# Patient Record
Sex: FEMALE | Race: ASIAN | NOT HISPANIC OR LATINO | URBAN - METROPOLITAN AREA
[De-identification: names, ages, dates, MRNs, and addresses within clinical notes are randomized per-mention and may not be internally consistent; named-entity substitution may affect disease eponyms.]

---

## 2021-03-23 ENCOUNTER — INPATIENT (INPATIENT)
Facility: HOSPITAL | Age: 51
LOS: 5 days | Discharge: ANOTHER HOSPITAL | End: 2021-03-29
Attending: INTERNAL MEDICINE | Admitting: INTERNAL MEDICINE
Payer: MEDICAID

## 2021-03-23 VITALS
OXYGEN SATURATION: 92 % | RESPIRATION RATE: 30 BRPM | SYSTOLIC BLOOD PRESSURE: 173 MMHG | HEART RATE: 132 BPM | TEMPERATURE: 98 F | DIASTOLIC BLOOD PRESSURE: 126 MMHG

## 2021-03-23 DIAGNOSIS — Z98.891 HISTORY OF UTERINE SCAR FROM PREVIOUS SURGERY: Chronic | ICD-10-CM

## 2021-03-23 DIAGNOSIS — I10 ESSENTIAL (PRIMARY) HYPERTENSION: ICD-10-CM

## 2021-03-23 DIAGNOSIS — I50.9 HEART FAILURE, UNSPECIFIED: ICD-10-CM

## 2021-03-23 DIAGNOSIS — E11.10 TYPE 2 DIABETES MELLITUS WITH KETOACIDOSIS WITHOUT COMA: ICD-10-CM

## 2021-03-23 DIAGNOSIS — I50.1 LEFT VENTRICULAR FAILURE, UNSPECIFIED: ICD-10-CM

## 2021-03-23 LAB
A1C WITH ESTIMATED AVERAGE GLUCOSE RESULT: 13.9 % — HIGH (ref 4–5.6)
ALBUMIN SERPL ELPH-MCNC: 4 G/DL — SIGNIFICANT CHANGE UP (ref 3.3–5)
ALP SERPL-CCNC: 104 U/L — SIGNIFICANT CHANGE UP (ref 40–120)
ALT FLD-CCNC: 25 U/L — SIGNIFICANT CHANGE UP (ref 4–33)
ANION GAP SERPL CALC-SCNC: 14 MMOL/L — SIGNIFICANT CHANGE UP (ref 7–14)
ANION GAP SERPL CALC-SCNC: 18 MMOL/L — HIGH (ref 7–14)
APPEARANCE UR: CLEAR — SIGNIFICANT CHANGE UP
APPEARANCE UR: CLEAR — SIGNIFICANT CHANGE UP
APTT BLD: 28.5 SEC — SIGNIFICANT CHANGE UP (ref 27–36.3)
AST SERPL-CCNC: 29 U/L — SIGNIFICANT CHANGE UP (ref 4–32)
B-OH-BUTYR SERPL-SCNC: 2 MMOL/L — HIGH (ref 0–0.4)
BASOPHILS # BLD AUTO: 0.03 K/UL — SIGNIFICANT CHANGE UP (ref 0–0.2)
BASOPHILS NFR BLD AUTO: 0.2 % — SIGNIFICANT CHANGE UP (ref 0–2)
BILIRUB SERPL-MCNC: 0.6 MG/DL — SIGNIFICANT CHANGE UP (ref 0.2–1.2)
BILIRUB UR-MCNC: NEGATIVE — SIGNIFICANT CHANGE UP
BILIRUB UR-MCNC: NEGATIVE — SIGNIFICANT CHANGE UP
BLOOD GAS VENOUS COMPREHENSIVE RESULT: SIGNIFICANT CHANGE UP
BLOOD GAS VENOUS COMPREHENSIVE RESULT: SIGNIFICANT CHANGE UP
BUN SERPL-MCNC: 17 MG/DL — SIGNIFICANT CHANGE UP (ref 7–23)
BUN SERPL-MCNC: 19 MG/DL — SIGNIFICANT CHANGE UP (ref 7–23)
CALCIUM SERPL-MCNC: 9.5 MG/DL — SIGNIFICANT CHANGE UP (ref 8.4–10.5)
CALCIUM SERPL-MCNC: 9.6 MG/DL — SIGNIFICANT CHANGE UP (ref 8.4–10.5)
CHLORIDE SERPL-SCNC: 92 MMOL/L — LOW (ref 98–107)
CHLORIDE SERPL-SCNC: 98 MMOL/L — SIGNIFICANT CHANGE UP (ref 98–107)
CO2 SERPL-SCNC: 17 MMOL/L — LOW (ref 22–31)
CO2 SERPL-SCNC: 23 MMOL/L — SIGNIFICANT CHANGE UP (ref 22–31)
COLOR SPEC: COLORLESS — SIGNIFICANT CHANGE UP
COLOR SPEC: SIGNIFICANT CHANGE UP
CREAT SERPL-MCNC: 0.52 MG/DL — SIGNIFICANT CHANGE UP (ref 0.5–1.3)
CREAT SERPL-MCNC: 0.56 MG/DL — SIGNIFICANT CHANGE UP (ref 0.5–1.3)
DIFF PNL FLD: NEGATIVE — SIGNIFICANT CHANGE UP
DIFF PNL FLD: NEGATIVE — SIGNIFICANT CHANGE UP
EOSINOPHIL # BLD AUTO: 0.01 K/UL — SIGNIFICANT CHANGE UP (ref 0–0.5)
EOSINOPHIL NFR BLD AUTO: 0.1 % — SIGNIFICANT CHANGE UP (ref 0–6)
ESTIMATED AVERAGE GLUCOSE: 352 MG/DL — HIGH (ref 68–114)
GLUCOSE BLDC GLUCOMTR-MCNC: 207 MG/DL — HIGH (ref 70–99)
GLUCOSE BLDC GLUCOMTR-MCNC: 229 MG/DL — HIGH (ref 70–99)
GLUCOSE BLDC GLUCOMTR-MCNC: 241 MG/DL — HIGH (ref 70–99)
GLUCOSE BLDC GLUCOMTR-MCNC: 278 MG/DL — HIGH (ref 70–99)
GLUCOSE BLDC GLUCOMTR-MCNC: 293 MG/DL — HIGH (ref 70–99)
GLUCOSE BLDC GLUCOMTR-MCNC: 307 MG/DL — HIGH (ref 70–99)
GLUCOSE BLDC GLUCOMTR-MCNC: 316 MG/DL — HIGH (ref 70–99)
GLUCOSE BLDC GLUCOMTR-MCNC: 392 MG/DL — HIGH (ref 70–99)
GLUCOSE BLDC GLUCOMTR-MCNC: 548 MG/DL — CRITICAL HIGH (ref 70–99)
GLUCOSE SERPL-MCNC: 312 MG/DL — HIGH (ref 70–99)
GLUCOSE SERPL-MCNC: 659 MG/DL — CRITICAL HIGH (ref 70–99)
GLUCOSE UR QL: ABNORMAL
GLUCOSE UR QL: ABNORMAL
HCT VFR BLD CALC: 43.6 % — SIGNIFICANT CHANGE UP (ref 34.5–45)
HGB BLD-MCNC: 13.9 G/DL — SIGNIFICANT CHANGE UP (ref 11.5–15.5)
IANC: 15.19 K/UL — HIGH (ref 1.5–8.5)
IMM GRANULOCYTES NFR BLD AUTO: 0.8 % — SIGNIFICANT CHANGE UP (ref 0–1.5)
INR BLD: 0.97 RATIO — SIGNIFICANT CHANGE UP (ref 0.88–1.16)
KETONES UR-MCNC: ABNORMAL
KETONES UR-MCNC: ABNORMAL
LEUKOCYTE ESTERASE UR-ACNC: NEGATIVE — SIGNIFICANT CHANGE UP
LEUKOCYTE ESTERASE UR-ACNC: NEGATIVE — SIGNIFICANT CHANGE UP
LYMPHOCYTES # BLD AUTO: 0.88 K/UL — LOW (ref 1–3.3)
LYMPHOCYTES # BLD AUTO: 5.3 % — LOW (ref 13–44)
MAGNESIUM SERPL-MCNC: 2 MG/DL — SIGNIFICANT CHANGE UP (ref 1.6–2.6)
MCHC RBC-ENTMCNC: 25.3 PG — LOW (ref 27–34)
MCHC RBC-ENTMCNC: 31.9 GM/DL — LOW (ref 32–36)
MCV RBC AUTO: 79.4 FL — LOW (ref 80–100)
MONOCYTES # BLD AUTO: 0.31 K/UL — SIGNIFICANT CHANGE UP (ref 0–0.9)
MONOCYTES NFR BLD AUTO: 1.9 % — LOW (ref 2–14)
NEUTROPHILS # BLD AUTO: 15.19 K/UL — HIGH (ref 1.8–7.4)
NEUTROPHILS NFR BLD AUTO: 91.7 % — HIGH (ref 43–77)
NITRITE UR-MCNC: NEGATIVE — SIGNIFICANT CHANGE UP
NITRITE UR-MCNC: NEGATIVE — SIGNIFICANT CHANGE UP
NRBC # BLD: 0 /100 WBCS — SIGNIFICANT CHANGE UP
NRBC # FLD: 0 K/UL — SIGNIFICANT CHANGE UP
NT-PROBNP SERPL-SCNC: 2107 PG/ML — HIGH
NT-PROBNP SERPL-SCNC: 2847 PG/ML — HIGH
PH UR: 5.5 — SIGNIFICANT CHANGE UP (ref 5–8)
PH UR: 6 — SIGNIFICANT CHANGE UP (ref 5–8)
PLATELET # BLD AUTO: 456 K/UL — HIGH (ref 150–400)
POTASSIUM SERPL-MCNC: 4.2 MMOL/L — SIGNIFICANT CHANGE UP (ref 3.5–5.3)
POTASSIUM SERPL-MCNC: 4.3 MMOL/L — SIGNIFICANT CHANGE UP (ref 3.5–5.3)
POTASSIUM SERPL-SCNC: 4.2 MMOL/L — SIGNIFICANT CHANGE UP (ref 3.5–5.3)
POTASSIUM SERPL-SCNC: 4.3 MMOL/L — SIGNIFICANT CHANGE UP (ref 3.5–5.3)
PROT SERPL-MCNC: 7.5 G/DL — SIGNIFICANT CHANGE UP (ref 6–8.3)
PROT UR-MCNC: ABNORMAL
PROT UR-MCNC: NEGATIVE — SIGNIFICANT CHANGE UP
PROTHROM AB SERPL-ACNC: 11.2 SEC — SIGNIFICANT CHANGE UP (ref 10.6–13.6)
RBC # BLD: 5.49 M/UL — HIGH (ref 3.8–5.2)
RBC # FLD: 13.4 % — SIGNIFICANT CHANGE UP (ref 10.3–14.5)
SARS-COV-2 RNA SPEC QL NAA+PROBE: SIGNIFICANT CHANGE UP
SODIUM SERPL-SCNC: 127 MMOL/L — LOW (ref 135–145)
SODIUM SERPL-SCNC: 135 MMOL/L — SIGNIFICANT CHANGE UP (ref 135–145)
SP GR SPEC: 1.01 — SIGNIFICANT CHANGE UP (ref 1.01–1.02)
SP GR SPEC: 1.03 — HIGH (ref 1.01–1.02)
TROPONIN T, HIGH SENSITIVITY RESULT: 111 NG/L — CRITICAL HIGH
TSH SERPL-MCNC: 1.77 UIU/ML — SIGNIFICANT CHANGE UP (ref 0.27–4.2)
UROBILINOGEN FLD QL: SIGNIFICANT CHANGE UP
UROBILINOGEN FLD QL: SIGNIFICANT CHANGE UP
WBC # BLD: 16.55 K/UL — HIGH (ref 3.8–10.5)
WBC # FLD AUTO: 16.55 K/UL — HIGH (ref 3.8–10.5)

## 2021-03-23 PROCEDURE — 71045 X-RAY EXAM CHEST 1 VIEW: CPT | Mod: 26

## 2021-03-23 PROCEDURE — 71045 X-RAY EXAM CHEST 1 VIEW: CPT | Mod: 26,77

## 2021-03-23 PROCEDURE — 93010 ELECTROCARDIOGRAM REPORT: CPT

## 2021-03-23 PROCEDURE — 99291 CRITICAL CARE FIRST HOUR: CPT | Mod: 25

## 2021-03-23 RX ORDER — NITROGLYCERIN 6.5 MG
50 CAPSULE, EXTENDED RELEASE ORAL
Qty: 50 | Refills: 0 | Status: DISCONTINUED | OUTPATIENT
Start: 2021-03-23 | End: 2021-03-23

## 2021-03-23 RX ORDER — MIDAZOLAM HYDROCHLORIDE 1 MG/ML
0.02 INJECTION, SOLUTION INTRAMUSCULAR; INTRAVENOUS
Qty: 100 | Refills: 0 | Status: DISCONTINUED | OUTPATIENT
Start: 2021-03-23 | End: 2021-03-23

## 2021-03-23 RX ORDER — FENTANYL CITRATE 50 UG/ML
0.5 INJECTION INTRAVENOUS
Qty: 5000 | Refills: 0 | Status: DISCONTINUED | OUTPATIENT
Start: 2021-03-23 | End: 2021-03-23

## 2021-03-23 RX ORDER — FUROSEMIDE 40 MG
40 TABLET ORAL ONCE
Refills: 0 | Status: COMPLETED | OUTPATIENT
Start: 2021-03-23 | End: 2021-03-23

## 2021-03-23 RX ORDER — DEXTROSE 50 % IN WATER 50 %
25 SYRINGE (ML) INTRAVENOUS
Refills: 0 | Status: DISCONTINUED | OUTPATIENT
Start: 2021-03-23 | End: 2021-03-23

## 2021-03-23 RX ORDER — NITROGLYCERIN 6.5 MG
15 CAPSULE, EXTENDED RELEASE ORAL
Qty: 50 | Refills: 0 | Status: DISCONTINUED | OUTPATIENT
Start: 2021-03-23 | End: 2021-03-23

## 2021-03-23 RX ORDER — SODIUM CHLORIDE 9 MG/ML
250 INJECTION INTRAMUSCULAR; INTRAVENOUS; SUBCUTANEOUS
Refills: 0 | Status: DISCONTINUED | OUTPATIENT
Start: 2021-03-23 | End: 2021-03-24

## 2021-03-23 RX ORDER — POTASSIUM CHLORIDE 20 MEQ
10 PACKET (EA) ORAL
Refills: 0 | Status: COMPLETED | OUTPATIENT
Start: 2021-03-23 | End: 2021-03-23

## 2021-03-23 RX ORDER — INSULIN HUMAN 100 [IU]/ML
6 INJECTION, SOLUTION SUBCUTANEOUS
Qty: 100 | Refills: 0 | Status: DISCONTINUED | OUTPATIENT
Start: 2021-03-23 | End: 2021-03-23

## 2021-03-23 RX ORDER — HEPARIN SODIUM 5000 [USP'U]/ML
5000 INJECTION INTRAVENOUS; SUBCUTANEOUS EVERY 12 HOURS
Refills: 0 | Status: DISCONTINUED | OUTPATIENT
Start: 2021-03-23 | End: 2021-03-24

## 2021-03-23 RX ORDER — INSULIN HUMAN 100 [IU]/ML
3 INJECTION, SOLUTION SUBCUTANEOUS
Qty: 100 | Refills: 0 | Status: DISCONTINUED | OUTPATIENT
Start: 2021-03-23 | End: 2021-03-24

## 2021-03-23 RX ORDER — SODIUM CHLORIDE 9 MG/ML
1000 INJECTION, SOLUTION INTRAVENOUS
Refills: 0 | Status: DISCONTINUED | OUTPATIENT
Start: 2021-03-23 | End: 2021-03-24

## 2021-03-23 RX ORDER — DEXTROSE 50 % IN WATER 50 %
50 SYRINGE (ML) INTRAVENOUS
Refills: 0 | Status: DISCONTINUED | OUTPATIENT
Start: 2021-03-23 | End: 2021-03-23

## 2021-03-23 RX ADMIN — Medication 15 MICROGRAM(S)/MIN: at 15:06

## 2021-03-23 RX ADMIN — SODIUM CHLORIDE 50 MILLILITER(S): 9 INJECTION INTRAMUSCULAR; INTRAVENOUS; SUBCUTANEOUS at 15:51

## 2021-03-23 RX ADMIN — INSULIN HUMAN 3 UNIT(S)/HR: 100 INJECTION, SOLUTION SUBCUTANEOUS at 18:05

## 2021-03-23 RX ADMIN — Medication 100 MILLIEQUIVALENT(S): at 15:07

## 2021-03-23 RX ADMIN — SODIUM CHLORIDE 50 MILLILITER(S): 9 INJECTION INTRAMUSCULAR; INTRAVENOUS; SUBCUTANEOUS at 20:18

## 2021-03-23 RX ADMIN — Medication 40 MILLIGRAM(S): at 14:28

## 2021-03-23 RX ADMIN — INSULIN HUMAN 6 UNIT(S)/HR: 100 INJECTION, SOLUTION SUBCUTANEOUS at 15:41

## 2021-03-23 RX ADMIN — Medication 100 MILLIEQUIVALENT(S): at 17:21

## 2021-03-23 RX ADMIN — Medication 100 MILLIEQUIVALENT(S): at 16:08

## 2021-03-23 RX ADMIN — INSULIN HUMAN 3 UNIT(S)/HR: 100 INJECTION, SOLUTION SUBCUTANEOUS at 20:14

## 2021-03-23 NOTE — ED PROVIDER NOTE - ATTENDING CONTRIBUTION TO CARE
agree with resident note    "51y/o F w/ h/o T2DM (on metformin) p/w chest pain for 1 wk a/w exertional sob. Tested negative for COVID 2d ago. No fevers, chills, n/v/d, abdominal pain, urinary symptoms."   Pt states had cough for a week and then progressively worsening SOB to today.  Denies fever, abdominal pain, leg swelling    PE: severe respiratory distress; tachypneic; hypertensive; rales on exam; s1 s2 no m/r/g abd soft/NT/ND exT: no edema    bedside US: B line diffusely; large pleural effusions; EF severely diminished    Imp: heart failure; BIPAP/lasix/nitro ggt; etiology possibly viral myocarditis; EKG NSST lateral changes no STEMI; fingerstick above 500 ; DKA?  will need insulin, CCU consult, trops, TTE  admit

## 2021-03-23 NOTE — H&P ADULT - PROBLEM SELECTOR PLAN 2
-Patient in DKA, anion gap 18 on admission and blood glucose 770- started on insulin drip after the bolus insulin  -Endo consulted  - c/w insulin drip till gap close -Patient in  mild DKA, anion gap 18 on admission and blood glucose 770- started on insulin drip   FSBG q1h while on insulin drip.  -Endo consulted appreciated  - c/w insulin drip till gap close and then transition to basal insulin 2 hrs prior to discontinuing the insulin drip and once off the drip, start moderate dose Admelog correctional scale q6h if NPO or ac/hs if on diet.

## 2021-03-23 NOTE — ED PROVIDER NOTE - OBJECTIVE STATEMENT
49y/o F w/ h/o T2DM (on metformin) p/w chest pain for 1 wk a/w exertional sob. Tested negative for COVID 2d ago. No fevers, chills, n/v/d, abdominal pain, urinary symptoms.

## 2021-03-23 NOTE — H&P ADULT - NSHPREVIEWOFSYSTEMS_GEN_ALL_CORE
REVIEW OF SYSTEMS    General: no fatigue/malaise, weight loss/gain.  Skin: no rashes.  Ophthalmologic: no blurred vision, no loss of vision. 	  ENT: no sore throat, rhinorrhea, sinus congestion.  Cardiovascular:+ chest pain ,no palpitation, no dizziness ,no diaphoresis, no edema  Respiratory: + SOB, +cough, no   wheeze.  Gastrointestinal:  no N/V/D, no melena/hematemesis/hematochezia.  Genitourinary: no dysuria/hesitancy or hematuria.  Musculoskeletal: no myalgias or arthralgias.  Neurological: no changes in vision or hearing, no lightheadedness/dizziness, no syncope/near syncope	  Psychiatric: no unusual stress/anxiety

## 2021-03-23 NOTE — ED ADULT TRIAGE NOTE - CHIEF COMPLAINT QUOTE
Subjective:     Encounter Date:03/07/2018      Patient ID: Leny Moya is a 70 y.o. female.    Chief Complaint:  Atrial Fibrillation   Presents for initial visit. Symptoms include shortness of breath. Symptoms are negative for bradycardia. The symptoms have been worsening. Past medical history includes atrial fibrillation.   Hypertension   Associated symptoms include shortness of breath.   Shortness of Breath   This is a chronic problem. The current episode started more than 1 month ago. The problem occurs daily. The problem has been rapidly worsening. Associated symptoms include a rash.   Fatigue   This is a recurrent problem. The current episode started more than 1 month ago. The problem has been rapidly worsening. Associated symptoms include fatigue and a rash. Nothing aggravates the symptoms.     70 year old female who presents today for reevaluation. She feels significantly worse than she did 2 weeks ago.  She says she feels shaky inside she is more short of breath more fatigued.  Her red spots however did improve.    Review of Systems   Constitution: Positive for fatigue.   Respiratory: Positive for shortness of breath.    Skin: Positive for rash.   All other systems reviewed and are negative.      Procedures         Objective:     Physical Exam   Constitutional: She is oriented to person, place, and time. She appears well-developed.   HENT:   Head: Normocephalic.   Eyes: Conjunctivae are normal.   Neck: Normal range of motion.   Cardiovascular: Normal rate and normal heart sounds.  An irregularly irregular rhythm present.   Pulmonary/Chest: Breath sounds normal.   Abdominal: Soft. Bowel sounds are normal.   Musculoskeletal: Normal range of motion. She exhibits no edema.   Neurological: She is alert and oriented to person, place, and time.   Skin: Skin is warm and dry.   Psychiatric: She has a normal mood and affect. Her behavior is normal.   Vitals reviewed.      Lab Review:       Assessment:           Diagnosis Plan   1. Chronic atrial fibrillation     2. Benign essential hypertension            Plan:       1.  Atrial fibrillation.  Chronic.  Patient is seen for reevaluation.  She actually says she feels significantly worse.  In light of that I placed her back on her digoxin 0.0125 daily and I decreased her Toprol down to 50 mg a day.  The red spots did go away but she said she brother have the red spots and feel the way she's feeling.  We will see if they will recur with digoxin and go from there..  2.  Hypertension blood pressures a little bit high.  At this point I'll continue to follow her clinically and tolerate a slightly elevated blood pressure.  3.  Patient to be reassessed in 6 weeks    Atrial Fibrillation and Atrial Flutter  Assessment  • The patient has permanent atrial fibrillation  • This is non-valvular in etiology  • The patient's CHADS2-VASc score is 4  • A [id number] score of 2 or more is considered a high risk for a thromboembolic event  • Apixaban prescribed    Plan  • Continue in atrial fibrillation with rate control  • Continue apixaban for antithrombotic therapy, bleeding issues discussed  • Continue beta blocker for rate control  • Add digoxin for rate control    Subjective - Objective  • The patient underwent cardioversion                 Pt hypertensive, tachypneic, hypoxic and tachycardic in triage. Pt states symptoms started 2 weeks ago worsening today. Pt in tripod position in triage. Covid test Sunday was negative. PMH DM HTN. Pt placed on 2L 02 via NC sating at 97% Pt hypertensive, tachypneic, hypoxic and tachycardic in triage. Pt states symptoms started 2 weeks ago worsening today. Pt in tripod position in triage. Covid test Sunday was negative. PMH DM HTN. Pt placed on 2L 02 via NC sating at 97%

## 2021-03-23 NOTE — H&P ADULT - ASSESSMENT
49y/o F w/ h/o T2DM  and Hypertension presents with progressive  worsening shortness of breath,orthopnea and chest pain for 1 wk in the seeting of not taking medication admitted for  fluid overload 2/2 acute heart failure and DKA 49y/o F w/ h/o T2DM  and Hypertension presents with progressive  worsening shortness of breath,orthopnea and chest pain for 1 wk in the seeting of not taking medication admitted for  fluid overload 2/2 acute heart failure and? mild DKA

## 2021-03-23 NOTE — H&P ADULT - NSHPPHYSICALEXAM_GEN_ALL_CORE
Appearance: Normal	  HEENT:   Normal oral mucosa, PERRL, EOMI  Cardiovascular: Normal S1 S2, + JVD, No murmurs, No edema  Respiratory:  b/l Lungs fine basilar crackles auscultation	  Psychiatry: A & O x 3, Mood & affect appropriate  Gastrointestinal:  Soft, Non-tender, + BS	  Skin: No rashes, No ecchymoses, No cyanosis	  Neurologic: Non-focal  Extremities: Normal range of motion, No clubbing, cyanosis or edema  Vascular: Peripheral pulses palpable 2+ bilaterally

## 2021-03-23 NOTE — ED PROVIDER NOTE - PHYSICAL EXAMINATION
GENERAL: non-toxic appearing in mild respiratory stress  HEAD: normocephalic, atraumatic  HEENT: normal conjunctiva, oral mucosa moist, uvula midline, no tonsilar exudates, neck supple, no JVD  CARDIAC: tachycardic rate and regular rhythm, normal S1S2, no appreciable murmurs, no peripheral edema, 2+ pulses in UE/LE b/l  PULM: crackles at bases b/l  GI: abdomen nondistended, soft, nontender, no guarding, no rebound tenderness  : no CVA tenderness b/l, no suprapubic tenderness  NEURO: no focal motor or sensory deficits, AAOx3  MSK: no calf tenderness b/l  SKIN: well-perfused, extremities warm, no visible rashes  PSYCH: appropriate mood and affect

## 2021-03-23 NOTE — H&P ADULT - HISTORY OF PRESENT ILLNESS
51y/o F w/ h/o T2DM (on metformin) p/w chest pain for 1 wk a/w exertional sob. Tested negative for COVID 2d ago. No fevers, chills, n/v/d, abdominal pain, urinary symptoms."   Pt states had cough for a week and then progressively worsening SOB to today.  Denies fever, abdominal pain, leg swelling    PE: severe respiratory distress; tachypneic; hypertensive; rales on exam; s1 s2 no m/r/g abd soft/NT/ND exT: no edema    bedside US: B line diffusely; large pleural effusions; EF severely diminished    Imp: heart failure; BIPAP/lasix/nitro ggt; etiology possibly viral myocarditis; EKG NSST lateral changes no STEMI; fingerstick above 500 ; DKA?  will need insulin, CCU consult, trops, TTE  admit. 49y/o F w/ h/o T2DM  and Hypertension p/w  worsening shortness of breath and chest pain for 1 wk . Reports diagnosed with  Hypertension and diabetes mellitus in 2005.She stopped her diabetic and hypertension medication completely 9 months ago due to loss of medical insurance for 1 year. She  reports mild sob started one month ago which got progressive worsen. She was very sob  with minimal exertion that she cannot carry out her daily routine  associated with  cough and orthopnea requiring 2-3 pillow to sleepx1.She denies nocturnal dyspnea. Today she was very sob at rest and presented to ED. She Tested negative for COVID 2d ago. No fevers, chills, n/v/d, abdominal pain, urinary symptoms and  leg swelling.  In ED Bp 173/126, , RR 30, sat 92%on RA.   Bedside US: B line diffusely; large pleural effusions; EF severely diminished . EKG: normal sinus tachy 120 with ST depression V5, V6  She was given lasix 40mg IVP x1, started on nitro drip and placed on Bipap   Lab: WBC 16.55, Na 127, lactate 3.8, proBNP 2107, trop 111-  viral infection vs new HF   Her labs also  suggest  DKA( blood glucose 600-700, Beta hydroxybutyrate 2, Anion gap 18)  She was started on insulin drip for DKA      Imp: heart failure; BIPAP/lasix/nitro ggt; etiology possibly viral myocarditis; EKG NSST lateral changes no STEMI; fingerstick above 500 ; DKA?  will need insulin, CCU consult, trops, TTE  admit. 49y/o F w/ h/o T2DM  and Hypertension p/w  worsening shortness of breath and chest pain for 1 wk . Reports diagnosed with  Hypertension and diabetes mellitus in 2005.She stopped her diabetic sand hypertension medication completely, 9 months ago due to loss of medical insurance for 1 year. She  reports mild sob started one month ago which got progressive worse. Last 1 week she was unable to perform her  daily routines due to  sob  with minimal exertion  associated with  cough and orthopnea requiring 2-3 pillow to sleep. Today She was very sob at rest and presented to ED. She was  tested negative for COVID 2d ago. No fevers, chills, n/v/d, abdominal pain, nocturnal dtspnesurinary symptoms and  leg swelling.  In ED /126, , RR 30, sat 92%on RA.   Bedside US: B line diffusely; large pleural effusions; EF severely diminished . EKG: normal sinus tachy 120 with ST depression V5, V6  She was given lasix 40mg IVP x1, started on nitro drip and placed on Bipap   Lab: WBC 16.55, Na 127, lactate 3.8, proBNP 2107, trop 111-  viral infection vs new HF   Her labs also  suggest  ?mild DKA( blood glucose 600-700, Beta hydroxybutyrate 2, Anion gap 18, trace ketonuria)-She was started on insulin drip for DKA  Currently she is off bipap and diuresing well on lasix. Admit to CCU for further managemnt

## 2021-03-23 NOTE — ED ADULT NURSE NOTE - OBJECTIVE STATEMENT
49 y/o female arrives to E.D. rm 13 presenting with SOB and endorsing chest pain x 1 wk. Pt's breathing is labored, pt unable to complete all assessment questions at this time. A&Ox4, bipap applied by RT, pt endorses chest pain, denies n/v/d, denies fevers/cough/body aches. U/S IV placed to R arm by provider. L 20g IV placed to forearm by RN. ST on tele. Pt placed on nitro gtt, mobile icu RN at bedside. EKG completed. Will continue to monitor.

## 2021-03-23 NOTE — ED ADULT NURSE NOTE - CHIEF COMPLAINT QUOTE
Pt hypertensive, tachypneic, hypoxic and tachycardic in triage. Pt states symptoms started 2 weeks ago worsening today. Pt in tripod position in triage. Covid test Sunday was negative. PMH DM HTN. Pt placed on 2L 02 via NC sating at 97%

## 2021-03-23 NOTE — ED PROVIDER NOTE - CLINICAL SUMMARY MEDICAL DECISION MAKING FREE TEXT BOX
49y/o F w/ h/o T2DM (on metformin) p/w chest pain for 1 wk a/w exertional sob with negative COVID test, now tachycardic, tachypneic, hypoxic likely 2/2 new onset CHF. Other ddx flash pulmonary edema, diabetic emergency such as DKA. Will check labs, troponin, UA, put on BIPAP, lasix and nitro and reassess. same name as above

## 2021-03-23 NOTE — CHART NOTE - NSCHARTNOTEFT_GEN_A_CORE
Endocrine consult requested for DKA.    Chart reviewed and spoke to primary team.    51 y/o F w/ h/o T2DM (on metformin) p/w chest pain for 1 wk a/w exertional sob. Tested negative for COVID 2d ago. Being admitted for acute heart failure.   Also noted to have DKA: elevated BG to 600s with AG to 19, bicarb 17, pH 7.8 and BHB 2.  Currently on insulin drip, most recent BG down trending to 200s.     Plan for now given concern for DKA:   Continue insulin drip as per DKA protocol, add dextrose as per protocol as FSBG starts to decrease <250, replete K as needed.  FSBG q1h while on insulin drip.  Trend BMP q4hrs until DKA resolves.     Criteria for resolution of DKA/transition to subq insulin:   BG glucose <200 plus two of the following   AG <12  bicarb >18  Arterial or VBG PH >7.3    Once DKA is resolved, initiate Lantus subq (tentatively 15 units) 2 hrs prior to discontinuing the insulin drip and once off the drip, start moderate dose Admelog correctional scale q6h if NPO or ac/hs if on diet.     Formal consult to follow in AM.    Can call endocrine if any questions or concerns.     Joana Marie MD  Endocrine Fellow  Pager [phone] from 9 am to 5 pm Mon to Fri.  After hours and on weekends please call [phone]

## 2021-03-24 DIAGNOSIS — E11.65 TYPE 2 DIABETES MELLITUS WITH HYPERGLYCEMIA: ICD-10-CM

## 2021-03-24 DIAGNOSIS — E78.5 HYPERLIPIDEMIA, UNSPECIFIED: ICD-10-CM

## 2021-03-24 LAB
A1C WITH ESTIMATED AVERAGE GLUCOSE RESULT: 13.9 % — HIGH (ref 4–5.6)
ANION GAP SERPL CALC-SCNC: 10 MMOL/L — SIGNIFICANT CHANGE UP (ref 7–14)
ANION GAP SERPL CALC-SCNC: 12 MMOL/L — SIGNIFICANT CHANGE UP (ref 7–14)
ANION GAP SERPL CALC-SCNC: 13 MMOL/L — SIGNIFICANT CHANGE UP (ref 7–14)
ANION GAP SERPL CALC-SCNC: 15 MMOL/L — HIGH (ref 7–14)
APTT BLD: 53.4 SEC — HIGH (ref 27–36.3)
BASE EXCESS BLDV CALC-SCNC: -0.6 MMOL/L — SIGNIFICANT CHANGE UP (ref -3–2)
BLOOD GAS VENOUS - CREATININE: 0.5 MG/DL — SIGNIFICANT CHANGE UP (ref 0.5–1.3)
BLOOD GAS VENOUS - CREATININE: 0.6 MG/DL — SIGNIFICANT CHANGE UP (ref 0.5–1.3)
BLOOD GAS VENOUS COMPREHENSIVE RESULT: SIGNIFICANT CHANGE UP
BLOOD GAS VENOUS COMPREHENSIVE RESULT: SIGNIFICANT CHANGE UP
BUN SERPL-MCNC: 19 MG/DL — SIGNIFICANT CHANGE UP (ref 7–23)
BUN SERPL-MCNC: 19 MG/DL — SIGNIFICANT CHANGE UP (ref 7–23)
BUN SERPL-MCNC: 20 MG/DL — SIGNIFICANT CHANGE UP (ref 7–23)
BUN SERPL-MCNC: 20 MG/DL — SIGNIFICANT CHANGE UP (ref 7–23)
CALCIUM SERPL-MCNC: 8.1 MG/DL — LOW (ref 8.4–10.5)
CALCIUM SERPL-MCNC: 9.2 MG/DL — SIGNIFICANT CHANGE UP (ref 8.4–10.5)
CALCIUM SERPL-MCNC: 9.5 MG/DL — SIGNIFICANT CHANGE UP (ref 8.4–10.5)
CALCIUM SERPL-MCNC: 9.9 MG/DL — SIGNIFICANT CHANGE UP (ref 8.4–10.5)
CHLORIDE BLDV-SCNC: 105 MMOL/L — SIGNIFICANT CHANGE UP (ref 96–108)
CHLORIDE BLDV-SCNC: 109 MMOL/L — HIGH (ref 96–108)
CHLORIDE SERPL-SCNC: 100 MMOL/L — SIGNIFICANT CHANGE UP (ref 98–107)
CHLORIDE SERPL-SCNC: 98 MMOL/L — SIGNIFICANT CHANGE UP (ref 98–107)
CHLORIDE SERPL-SCNC: 99 MMOL/L — SIGNIFICANT CHANGE UP (ref 98–107)
CHLORIDE SERPL-SCNC: 99 MMOL/L — SIGNIFICANT CHANGE UP (ref 98–107)
CHOLEST SERPL-MCNC: 198 MG/DL — SIGNIFICANT CHANGE UP
CO2 SERPL-SCNC: 22 MMOL/L — SIGNIFICANT CHANGE UP (ref 22–31)
CO2 SERPL-SCNC: 22 MMOL/L — SIGNIFICANT CHANGE UP (ref 22–31)
CO2 SERPL-SCNC: 23 MMOL/L — SIGNIFICANT CHANGE UP (ref 22–31)
CO2 SERPL-SCNC: 24 MMOL/L — SIGNIFICANT CHANGE UP (ref 22–31)
CREAT SERPL-MCNC: 0.48 MG/DL — LOW (ref 0.5–1.3)
CREAT SERPL-MCNC: 0.48 MG/DL — LOW (ref 0.5–1.3)
CREAT SERPL-MCNC: 0.52 MG/DL — SIGNIFICANT CHANGE UP (ref 0.5–1.3)
CREAT SERPL-MCNC: 0.55 MG/DL — SIGNIFICANT CHANGE UP (ref 0.5–1.3)
ESTIMATED AVERAGE GLUCOSE: 352 MG/DL — HIGH (ref 68–114)
GAS PNL BLDV: 132 MMOL/L — LOW (ref 136–146)
GAS PNL BLDV: 134 MMOL/L — LOW (ref 136–146)
GLUCOSE BLDC GLUCOMTR-MCNC: 110 MG/DL — HIGH (ref 70–99)
GLUCOSE BLDC GLUCOMTR-MCNC: 149 MG/DL — HIGH (ref 70–99)
GLUCOSE BLDC GLUCOMTR-MCNC: 154 MG/DL — HIGH (ref 70–99)
GLUCOSE BLDC GLUCOMTR-MCNC: 171 MG/DL — HIGH (ref 70–99)
GLUCOSE BLDC GLUCOMTR-MCNC: 194 MG/DL — HIGH (ref 70–99)
GLUCOSE BLDC GLUCOMTR-MCNC: 198 MG/DL — HIGH (ref 70–99)
GLUCOSE BLDC GLUCOMTR-MCNC: 201 MG/DL — HIGH (ref 70–99)
GLUCOSE BLDC GLUCOMTR-MCNC: 259 MG/DL — HIGH (ref 70–99)
GLUCOSE BLDV-MCNC: 164 MG/DL — HIGH (ref 70–99)
GLUCOSE BLDV-MCNC: 265 MG/DL — HIGH (ref 70–99)
GLUCOSE SERPL-MCNC: 117 MG/DL — HIGH (ref 70–99)
GLUCOSE SERPL-MCNC: 160 MG/DL — HIGH (ref 70–99)
GLUCOSE SERPL-MCNC: 231 MG/DL — HIGH (ref 70–99)
GLUCOSE SERPL-MCNC: 270 MG/DL — HIGH (ref 70–99)
HCG UR QL: NEGATIVE — SIGNIFICANT CHANGE UP
HCO3 BLDV-SCNC: 23 MMOL/L — SIGNIFICANT CHANGE UP (ref 20–27)
HCO3 BLDV-SCNC: 24 MMOL/L — SIGNIFICANT CHANGE UP (ref 20–27)
HCT VFR BLD CALC: 42 % — SIGNIFICANT CHANGE UP (ref 34.5–45)
HCT VFR BLDA CALC: 37.9 % — SIGNIFICANT CHANGE UP (ref 34.5–46.5)
HCT VFR BLDA CALC: 41.9 % — SIGNIFICANT CHANGE UP (ref 34.5–46.5)
HDLC SERPL-MCNC: 63 MG/DL — SIGNIFICANT CHANGE UP
HGB BLD CALC-MCNC: 12.3 G/DL — SIGNIFICANT CHANGE UP (ref 11.5–15.5)
HGB BLD CALC-MCNC: 13.6 G/DL — SIGNIFICANT CHANGE UP (ref 11.5–15.5)
HGB BLD-MCNC: 13.2 G/DL — SIGNIFICANT CHANGE UP (ref 11.5–15.5)
LACTATE BLDV-MCNC: 1.4 MMOL/L — SIGNIFICANT CHANGE UP (ref 0.5–2)
LACTATE BLDV-MCNC: 1.4 MMOL/L — SIGNIFICANT CHANGE UP (ref 0.5–2)
LIPID PNL WITH DIRECT LDL SERPL: 117 MG/DL — HIGH
MAGNESIUM SERPL-MCNC: 1.6 MG/DL — SIGNIFICANT CHANGE UP (ref 1.6–2.6)
MAGNESIUM SERPL-MCNC: 1.7 MG/DL — SIGNIFICANT CHANGE UP (ref 1.6–2.6)
MAGNESIUM SERPL-MCNC: 1.9 MG/DL — SIGNIFICANT CHANGE UP (ref 1.6–2.6)
MAGNESIUM SERPL-MCNC: 1.9 MG/DL — SIGNIFICANT CHANGE UP (ref 1.6–2.6)
MCHC RBC-ENTMCNC: 25.1 PG — LOW (ref 27–34)
MCHC RBC-ENTMCNC: 31.4 GM/DL — LOW (ref 32–36)
MCV RBC AUTO: 80 FL — SIGNIFICANT CHANGE UP (ref 80–100)
NON HDL CHOLESTEROL: 135 MG/DL — HIGH
NRBC # BLD: 0 /100 WBCS — SIGNIFICANT CHANGE UP
NRBC # FLD: 0 K/UL — SIGNIFICANT CHANGE UP
PCO2 BLDV: 39 MMHG — LOW (ref 41–51)
PCO2 BLDV: 40 MMHG — LOW (ref 41–51)
PH BLDV: 7.39 — SIGNIFICANT CHANGE UP (ref 7.32–7.43)
PH BLDV: 7.41 — SIGNIFICANT CHANGE UP (ref 7.32–7.43)
PHOSPHATE SERPL-MCNC: 2.7 MG/DL — SIGNIFICANT CHANGE UP (ref 2.5–4.5)
PHOSPHATE SERPL-MCNC: 3.1 MG/DL — SIGNIFICANT CHANGE UP (ref 2.5–4.5)
PHOSPHATE SERPL-MCNC: 3.9 MG/DL — SIGNIFICANT CHANGE UP (ref 2.5–4.5)
PHOSPHATE SERPL-MCNC: 4.5 MG/DL — SIGNIFICANT CHANGE UP (ref 2.5–4.5)
PLATELET # BLD AUTO: 311 K/UL — SIGNIFICANT CHANGE UP (ref 150–400)
PO2 BLDV: 31 MMHG — LOW (ref 35–40)
PO2 BLDV: 48 MMHG — HIGH (ref 35–40)
POTASSIUM BLDV-SCNC: 3.7 MMOL/L — SIGNIFICANT CHANGE UP (ref 3.4–4.5)
POTASSIUM BLDV-SCNC: 4 MMOL/L — SIGNIFICANT CHANGE UP (ref 3.4–4.5)
POTASSIUM SERPL-MCNC: 3 MMOL/L — LOW (ref 3.5–5.3)
POTASSIUM SERPL-MCNC: 3.7 MMOL/L — SIGNIFICANT CHANGE UP (ref 3.5–5.3)
POTASSIUM SERPL-MCNC: 3.9 MMOL/L — SIGNIFICANT CHANGE UP (ref 3.5–5.3)
POTASSIUM SERPL-MCNC: 4.2 MMOL/L — SIGNIFICANT CHANGE UP (ref 3.5–5.3)
POTASSIUM SERPL-SCNC: 3 MMOL/L — LOW (ref 3.5–5.3)
POTASSIUM SERPL-SCNC: 3.7 MMOL/L — SIGNIFICANT CHANGE UP (ref 3.5–5.3)
POTASSIUM SERPL-SCNC: 3.9 MMOL/L — SIGNIFICANT CHANGE UP (ref 3.5–5.3)
POTASSIUM SERPL-SCNC: 4.2 MMOL/L — SIGNIFICANT CHANGE UP (ref 3.5–5.3)
RBC # BLD: 5.25 M/UL — HIGH (ref 3.8–5.2)
RBC # FLD: 13.5 % — SIGNIFICANT CHANGE UP (ref 10.3–14.5)
SAO2 % BLDV: 52.9 % — LOW (ref 60–85)
SAO2 % BLDV: 82.6 % — SIGNIFICANT CHANGE UP (ref 60–85)
SODIUM SERPL-SCNC: 132 MMOL/L — LOW (ref 135–145)
SODIUM SERPL-SCNC: 133 MMOL/L — LOW (ref 135–145)
SODIUM SERPL-SCNC: 136 MMOL/L — SIGNIFICANT CHANGE UP (ref 135–145)
SODIUM SERPL-SCNC: 136 MMOL/L — SIGNIFICANT CHANGE UP (ref 135–145)
TRIGL SERPL-MCNC: 89 MG/DL — SIGNIFICANT CHANGE UP
WBC # BLD: 13.76 K/UL — HIGH (ref 3.8–10.5)
WBC # FLD AUTO: 13.76 K/UL — HIGH (ref 3.8–10.5)

## 2021-03-24 PROCEDURE — 99223 1ST HOSP IP/OBS HIGH 75: CPT

## 2021-03-24 PROCEDURE — 93306 TTE W/DOPPLER COMPLETE: CPT | Mod: 26

## 2021-03-24 RX ORDER — METOPROLOL TARTRATE 50 MG
12.5 TABLET ORAL ONCE
Refills: 0 | Status: COMPLETED | OUTPATIENT
Start: 2021-03-24 | End: 2021-03-24

## 2021-03-24 RX ORDER — GLUCAGON INJECTION, SOLUTION 0.5 MG/.1ML
1 INJECTION, SOLUTION SUBCUTANEOUS ONCE
Refills: 0 | Status: DISCONTINUED | OUTPATIENT
Start: 2021-03-24 | End: 2021-03-29

## 2021-03-24 RX ORDER — POTASSIUM CHLORIDE 20 MEQ
40 PACKET (EA) ORAL EVERY 4 HOURS
Refills: 0 | Status: COMPLETED | OUTPATIENT
Start: 2021-03-24 | End: 2021-03-24

## 2021-03-24 RX ORDER — INSULIN LISPRO 100/ML
VIAL (ML) SUBCUTANEOUS AT BEDTIME
Refills: 0 | Status: DISCONTINUED | OUTPATIENT
Start: 2021-03-24 | End: 2021-03-29

## 2021-03-24 RX ORDER — INSULIN LISPRO 100/ML
VIAL (ML) SUBCUTANEOUS
Refills: 0 | Status: DISCONTINUED | OUTPATIENT
Start: 2021-03-24 | End: 2021-03-24

## 2021-03-24 RX ORDER — DEXTROSE 50 % IN WATER 50 %
25 SYRINGE (ML) INTRAVENOUS ONCE
Refills: 0 | Status: DISCONTINUED | OUTPATIENT
Start: 2021-03-24 | End: 2021-03-29

## 2021-03-24 RX ORDER — INSULIN LISPRO 100/ML
5 VIAL (ML) SUBCUTANEOUS
Refills: 0 | Status: DISCONTINUED | OUTPATIENT
Start: 2021-03-24 | End: 2021-03-25

## 2021-03-24 RX ORDER — DEXTROSE 50 % IN WATER 50 %
12.5 SYRINGE (ML) INTRAVENOUS ONCE
Refills: 0 | Status: DISCONTINUED | OUTPATIENT
Start: 2021-03-24 | End: 2021-03-29

## 2021-03-24 RX ORDER — LOSARTAN POTASSIUM 100 MG/1
25 TABLET, FILM COATED ORAL DAILY
Refills: 0 | Status: DISCONTINUED | OUTPATIENT
Start: 2021-03-24 | End: 2021-03-29

## 2021-03-24 RX ORDER — INSULIN GLARGINE 100 [IU]/ML
15 INJECTION, SOLUTION SUBCUTANEOUS AT BEDTIME
Refills: 0 | Status: DISCONTINUED | OUTPATIENT
Start: 2021-03-24 | End: 2021-03-25

## 2021-03-24 RX ORDER — ATORVASTATIN CALCIUM 80 MG/1
20 TABLET, FILM COATED ORAL AT BEDTIME
Refills: 0 | Status: DISCONTINUED | OUTPATIENT
Start: 2021-03-24 | End: 2021-03-29

## 2021-03-24 RX ORDER — POTASSIUM CHLORIDE 20 MEQ
10 PACKET (EA) ORAL
Refills: 0 | Status: COMPLETED | OUTPATIENT
Start: 2021-03-24 | End: 2021-03-24

## 2021-03-24 RX ORDER — SODIUM CHLORIDE 9 MG/ML
1000 INJECTION, SOLUTION INTRAVENOUS
Refills: 0 | Status: DISCONTINUED | OUTPATIENT
Start: 2021-03-24 | End: 2021-03-24

## 2021-03-24 RX ORDER — INSULIN LISPRO 100/ML
VIAL (ML) SUBCUTANEOUS
Refills: 0 | Status: DISCONTINUED | OUTPATIENT
Start: 2021-03-24 | End: 2021-03-29

## 2021-03-24 RX ORDER — HEPARIN SODIUM 5000 [USP'U]/ML
1100 INJECTION INTRAVENOUS; SUBCUTANEOUS
Qty: 25000 | Refills: 0 | Status: DISCONTINUED | OUTPATIENT
Start: 2021-03-24 | End: 2021-03-25

## 2021-03-24 RX ORDER — ASPIRIN/CALCIUM CARB/MAGNESIUM 324 MG
81 TABLET ORAL DAILY
Refills: 0 | Status: DISCONTINUED | OUTPATIENT
Start: 2021-03-24 | End: 2021-03-29

## 2021-03-24 RX ORDER — ASPIRIN/CALCIUM CARB/MAGNESIUM 324 MG
81 TABLET ORAL DAILY
Refills: 0 | Status: DISCONTINUED | OUTPATIENT
Start: 2021-03-24 | End: 2021-03-24

## 2021-03-24 RX ORDER — METOPROLOL TARTRATE 50 MG
12.5 TABLET ORAL
Refills: 0 | Status: DISCONTINUED | OUTPATIENT
Start: 2021-03-24 | End: 2021-03-27

## 2021-03-24 RX ORDER — SODIUM CHLORIDE 9 MG/ML
1000 INJECTION, SOLUTION INTRAVENOUS
Refills: 0 | Status: DISCONTINUED | OUTPATIENT
Start: 2021-03-24 | End: 2021-03-29

## 2021-03-24 RX ORDER — HEPARIN SODIUM 5000 [USP'U]/ML
1000 INJECTION INTRAVENOUS; SUBCUTANEOUS
Qty: 25000 | Refills: 0 | Status: DISCONTINUED | OUTPATIENT
Start: 2021-03-24 | End: 2021-03-24

## 2021-03-24 RX ORDER — DEXTROSE 50 % IN WATER 50 %
15 SYRINGE (ML) INTRAVENOUS ONCE
Refills: 0 | Status: DISCONTINUED | OUTPATIENT
Start: 2021-03-24 | End: 2021-03-29

## 2021-03-24 RX ORDER — CHLORHEXIDINE GLUCONATE 213 G/1000ML
1 SOLUTION TOPICAL DAILY
Refills: 0 | Status: DISCONTINUED | OUTPATIENT
Start: 2021-03-24 | End: 2021-03-29

## 2021-03-24 RX ORDER — INSULIN LISPRO 100/ML
VIAL (ML) SUBCUTANEOUS AT BEDTIME
Refills: 0 | Status: DISCONTINUED | OUTPATIENT
Start: 2021-03-24 | End: 2021-03-24

## 2021-03-24 RX ORDER — MAGNESIUM SULFATE 500 MG/ML
2 VIAL (ML) INJECTION ONCE
Refills: 0 | Status: COMPLETED | OUTPATIENT
Start: 2021-03-24 | End: 2021-03-24

## 2021-03-24 RX ORDER — FUROSEMIDE 40 MG
40 TABLET ORAL
Refills: 0 | Status: DISCONTINUED | OUTPATIENT
Start: 2021-03-24 | End: 2021-03-25

## 2021-03-24 RX ADMIN — Medication 5 UNIT(S): at 17:09

## 2021-03-24 RX ADMIN — Medication 100 MILLIEQUIVALENT(S): at 20:11

## 2021-03-24 RX ADMIN — Medication 100 MILLIEQUIVALENT(S): at 18:07

## 2021-03-24 RX ADMIN — INSULIN GLARGINE 15 UNIT(S): 100 INJECTION, SOLUTION SUBCUTANEOUS at 23:16

## 2021-03-24 RX ADMIN — HEPARIN SODIUM 11 UNIT(S)/HR: 5000 INJECTION INTRAVENOUS; SUBCUTANEOUS at 19:10

## 2021-03-24 RX ADMIN — Medication 12.5 MILLIGRAM(S): at 14:07

## 2021-03-24 RX ADMIN — SODIUM CHLORIDE 50 MILLILITER(S): 9 INJECTION, SOLUTION INTRAVENOUS at 03:39

## 2021-03-24 RX ADMIN — SODIUM CHLORIDE 50 MILLILITER(S): 9 INJECTION, SOLUTION INTRAVENOUS at 11:06

## 2021-03-24 RX ADMIN — SODIUM CHLORIDE 50 MILLILITER(S): 9 INJECTION, SOLUTION INTRAVENOUS at 08:37

## 2021-03-24 RX ADMIN — Medication 40 MILLIGRAM(S): at 17:10

## 2021-03-24 RX ADMIN — Medication 40 MILLIGRAM(S): at 05:07

## 2021-03-24 RX ADMIN — Medication 50 GRAM(S): at 18:06

## 2021-03-24 RX ADMIN — ATORVASTATIN CALCIUM 20 MILLIGRAM(S): 80 TABLET, FILM COATED ORAL at 23:15

## 2021-03-24 RX ADMIN — SODIUM CHLORIDE 50 MILLILITER(S): 9 INJECTION, SOLUTION INTRAVENOUS at 05:08

## 2021-03-24 RX ADMIN — HEPARIN SODIUM 10 UNIT(S)/HR: 5000 INJECTION INTRAVENOUS; SUBCUTANEOUS at 11:07

## 2021-03-24 RX ADMIN — Medication 12.5 MILLIGRAM(S): at 23:15

## 2021-03-24 RX ADMIN — HEPARIN SODIUM 5000 UNIT(S): 5000 INJECTION INTRAVENOUS; SUBCUTANEOUS at 01:22

## 2021-03-24 RX ADMIN — Medication 40 MILLIEQUIVALENT(S): at 18:07

## 2021-03-24 RX ADMIN — Medication 40 MILLIEQUIVALENT(S): at 23:16

## 2021-03-24 RX ADMIN — LOSARTAN POTASSIUM 25 MILLIGRAM(S): 100 TABLET, FILM COATED ORAL at 13:32

## 2021-03-24 RX ADMIN — Medication 0: at 23:16

## 2021-03-24 RX ADMIN — Medication 81 MILLIGRAM(S): at 13:32

## 2021-03-24 RX ADMIN — Medication 6: at 11:57

## 2021-03-24 RX ADMIN — INSULIN GLARGINE 15 UNIT(S): 100 INJECTION, SOLUTION SUBCUTANEOUS at 01:22

## 2021-03-24 RX ADMIN — CHLORHEXIDINE GLUCONATE 1 APPLICATION(S): 213 SOLUTION TOPICAL at 10:00

## 2021-03-24 RX ADMIN — Medication 100 MILLIEQUIVALENT(S): at 21:18

## 2021-03-24 NOTE — CONSULT NOTE ADULT - PROBLEM SELECTOR RECOMMENDATION 3
- c/w Lipitor 20 mg daily    Che Styles MD   Pager # [phone]  On evenings and weekends, please call the office at [phone] or page endocrine fellow on call. Please note that this patient may be followed by different provider tomorrow. If no answer, contact the office.

## 2021-03-24 NOTE — PROGRESS NOTE ADULT - ASSESSMENT
49y/o F w/ h/o T2DM  and Hypertension presents with progressive  worsening shortness of breath, orthopnea and chest pain for 1 wk in the setting of not taking medication admitted for  fluid overload 2/2 acute heart failure and? mild DKA    Neuro:  - A&O x3  - no active issues    Resp:  Acute respiratory failulre  - Presented with b/l lung crackles  - Was on BiPAP initially and improved with diuresis. No longer on biPAP    Cardio:  Acute heart failure  - Presented with SOB, MICHAELS, orthopnea  - proBNP 2170  - s/p lasix gtt 5 mg/hr  - Will continue diuresis with lasix 40 mg IVP BID  - Will continue daily weight, strict I/O's, DASH diet  - Echo ordered    HTN  - Will monitor BP    Renal:  - Will monitor Cr on BMP    GI:  - Will continue DASH diet    Endo:  T2DM  - Takes metformin at home  - Presented with glucose 659, AG 18  - Endo consulted. Recommended to initiate with insulin gtt, replete K as needed, BMP q4  - Ovn, gap closed to 10 but coming up to 15, glucose < 200, bicarb > 18, VBG pH > 7.39  - Started on lantus 15 u 2 hrs prior to dc insulin gtt and moderate admelog correctional ac/hs on diet  - Will f/u endo recs    Prophylaxis:  - DVT ppx: heparin 5000 u subq q12  - Will f/u pregnancy profile     51y/o F w/ h/o T2DM  and Hypertension presents with progressive  worsening shortness of breath, orthopnea and chest pain for 1 wk in the setting of not taking medication admitted for  fluid overload 2/2 acute heart failure and? mild DKA    Neuro:  - A&O x3  - no active issues    Resp:  Acute respiratory failulre  - Presented with b/l lung crackles  - Was on BiPAP initially and improved with diuresis. No longer on biPAP    Cardio:  Acute heart failure  - Presented with SOB, MICHAELS, orthopnea  - proBNP 2170  - s/p lasix gtt 5 mg/hr  - Will continue diuresis with lasix 40 mg IVP BID  - Will continue daily weight, strict I/O's, DASH diet  - Echo ordered    HTN  - Will monitor BP    Renal:  - Will monitor Cr on BMP    GI:  - Will continue DASH diet    Endo:  T2DM  - Takes metformin at home  - Presented with glucose 659, AG 18  - Endo consulted. Recommended to initiate with insulin gtt, replete K as needed, BMP q4  - Ovn, gap closed to 10 but coming up to 15, glucose < 200, bicarb > 18, VBG pH > 7.39  - Started on lantus 15 u 2 hrs prior to dc insulin gtt and moderate admelog correctional ac/hs on diet  - Will f/u endo recs    ID:  Leukocytosis likely reactive  - Presented with leukocytosis of 16  - Likely reactive in the setting of DKA and HF  - Will continue to monitor qd CBC  - Will monitor off abx    Prophylaxis:  - DVT ppx: heparin 5000 u subq q12  - Will f/u pregnancy profile     51y/o F w/ h/o T2DM  and Hypertension presents with progressive  worsening shortness of breath, orthopnea and chest pain for 1 wk in the setting of not taking medication admitted for  fluid overload 2/2 acute heart failure and? mild DKA    Cardiac studies:  - TTE 3/24: EF 35-40%, normal mitral valve, minimal MR, normal trileaflet aortic valve, moderate to severe LV systolic dysfunction, apical thrombus seen, normal right ventricular size and function, normal pericardium and trace pericardial effusion, b/l pleural effusions    Neuro:  - A&O x3  - no active issues    Resp:  Acute respiratory failure  - Resolved  - Presented with b/l lung crackles  - Was on BiPAP initially and improved with diuresis. No longer on biPAP  - Will wean off O2 as needed    Cardio:  Acute heart failure  - Presented with SOB, MICHAELS, orthopnea  - proBNP 2170  - s/p lasix gtt 5 mg/hr  - Will continue diuresis with lasix 40 mg IVP BID  - Will continue daily weight, strict I/O's, DASH diet  - TTE 3/24: EF 35-40%, normal mitral valve, minimal MR, normal trileaflet aortic valve, moderate to severe LV systolic dysfunction, apical thrombus seen, normal right ventricular size and function, normal pericardium and trace pericardial effusion, b/l pleural effusions  - Will start on metoprolol tartrate 12.5 mg bid, losartan 25 mg qd, aspirin and statin  - Will plan for LHC and RHC possibly on Fri    HTN  - Will start on metoprolol tartrate 12.5 mg bid, losartan 25 mg qd, aspirin and statin  - Will monitor BP  - Will check lipid panel    LV thrombus  - TTE 3/24 noted apical thrombus  - Will continue heparin gtt since possible cath    Renal:  - Will monitor Cr on BMP    GI:  - Will continue DASH, vegetarian diet    Endo:  T2DM  - Takes metformin at home  - Presented with glucose 659, AG 18  - Endo consulted. Recommended to initiate with insulin gtt, replete K as needed, BMP q4  - Ovn, gap closed to 10 but coming up to 15, glucose < 200, bicarb > 18, VBG pH > 7.39  - Started on lantus 15 u 2 hrs prior to dc insulin gtt and moderate admelog correctional ac/hs on diet  - Will f/u endo recs    ID:  Leukocytosis likely reactive  - Presented with leukocytosis of 16  - Likely reactive in the setting of DKA and HF  - Will continue to monitor qd CBC  - Will monitor off abx    Prophylaxis:  - DVT ppx: heparin 5000 u subq q12  - Pregnancy profile negative  - Will put in social work, nutrition, and diabetes education consult

## 2021-03-24 NOTE — CONSULT NOTE ADULT - SUBJECTIVE AND OBJECTIVE BOX
HPI:  Patient is a 50 year old woman with PMH HTN, HLD, uncontrolled DM2, p/w worsening shortness of breath and chest pain for 1 week, admitted for management of acute CHF as well as hyperglycemia the 600's. Consult called for management of uncontrolled DM2. A1c is 13.9%. She was found to have mildly elevated AG of 18 on admission with bicarb of 17, BG in the 600's, but had a lactate of 3.8 as well, and only trace ketones in the urine. She was admitted to the CCU on an insulin gtt and has now been transitioned to basal insulin. She reports that she was diagnosed with DM2 in about , initially had gestational DM that did not resolve after the birth of her son. She takes Metformin 1 gram BID, stopped taking it the last two weeks as she wasn't feeling well. She usually checks BG once a day, in the morning, and notes that her BG ranges from 150 to 200 or so. She does not have sx of neuropathy in the feet, no known nephropathy. Last saw optho in , she is not aware of any retinopathy and has never received laser or injections to the eyes. Diet wise, she usually eats two meals a day and typically has rice, roti, mills etc. Does not drink soda or juice.    She is agreeable to taking basal bolus insulin at home, if needed.    PAST MEDICAL & SURGICAL HISTORY:  Benign essential HTN    T2DM (type 2 diabetes mellitus)    H/O  section    FAMILY HISTORY:  DM2 in parents and her 13 year old daughter     Social History:  no cigarette use    from [facility]     Outpatient Medications:  Metformin 1 gram BID    MEDICATIONS  (STANDING):  aspirin  chewable 81 milliGRAM(s) Oral daily  atorvastatin 20 milliGRAM(s) Oral at bedtime  dextrose 40% Gel 15 Gram(s) Oral once  dextrose 5%. 1000 milliLiter(s) (50 mL/Hr) IV Continuous <Continuous>  dextrose 5%. 1000 milliLiter(s) (100 mL/Hr) IV Continuous <Continuous>  dextrose 50% Injectable 25 Gram(s) IV Push once  dextrose 50% Injectable 12.5 Gram(s) IV Push once  dextrose 50% Injectable 25 Gram(s) IV Push once  furosemide   Injectable 40 milliGRAM(s) IV Push two times a day  glucagon  Injectable 1 milliGRAM(s) IntraMuscular once  heparin  Infusion 1000 Unit(s)/Hr (10 mL/Hr) IV Continuous <Continuous>  insulin glargine Injectable (LANTUS) 15 Unit(s) SubCutaneous at bedtime  insulin lispro (ADMELOG) corrective regimen sliding scale   SubCutaneous three times a day before meals  insulin lispro (ADMELOG) corrective regimen sliding scale   SubCutaneous at bedtime  losartan 25 milliGRAM(s) Oral daily  metoprolol tartrate 12.5 milliGRAM(s) Oral two times a day  sodium chloride 0.45%. 1000 milliLiter(s) (50 mL/Hr) IV Continuous <Continuous>    MEDICATIONS  (PRN):      Allergies  No Known Allergies    Review of Systems:  Constitutional: No fever  Eyes: No blurry vision  Neuro: No tremors  HEENT: No pain  Cardiovascular: chest pain improved, no palpitations  Respiratory: SOB improved, no cough  GI: No nausea, vomiting, abdominal pain  : No dysuria  Skin: no rash  Endocrine: no polyuria, polydipsia    ALL OTHER SYSTEMS REVIEWED AND NEGATIVE    PHYSICAL EXAM:  VITALS: T(C): 36.6 (21 @ 07:00)  T(F): 97.8 (21 @ 07:00), Max: 98 (21 @ 13:38)  HR: 102 (21 @ 11:00) (84 - 133)  BP: 128/83 (21 @ 11:00) (95/71 - 173/126)  RR:  (15 - 31)  SpO2:  (92% - 100%)  Wt(kg): --  GENERAL: NAD, well-developed  EYES: No proptosis, anicteric  HEENT:  Atraumatic, Normocephalic, moist mucous membranes  THYROID: Normal size, no palpable nodules  RESPIRATORY: Clear to auscultation bilaterally; No rales, rhonchi, wheezing  CARDIOVASCULAR: Regular rate and rhythm; No murmurs; no peripheral edema  GI: Soft, nontender, non distended, normal bowel sounds  SKIN: Dry, intact, No rashes or lesions  MUSCULOSKELETAL: Full range of motion, normal strength  PSYCH: Alert and oriented x 3, reactive affect, euthymic mood     POCT Blood Glucose.: 259 mg/dL (21 @ 11:22) A 6   POCT Blood Glucose.: 149 mg/dL (21 @ 07:26)  POCT Blood Glucose.: 154 mg/dL (21 @ 05:04)  POCT Blood Glucose.: 171 mg/dL (21 @ 03:01)  POCT Blood Glucose.: 201 mg/dL (21 @ 02:09)  POCT Blood Glucose.: 194 mg/dL (21 @ 00:49) L 15   POCT Blood Glucose.: 207 mg/dL (21 @ 23:44)  POCT Blood Glucose.: 229 mg/dL (21 @ 22:43)  POCT Blood Glucose.: 241 mg/dL (21 @ 22:00)  POCT Blood Glucose.: 307 mg/dL (21 @ 20:58)  POCT Blood Glucose.: 316 mg/dL (21 @ 20:06)  POCT Blood Glucose.: 278 mg/dL (21 @ 19:07)  POCT Blood Glucose.: 293 mg/dL (21 @ 18:03)  POCT Blood Glucose.: 392 mg/dL (21 @ 17:02)  POCT Blood Glucose.: 548 mg/dL (21 @ 15:46)  POCT Blood Glucose.: 533 mg/dL (21 @ 13:43)                          13.2   13.76 )-----------( 311      ( 24 Mar 2021 06:23 )             42.0           136  |  99  |  20  ----------------------------<  270<H>  3.7   |  24  |  0.52    EGFR if : 130  EGFR if non : 112    Ca    9.2      -  Mg     1.7       Phos  3.9         TPro  7.5  /  Alb  4.0  /  TBili  0.6  /  DBili  x   /  AST  29  /  ALT  25  /  AlkPhos  104        Thyroid Function Tests:   @ 14:38 TSH 1.77 FreeT4 -- T3 -- Anti TPO -- Anti Thyroglobulin Ab -- TSI --    A1c 13.9%

## 2021-03-24 NOTE — PHYSICAL THERAPY INITIAL EVALUATION ADULT - PERTINENT HX OF CURRENT PROBLEM, REHAB EVAL
patient is a 50 year old female admitted for fluid overload, acute CHF, and mild DKA. PMH listed below

## 2021-03-24 NOTE — CONSULT NOTE ADULT - ASSESSMENT
50 year old woman with PMH HTN, HLD, uncontrolled DM2, p/w worsening shortness of breath and chest pain for 1 week, admitted for management of acute CHF as well as hyperglycemia the 600's. Consult called for management of uncontrolled DM2. A1c is 13.9%. High risk patient with high level decision making, at high risk of worsening microvascular and macrovascular complications. BG goal in the ICU setting is 140-180 mg/dL.

## 2021-03-24 NOTE — PROGRESS NOTE ADULT - SUBJECTIVE AND OBJECTIVE BOX
Patient is a 50y old  Female who presents with a chief complaint of     INTERVAL HPI/OVERNIGHT EVENTS:   No overnight events   Afebrile, hemodynamically stable     ICU Vital Signs Last 24 Hrs  T(C): 36.6 (24 Mar 2021 04:00), Max: 36.7 (23 Mar 2021 13:38)  T(F): 97.8 (24 Mar 2021 04:00), Max: 98 (23 Mar 2021 13:38)  HR: 90 (24 Mar 2021 07:00) (84 - 133)  BP: 134/86 (24 Mar 2021 06:00) (95/71 - 173/126)  BP(mean): 100 (24 Mar 2021 06:00) (76 - 121)  ABP: --  ABP(mean): --  RR: 17 (24 Mar 2021 07:00) (15 - 31)  SpO2: 100% (24 Mar 2021 07:00) (92% - 100%)    I&O's Summary    23 Mar 2021 07:01  -  24 Mar 2021 07:00  --------------------------------------------------------  IN: 822.5 mL / OUT: 2180 mL / NET: -1357.5 mL          Daily Height in cm: 160.02 (23 Mar 2021 15:16)    Daily Weight in k.2 (24 Mar 2021 05:00)    LABS:                        13.2   13.76 )-----------( 311      ( 24 Mar 2021 06:23 )             42.0     03-24    136  |  99  |  19  ----------------------------<  160<H>  3.9   |  22  |  0.55    Ca    9.9      24 Mar 2021 06:23  Phos  4.5     03-24  Mg     1.9     -24    TPro  7.5  /  Alb  4.0  /  TBili  0.6  /  DBili  x   /  AST  29  /  ALT  25  /  AlkPhos  104  03-23    PT/INR - ( 23 Mar 2021 14:37 )   PT: 11.2 sec;   INR: 0.97 ratio         PTT - ( 23 Mar 2021 14:37 )  PTT:28.5 sec  Urinalysis Basic - ( 23 Mar 2021 22:57 )    Color: Light Yellow / Appearance: Clear / S.027 / pH: x  Gluc: x / Ketone: Moderate  / Bili: Negative / Urobili: <2 mg/dL   Blood: x / Protein: Trace / Nitrite: Negative   Leuk Esterase: Negative / RBC: 2 /HPF / WBC 2 /HPF   Sq Epi: x / Non Sq Epi: 4 /HPF / Bacteria: Few      CAPILLARY BLOOD GLUCOSE      POCT Blood Glucose.: 154 mg/dL (24 Mar 2021 05:04)  POCT Blood Glucose.: 171 mg/dL (24 Mar 2021 03:01)  POCT Blood Glucose.: 201 mg/dL (24 Mar 2021 02:09)  POCT Blood Glucose.: 194 mg/dL (24 Mar 2021 00:49)  POCT Blood Glucose.: 207 mg/dL (23 Mar 2021 23:44)  POCT Blood Glucose.: 229 mg/dL (23 Mar 2021 22:43)  POCT Blood Glucose.: 241 mg/dL (23 Mar 2021 22:00)  POCT Blood Glucose.: 307 mg/dL (23 Mar 2021 20:58)  POCT Blood Glucose.: 316 mg/dL (23 Mar 2021 20:06)  POCT Blood Glucose.: 278 mg/dL (23 Mar 2021 19:07)  POCT Blood Glucose.: 293 mg/dL (23 Mar 2021 18:03)  POCT Blood Glucose.: 392 mg/dL (23 Mar 2021 17:02)  POCT Blood Glucose.: 548 mg/dL (23 Mar 2021 15:46)  POCT Blood Glucose.: 533 mg/dL (23 Mar 2021 13:43)              RADIOLOGY & ADDITIONAL TESTS:    Consultant(s) Notes Reviewed:  [x ] YES  [ ] NO    MEDICATIONS  (STANDING):  dextrose 40% Gel 15 Gram(s) Oral once  dextrose 5%. 1000 milliLiter(s) (50 mL/Hr) IV Continuous <Continuous>  dextrose 5%. 1000 milliLiter(s) (100 mL/Hr) IV Continuous <Continuous>  dextrose 50% Injectable 25 Gram(s) IV Push once  dextrose 50% Injectable 12.5 Gram(s) IV Push once  dextrose 50% Injectable 25 Gram(s) IV Push once  furosemide   Injectable 40 milliGRAM(s) IV Push two times a day  glucagon  Injectable 1 milliGRAM(s) IntraMuscular once  heparin   Injectable 5000 Unit(s) SubCutaneous every 12 hours  insulin glargine Injectable (LANTUS) 15 Unit(s) SubCutaneous at bedtime  insulin lispro (ADMELOG) corrective regimen sliding scale   SubCutaneous three times a day before meals  insulin lispro (ADMELOG) corrective regimen sliding scale   SubCutaneous at bedtime  sodium chloride 0.45%. 1000 milliLiter(s) (50 mL/Hr) IV Continuous <Continuous>    MEDICATIONS  (PRN):      PHYSICAL EXAM:  GENERAL:   HEAD:  Atraumatic, Normocephalic  EYES: EOMI, PERRLA, conjunctiva and sclera clear  NECK: Supple, No JVD, Normal thyroid, no enlarged nodes  NERVOUS SYSTEM:  Alert & Awake.   CHEST/LUNG: B/L good air entry; No rales, rhonchi, or wheezing  HEART: S1S2 normal, no S3, Regular rate and rhythm; No murmurs  ABDOMEN: Soft, Nontender, Nondistended; Bowel sounds present  EXTREMITIES:  2+ Peripheral Pulses, No clubbing, cyanosis, or edema  LYMPH: No lymphadenopathy noted  SKIN: No rashes or lesions    Care Discussed with Consultants/Other Providers [ x] YES  [ ] NO   Patient is a 50y old  Female who presents with a chief complaint of     INTERVAL HPI/OVERNIGHT EVENTS:   No overnight events. Denies chest pain, SOB, abdominal pain, pain in extremities, or swelling in the extremities.     ICU Vital Signs Last 24 Hrs  T(C): 36.6 (24 Mar 2021 04:00), Max: 36.7 (23 Mar 2021 13:38)  T(F): 97.8 (24 Mar 2021 04:00), Max: 98 (23 Mar 2021 13:38)  HR: 90 (24 Mar 2021 07:00) (84 - 133)  BP: 134/86 (24 Mar 2021 06:00) (95/71 - 173/126)  BP(mean): 100 (24 Mar 2021 06:00) (76 - 121)  RR: 17 (24 Mar 2021 07:00) (15 - 31)  SpO2: 100% (24 Mar 2021 07:00) (92% - 100%)    I&O's Summary    23 Mar 2021 07:01  -  24 Mar 2021 07:00  --------------------------------------------------------  IN: 822.5 mL / OUT: 2180 mL / NET: -1357.5 mL          Daily Height in cm: 160.02 (23 Mar 2021 15:16)    Daily Weight in k.2 (24 Mar 2021 05:00)    LABS:                        13.2   13.76 )-----------( 311      ( 24 Mar 2021 06:23 )             42.0     03-24    136  |  99  |  19  ----------------------------<  160<H>  3.9   |  22  |  0.55    Ca    9.9      24 Mar 2021 06:23  Phos  4.5     03-24  Mg     1.9     -24    TPro  7.5  /  Alb  4.0  /  TBili  0.6  /  DBili  x   /  AST  29  /  ALT  25  /  AlkPhos  104  03-23    PT/INR - ( 23 Mar 2021 14:37 )   PT: 11.2 sec;   INR: 0.97 ratio         PTT - ( 23 Mar 2021 14:37 )  PTT:28.5 sec  Urinalysis Basic - ( 23 Mar 2021 22:57 )    Color: Light Yellow / Appearance: Clear / S.027 / pH: x  Gluc: x / Ketone: Moderate  / Bili: Negative / Urobili: <2 mg/dL   Blood: x / Protein: Trace / Nitrite: Negative   Leuk Esterase: Negative / RBC: 2 /HPF / WBC 2 /HPF   Sq Epi: x / Non Sq Epi: 4 /HPF / Bacteria: Few      CAPILLARY BLOOD GLUCOSE      POCT Blood Glucose.: 154 mg/dL (24 Mar 2021 05:04)  POCT Blood Glucose.: 171 mg/dL (24 Mar 2021 03:01)  POCT Blood Glucose.: 201 mg/dL (24 Mar 2021 02:09)  POCT Blood Glucose.: 194 mg/dL (24 Mar 2021 00:49)  POCT Blood Glucose.: 207 mg/dL (23 Mar 2021 23:44)  POCT Blood Glucose.: 229 mg/dL (23 Mar 2021 22:43)  POCT Blood Glucose.: 241 mg/dL (23 Mar 2021 22:00)  POCT Blood Glucose.: 307 mg/dL (23 Mar 2021 20:58)  POCT Blood Glucose.: 316 mg/dL (23 Mar 2021 20:06)  POCT Blood Glucose.: 278 mg/dL (23 Mar 2021 19:07)  POCT Blood Glucose.: 293 mg/dL (23 Mar 2021 18:03)  POCT Blood Glucose.: 392 mg/dL (23 Mar 2021 17:02)  POCT Blood Glucose.: 548 mg/dL (23 Mar 2021 15:46)  POCT Blood Glucose.: 533 mg/dL (23 Mar 2021 13:43)              RADIOLOGY & ADDITIONAL TESTS:    Consultant(s) Notes Reviewed:  [x ] YES  [ ] NO    MEDICATIONS  (STANDING):  dextrose 40% Gel 15 Gram(s) Oral once  dextrose 5%. 1000 milliLiter(s) (50 mL/Hr) IV Continuous <Continuous>  dextrose 5%. 1000 milliLiter(s) (100 mL/Hr) IV Continuous <Continuous>  dextrose 50% Injectable 25 Gram(s) IV Push once  dextrose 50% Injectable 12.5 Gram(s) IV Push once  dextrose 50% Injectable 25 Gram(s) IV Push once  furosemide   Injectable 40 milliGRAM(s) IV Push two times a day  glucagon  Injectable 1 milliGRAM(s) IntraMuscular once  heparin   Injectable 5000 Unit(s) SubCutaneous every 12 hours  insulin glargine Injectable (LANTUS) 15 Unit(s) SubCutaneous at bedtime  insulin lispro (ADMELOG) corrective regimen sliding scale   SubCutaneous three times a day before meals  insulin lispro (ADMELOG) corrective regimen sliding scale   SubCutaneous at bedtime  sodium chloride 0.45%. 1000 milliLiter(s) (50 mL/Hr) IV Continuous <Continuous>    MEDICATIONS  (PRN):      PHYSICAL EXAM:  GENERAL: not in acute distress, breathing comfortably on 3L O2  HEAD:  Atraumatic, Normocephalic  EYES: EOMI, PERRLA, conjunctiva and sclera clear  NECK: Supple, No JVD, Normal thyroid, no enlarged nodes  NERVOUS SYSTEM:  A&O x3  CHEST/LUNG: B/L good air entry; No rales, rhonchi, or wheezing  HEART: S1S2 normal, no S3, Regular rate and rhythm; No murmurs  ABDOMEN: Soft, Nontender, Nondistended; Bowel sounds present  EXTREMITIES:  2+ Peripheral Pulses, No clubbing, or cyanosis. minimal pitting edema on ankle b/l  LYMPH: No lymphadenopathy noted  SKIN: No rashes or lesions; b/l soles of foot has black dirt? on them but no active signs of drainage, pain, or swelling upon examination    Care Discussed with Consultants/Other Providers [ x] YES  [ ] NO

## 2021-03-25 LAB
ANION GAP SERPL CALC-SCNC: 13 MMOL/L — SIGNIFICANT CHANGE UP (ref 7–14)
APTT BLD: 111.2 SEC — HIGH (ref 27–36.3)
APTT BLD: 75 SEC — HIGH (ref 27–36.3)
BUN SERPL-MCNC: 21 MG/DL — SIGNIFICANT CHANGE UP (ref 7–23)
CALCIUM SERPL-MCNC: 9.1 MG/DL — SIGNIFICANT CHANGE UP (ref 8.4–10.5)
CHLORIDE SERPL-SCNC: 99 MMOL/L — SIGNIFICANT CHANGE UP (ref 98–107)
CO2 SERPL-SCNC: 20 MMOL/L — LOW (ref 22–31)
CREAT SERPL-MCNC: 0.52 MG/DL — SIGNIFICANT CHANGE UP (ref 0.5–1.3)
GLUCOSE BLDC GLUCOMTR-MCNC: 177 MG/DL — HIGH (ref 70–99)
GLUCOSE BLDC GLUCOMTR-MCNC: 182 MG/DL — HIGH (ref 70–99)
GLUCOSE BLDC GLUCOMTR-MCNC: 190 MG/DL — HIGH (ref 70–99)
GLUCOSE BLDC GLUCOMTR-MCNC: 192 MG/DL — HIGH (ref 70–99)
GLUCOSE BLDC GLUCOMTR-MCNC: 88 MG/DL — SIGNIFICANT CHANGE UP (ref 70–99)
GLUCOSE SERPL-MCNC: 220 MG/DL — HIGH (ref 70–99)
HCT VFR BLD CALC: 40.1 % — SIGNIFICANT CHANGE UP (ref 34.5–45)
HGB BLD-MCNC: 12.9 G/DL — SIGNIFICANT CHANGE UP (ref 11.5–15.5)
MAGNESIUM SERPL-MCNC: 2.3 MG/DL — SIGNIFICANT CHANGE UP (ref 1.6–2.6)
MCHC RBC-ENTMCNC: 25.5 PG — LOW (ref 27–34)
MCHC RBC-ENTMCNC: 32.2 GM/DL — SIGNIFICANT CHANGE UP (ref 32–36)
MCV RBC AUTO: 79.2 FL — LOW (ref 80–100)
NRBC # BLD: 0 /100 WBCS — SIGNIFICANT CHANGE UP
NRBC # FLD: 0 K/UL — SIGNIFICANT CHANGE UP
PHOSPHATE SERPL-MCNC: 3 MG/DL — SIGNIFICANT CHANGE UP (ref 2.5–4.5)
PLATELET # BLD AUTO: 326 K/UL — SIGNIFICANT CHANGE UP (ref 150–400)
POTASSIUM SERPL-MCNC: 5.2 MMOL/L — SIGNIFICANT CHANGE UP (ref 3.5–5.3)
POTASSIUM SERPL-SCNC: 5.2 MMOL/L — SIGNIFICANT CHANGE UP (ref 3.5–5.3)
RBC # BLD: 5.06 M/UL — SIGNIFICANT CHANGE UP (ref 3.8–5.2)
RBC # FLD: 13.9 % — SIGNIFICANT CHANGE UP (ref 10.3–14.5)
SODIUM SERPL-SCNC: 132 MMOL/L — LOW (ref 135–145)
WBC # BLD: 15.17 K/UL — HIGH (ref 3.8–10.5)
WBC # FLD AUTO: 15.17 K/UL — HIGH (ref 3.8–10.5)

## 2021-03-25 PROCEDURE — 99233 SBSQ HOSP IP/OBS HIGH 50: CPT

## 2021-03-25 PROCEDURE — 99232 SBSQ HOSP IP/OBS MODERATE 35: CPT

## 2021-03-25 PROCEDURE — 93456 R HRT CORONARY ARTERY ANGIO: CPT | Mod: 26

## 2021-03-25 RX ORDER — HEPARIN SODIUM 5000 [USP'U]/ML
800 INJECTION INTRAVENOUS; SUBCUTANEOUS
Qty: 25000 | Refills: 0 | Status: DISCONTINUED | OUTPATIENT
Start: 2021-03-26 | End: 2021-03-27

## 2021-03-25 RX ORDER — INSULIN LISPRO 100/ML
6 VIAL (ML) SUBCUTANEOUS
Refills: 0 | Status: DISCONTINUED | OUTPATIENT
Start: 2021-03-25 | End: 2021-03-29

## 2021-03-25 RX ORDER — INSULIN GLARGINE 100 [IU]/ML
17 INJECTION, SOLUTION SUBCUTANEOUS AT BEDTIME
Refills: 0 | Status: DISCONTINUED | OUTPATIENT
Start: 2021-03-25 | End: 2021-03-29

## 2021-03-25 RX ADMIN — HEPARIN SODIUM 11 UNIT(S)/HR: 5000 INJECTION INTRAVENOUS; SUBCUTANEOUS at 06:34

## 2021-03-25 RX ADMIN — Medication 40 MILLIGRAM(S): at 05:57

## 2021-03-25 RX ADMIN — Medication 12.5 MILLIGRAM(S): at 18:53

## 2021-03-25 RX ADMIN — CHLORHEXIDINE GLUCONATE 1 APPLICATION(S): 213 SOLUTION TOPICAL at 12:01

## 2021-03-25 RX ADMIN — INSULIN GLARGINE 17 UNIT(S): 100 INJECTION, SOLUTION SUBCUTANEOUS at 22:14

## 2021-03-25 RX ADMIN — Medication 81 MILLIGRAM(S): at 12:00

## 2021-03-25 RX ADMIN — Medication 5 UNIT(S): at 12:01

## 2021-03-25 RX ADMIN — Medication 1: at 18:51

## 2021-03-25 RX ADMIN — Medication 1: at 12:00

## 2021-03-25 RX ADMIN — Medication 6 UNIT(S): at 18:51

## 2021-03-25 RX ADMIN — LOSARTAN POTASSIUM 25 MILLIGRAM(S): 100 TABLET, FILM COATED ORAL at 05:57

## 2021-03-25 RX ADMIN — Medication 5 UNIT(S): at 07:49

## 2021-03-25 RX ADMIN — ATORVASTATIN CALCIUM 20 MILLIGRAM(S): 80 TABLET, FILM COATED ORAL at 22:14

## 2021-03-25 RX ADMIN — Medication 1: at 07:49

## 2021-03-25 RX ADMIN — Medication 12.5 MILLIGRAM(S): at 05:57

## 2021-03-25 NOTE — CHART NOTE - NSCHARTNOTEFT_GEN_A_CORE
4Fr RFA and 7Fr RFV sheath removed, good hemostasis achieved with 15 minute manual hold. No evidence of hematoma, bruit, or pseudoaneurysm noted pre or post removal. B/L pedal pulses minimally palpable but R PT audible with doppler, L DP and PT audible with doppler. DSD in place. Plan to restart Heparin gtt 6 hour post sheath pull.

## 2021-03-25 NOTE — DIETITIAN INITIAL EVALUATION ADULT. - OTHER INFO
Per chart review patient with medical history of T2DM  and Hypertension presents with progressive  worsening shortness of breath, orthopnea and chest pain for 1 wk in the setting of not taking medication admitted for  fluid overload 2/2 acute heart failure and ?mild DKA. Patient tolerating diet in-house. No swallowing/chewing difficulties. NKFA. Informed patient of HbA1c. Elevated HbA1c likely in setting of not taking medications for glycemic control. Extensive diet education and written instructions provided on Consistent Carbohydrate diet, Carb Counting, Label Reading and Portion Control. Patient verbalized understanding of diet modifications.     Patient endorses weight loss over the past year which she attributes eating smaller portions of meal than usual. Patient notes that she used to eat "too much" and now eats until she is satisfied. UBW ~150 pounds  -> current weight 131.9 pounds. No GI distress (nausea/vomiting/diarrhea/constipation) noted at this time.

## 2021-03-25 NOTE — DIETITIAN INITIAL EVALUATION ADULT. - ENTER TO (G/KG)
What Type Of Note Output Would You Prefer (Optional)?: Standard Output How Severe Is Your Rash?: moderate Is This A New Presentation, Or A Follow-Up?: Rash 1.4

## 2021-03-25 NOTE — PROGRESS NOTE ADULT - SUBJECTIVE AND OBJECTIVE BOX
Chief Complaint: f/u DM2    History:  Patient seen at bedside this morning, tolerating po, does not have nausea, vomiting, abdominal pain. SOB improved. BG now < 200 mg/dL.     MEDICATIONS  (STANDING):  aspirin enteric coated 81 milliGRAM(s) Oral daily  atorvastatin 20 milliGRAM(s) Oral at bedtime  chlorhexidine 4% Liquid 1 Application(s) Topical daily  dextrose 40% Gel 15 Gram(s) Oral once  dextrose 5%. 1000 milliLiter(s) (50 mL/Hr) IV Continuous <Continuous>  dextrose 5%. 1000 milliLiter(s) (100 mL/Hr) IV Continuous <Continuous>  dextrose 50% Injectable 25 Gram(s) IV Push once  dextrose 50% Injectable 12.5 Gram(s) IV Push once  dextrose 50% Injectable 25 Gram(s) IV Push once  glucagon  Injectable 1 milliGRAM(s) IntraMuscular once  heparin  Infusion 1100 Unit(s)/Hr (10 mL/Hr) IV Continuous <Continuous>  insulin glargine Injectable (LANTUS) 15 Unit(s) SubCutaneous at bedtime  insulin lispro (ADMELOG) corrective regimen sliding scale   SubCutaneous three times a day before meals  insulin lispro (ADMELOG) corrective regimen sliding scale   SubCutaneous at bedtime  insulin lispro Injectable (ADMELOG) 5 Unit(s) SubCutaneous three times a day before meals  losartan 25 milliGRAM(s) Oral daily  metoprolol tartrate 12.5 milliGRAM(s) Oral two times a day    MEDICATIONS  (PRN):    Allergies  No Known Allergies    Review of Systems:  ALL OTHER SYSTEMS REVIEWED AND NEGATIVE    PHYSICAL EXAM:  VITALS: T(C): 36.8 (03-25-21 @ 08:00)  T(F): 98.2 (03-25-21 @ 08:00), Max: 98.2 (03-25-21 @ 08:00)  HR: 92 (03-25-21 @ 12:00) (78 - 111)  BP: 102/72 (03-25-21 @ 12:00) (79/55 - 119/75)  RR:  (10 - 31)  SpO2:  (93% - 100%)  Wt(kg): --  GENERAL: NAD, well-developed  EYES: No proptosis, anicteric  HEENT:  Atraumatic, Normocephalic  RESPIRATORY: + air movement bilaterally, no respiratory distress   PSYCH: Alert and oriented x 3, reactive affect, euthymic mood     POCT Blood Glucose.: 192 mg/dL (03-25-21 @ 11:29)  POCT Blood Glucose.: 182 mg/dL (03-25-21 @ 07:44)  POCT Blood Glucose.: 198 mg/dL (03-24-21 @ 23:12)  POCT Blood Glucose.: 110 mg/dL (03-24-21 @ 16:49)  POCT Blood Glucose.: 259 mg/dL (03-24-21 @ 11:22)  POCT Blood Glucose.: 149 mg/dL (03-24-21 @ 07:26)  POCT Blood Glucose.: 154 mg/dL (03-24-21 @ 05:04)  POCT Blood Glucose.: 171 mg/dL (03-24-21 @ 03:01)  POCT Blood Glucose.: 201 mg/dL (03-24-21 @ 02:09)  POCT Blood Glucose.: 194 mg/dL (03-24-21 @ 00:49)  POCT Blood Glucose.: 207 mg/dL (03-23-21 @ 23:44)  POCT Blood Glucose.: 229 mg/dL (03-23-21 @ 22:43)  POCT Blood Glucose.: 241 mg/dL (03-23-21 @ 22:00)  POCT Blood Glucose.: 307 mg/dL (03-23-21 @ 20:58)  POCT Blood Glucose.: 316 mg/dL (03-23-21 @ 20:06)  POCT Blood Glucose.: 278 mg/dL (03-23-21 @ 19:07)  POCT Blood Glucose.: 293 mg/dL (03-23-21 @ 18:03)  POCT Blood Glucose.: 392 mg/dL (03-23-21 @ 17:02)  POCT Blood Glucose.: 548 mg/dL (03-23-21 @ 15:46)  POCT Blood Glucose.: 533 mg/dL (03-23-21 @ 13:43)      03-25    132<L>  |  99  |  21  ----------------------------<  220<H>  5.2   |  20<L>  |  0.52    EGFR if : 130  EGFR if non : 112    Ca    9.1      03-25  Mg     2.3     03-25  Phos  3.0     03-25    TPro  7.5  /  Alb  4.0  /  TBili  0.6  /  DBili  x   /  AST  29  /  ALT  25  /  AlkPhos  104  03-23          Thyroid Function Tests:  03-23 @ 14:38 TSH 1.77 FreeT4 -- T3 -- Anti TPO -- Anti Thyroglobulin Ab -- TSI --

## 2021-03-25 NOTE — PROGRESS NOTE ADULT - SUBJECTIVE AND OBJECTIVE BOX
Patient is a 50y old  Female who presents with a chief complaint of hyperglycemia/CHF (24 Mar 2021 12:06)      INTERVAL HPI/OVERNIGHT EVENTS:   No overnight events   Afebrile, hemodynamically stable     ICU Vital Signs Last 24 Hrs  T(C): 36.8 (25 Mar 2021 08:00), Max: 36.8 (25 Mar 2021 08:00)  T(F): 98.2 (25 Mar 2021 08:00), Max: 98.2 (25 Mar 2021 08:00)  HR: 80 (25 Mar 2021 08:00) (78 - 111)  BP: 97/70 (25 Mar 2021 08:00) (79/62 - 134/84)  BP(mean): 80 (25 Mar 2021 08:00) (69 - 99)  ABP: --  ABP(mean): --  RR: 23 (25 Mar 2021 08:00) (10 - 31)  SpO2: 98% (25 Mar 2021 08:00) (93% - 99%)    I&O's Summary    24 Mar 2021 07:01  -  25 Mar 2021 07:00  --------------------------------------------------------  IN: 1512 mL / OUT: 1728 mL / NET: -216 mL    25 Mar 2021 07:01  -  25 Mar 2021 08:20  --------------------------------------------------------  IN: 262 mL / OUT: 120 mL / NET: 142 mL          Daily     Daily Weight in k.6 (25 Mar 2021 06:00)    LABS:                        12.9   15.17 )-----------( 326      ( 25 Mar 2021 02:49 )             40.1     03-25    132<L>  |  99  |  21  ----------------------------<  220<H>  5.2   |  20<L>  |  0.52    Ca    9.1      25 Mar 2021 02:49  Phos  3.0     03-25  Mg     2.3     03-25    TPro  7.5  /  Alb  4.0  /  TBili  0.6  /  DBili  x   /  AST  29  /  ALT  25  /  AlkPhos  104  03-23    PT/INR - ( 23 Mar 2021 14:37 )   PT: 11.2 sec;   INR: 0.97 ratio         PTT - ( 25 Mar 2021 02:49 )  PTT:75.0 sec  Urinalysis Basic - ( 23 Mar 2021 22:57 )    Color: Light Yellow / Appearance: Clear / S.027 / pH: x  Gluc: x / Ketone: Moderate  / Bili: Negative / Urobili: <2 mg/dL   Blood: x / Protein: Trace / Nitrite: Negative   Leuk Esterase: Negative / RBC: 2 /HPF / WBC 2 /HPF   Sq Epi: x / Non Sq Epi: 4 /HPF / Bacteria: Few      CAPILLARY BLOOD GLUCOSE      POCT Blood Glucose.: 182 mg/dL (25 Mar 2021 07:44)  POCT Blood Glucose.: 198 mg/dL (24 Mar 2021 23:12)  POCT Blood Glucose.: 110 mg/dL (24 Mar 2021 16:49)  POCT Blood Glucose.: 259 mg/dL (24 Mar 2021 11:22)              RADIOLOGY & ADDITIONAL TESTS:    Consultant(s) Notes Reviewed:  [x ] YES  [ ] NO    MEDICATIONS  (STANDING):  aspirin enteric coated 81 milliGRAM(s) Oral daily  atorvastatin 20 milliGRAM(s) Oral at bedtime  chlorhexidine 4% Liquid 1 Application(s) Topical daily  dextrose 40% Gel 15 Gram(s) Oral once  dextrose 5%. 1000 milliLiter(s) (50 mL/Hr) IV Continuous <Continuous>  dextrose 5%. 1000 milliLiter(s) (100 mL/Hr) IV Continuous <Continuous>  dextrose 50% Injectable 25 Gram(s) IV Push once  dextrose 50% Injectable 12.5 Gram(s) IV Push once  dextrose 50% Injectable 25 Gram(s) IV Push once  furosemide   Injectable 40 milliGRAM(s) IV Push two times a day  glucagon  Injectable 1 milliGRAM(s) IntraMuscular once  heparin  Infusion 1100 Unit(s)/Hr (11 mL/Hr) IV Continuous <Continuous>  insulin glargine Injectable (LANTUS) 15 Unit(s) SubCutaneous at bedtime  insulin lispro (ADMELOG) corrective regimen sliding scale   SubCutaneous three times a day before meals  insulin lispro (ADMELOG) corrective regimen sliding scale   SubCutaneous at bedtime  insulin lispro Injectable (ADMELOG) 5 Unit(s) SubCutaneous three times a day before meals  losartan 25 milliGRAM(s) Oral daily  metoprolol tartrate 12.5 milliGRAM(s) Oral two times a day    MEDICATIONS  (PRN):      PHYSICAL EXAM:  GENERAL:   HEAD:  Atraumatic, Normocephalic  EYES: EOMI, PERRLA, conjunctiva and sclera clear  NECK: Supple, No JVD, Normal thyroid, no enlarged nodes  NERVOUS SYSTEM:  Alert & Awake.   CHEST/LUNG: B/L good air entry; No rales, rhonchi, or wheezing  HEART: S1S2 normal, no S3, Regular rate and rhythm; No murmurs  ABDOMEN: Soft, Nontender, Nondistended; Bowel sounds present  EXTREMITIES:  2+ Peripheral Pulses, No clubbing, cyanosis, or edema  LYMPH: No lymphadenopathy noted  SKIN: No rashes or lesions    Care Discussed with Consultants/Other Providers [ x] YES  [ ] NO   Patient is a 50y old  Female who presents with a chief complaint of hyperglycemia/CHF (24 Mar 2021 12:06)      INTERVAL HPI/OVERNIGHT EVENTS:   No overnight events. Denies chest pain, SOB, abdominal pain, pain or swelling in the extremities.    ICU Vital Signs Last 24 Hrs  T(C): 36.8 (25 Mar 2021 08:00), Max: 36.8 (25 Mar 2021 08:00)  T(F): 98.2 (25 Mar 2021 08:00), Max: 98.2 (25 Mar 2021 08:00)  HR: 80 (25 Mar 2021 08:00) (78 - 111)  BP: 97/70 (25 Mar 2021 08:00) (79/62 - 134/84)  BP(mean): 80 (25 Mar 2021 08:00) (69 - 99)  RR: 23 (25 Mar 2021 08:00) (10 - 31)  SpO2: 98% (25 Mar 2021 08:00) (93% - 99%)    I&O's Summary    24 Mar 2021 07:01  -  25 Mar 2021 07:00  --------------------------------------------------------  IN: 1512 mL / OUT: 1728 mL / NET: -216 mL    25 Mar 2021 07:01  -  25 Mar 2021 08:20  --------------------------------------------------------  IN: 262 mL / OUT: 120 mL / NET: 142 mL          Daily     Daily Weight in k.6 (25 Mar 2021 06:00)    LABS:                        12.9   15.17 )-----------( 326      ( 25 Mar 2021 02:49 )             40.1     03-25    132<L>  |  99  |  21  ----------------------------<  220<H>  5.2   |  20<L>  |  0.52    Ca    9.1      25 Mar 2021 02:49  Phos  3.0     03-25  Mg     2.3     03-25    TPro  7.5  /  Alb  4.0  /  TBili  0.6  /  DBili  x   /  AST  29  /  ALT  25  /  AlkPhos  104  03-23    PT/INR - ( 23 Mar 2021 14:37 )   PT: 11.2 sec;   INR: 0.97 ratio         PTT - ( 25 Mar 2021 02:49 )  PTT:75.0 sec  Urinalysis Basic - ( 23 Mar 2021 22:57 )    Color: Light Yellow / Appearance: Clear / S.027 / pH: x  Gluc: x / Ketone: Moderate  / Bili: Negative / Urobili: <2 mg/dL   Blood: x / Protein: Trace / Nitrite: Negative   Leuk Esterase: Negative / RBC: 2 /HPF / WBC 2 /HPF   Sq Epi: x / Non Sq Epi: 4 /HPF / Bacteria: Few      CAPILLARY BLOOD GLUCOSE      POCT Blood Glucose.: 182 mg/dL (25 Mar 2021 07:44)  POCT Blood Glucose.: 198 mg/dL (24 Mar 2021 23:12)  POCT Blood Glucose.: 110 mg/dL (24 Mar 2021 16:49)  POCT Blood Glucose.: 259 mg/dL (24 Mar 2021 11:22)              RADIOLOGY & ADDITIONAL TESTS:    Consultant(s) Notes Reviewed:  [x ] YES  [ ] NO    MEDICATIONS  (STANDING):  aspirin enteric coated 81 milliGRAM(s) Oral daily  atorvastatin 20 milliGRAM(s) Oral at bedtime  chlorhexidine 4% Liquid 1 Application(s) Topical daily  dextrose 40% Gel 15 Gram(s) Oral once  dextrose 5%. 1000 milliLiter(s) (50 mL/Hr) IV Continuous <Continuous>  dextrose 5%. 1000 milliLiter(s) (100 mL/Hr) IV Continuous <Continuous>  dextrose 50% Injectable 25 Gram(s) IV Push once  dextrose 50% Injectable 12.5 Gram(s) IV Push once  dextrose 50% Injectable 25 Gram(s) IV Push once  furosemide   Injectable 40 milliGRAM(s) IV Push two times a day  glucagon  Injectable 1 milliGRAM(s) IntraMuscular once  heparin  Infusion 1100 Unit(s)/Hr (11 mL/Hr) IV Continuous <Continuous>  insulin glargine Injectable (LANTUS) 15 Unit(s) SubCutaneous at bedtime  insulin lispro (ADMELOG) corrective regimen sliding scale   SubCutaneous three times a day before meals  insulin lispro (ADMELOG) corrective regimen sliding scale   SubCutaneous at bedtime  insulin lispro Injectable (ADMELOG) 5 Unit(s) SubCutaneous three times a day before meals  losartan 25 milliGRAM(s) Oral daily  metoprolol tartrate 12.5 milliGRAM(s) Oral two times a day    MEDICATIONS  (PRN):      PHYSICAL EXAM:  GENERAL: not in acute distress, breathing comfortably on RA  HEAD:  Atraumatic, Normocephalic  EYES: EOMI, PERRLA, conjunctiva and sclera clear  NECK: Supple, No JVD, Normal thyroid, no enlarged nodes  NERVOUS SYSTEM:  A&O x3  CHEST/LUNG: B/L good air entry; No rales, rhonchi, or wheezing  HEART: S1S2 normal, no S3, Regular rate and rhythm; No murmurs  ABDOMEN: Soft, Nontender, Nondistended; Bowel sounds present  EXTREMITIES:  2+ Peripheral Pulses, No clubbing, or cyanosis. minimal pitting edema on ankle b/l  LYMPH: No lymphadenopathy noted  SKIN: No rashes or lesions    Care Discussed with Consultants/Other Providers [ x] YES  [ ] NO

## 2021-03-25 NOTE — DIETITIAN INITIAL EVALUATION ADULT. - PERTINENT LABORATORY DATA
03-25 Na 132 mmol/L<L> Glu 220 mg/dL<H> K+ 5.2 mmol/L Cr 0.52 mg/dL BUN 21 mg/dL Phos 3.0 mg/dL  Hgb 12.9 g/dL Hct 40.1 % Glucose, Serum: 220 mg/dL <H>  Glucose, Serum: 117 mg/dL <H>  HbA1c 13.9&  24Hr FS:192 mg/dL  182 mg/dL  198 mg/dL  110 mg/dL

## 2021-03-25 NOTE — DIETITIAN INITIAL EVALUATION ADULT. - DIET TYPE
1. Recommend DASH/TLC (cholesterol and sodium restricted), Consistent Carbohydrate, Lacto-Ovo Vegetarian diet.

## 2021-03-25 NOTE — DIETITIAN INITIAL EVALUATION ADULT. - RD TO REMAIN AVAILABLE
3. Diet education provided, reinforce as needed. 4. Suggest outpatient follow up with an endocrinologist to ensure long-term DM diet comprehension and compliance.

## 2021-03-25 NOTE — PROGRESS NOTE ADULT - ASSESSMENT
51y/o F w/ h/o T2DM  and Hypertension presents with progressive  worsening shortness of breath, orthopnea and chest pain for 1 wk in the setting of not taking medication admitted for  fluid overload 2/2 acute heart failure and? mild DKA    Cardiac studies:  - TTE 3/24: EF 35-40%, normal mitral valve, minimal MR, normal trileaflet aortic valve, moderate to severe LV systolic dysfunction, apical thrombus seen, normal right ventricular size and function, normal pericardium and trace pericardial effusion, b/l pleural effusions    Neuro:  - A&O x3  - no active issues    Resp:  Acute respiratory failure  - Resolved  - Presented with b/l lung crackles  - Was on BiPAP initially and improved with diuresis. No longer on biPAP  - Will wean off O2 as needed    Cardio:  Acute heart failure  - Presented with SOB, MICHAELS, orthopnea  - proBNP 2170  - s/p lasix gtt 5 mg/hr  - Will continue diuresis with lasix 40 mg IVP BID  - Will continue daily weight, strict I/O's, DASH diet  - TTE 3/24: EF 35-40%, normal mitral valve, minimal MR, normal trileaflet aortic valve, moderate to severe LV systolic dysfunction, apical thrombus seen, normal right ventricular size and function, normal pericardium and trace pericardial effusion, b/l pleural effusions  - Will start on metoprolol tartrate 12.5 mg bid, losartan 25 mg qd, aspirin and statin  - Will plan for [facility] and [facility] possibly on Fri    HTN  - Will start on metoprolol tartrate 12.5 mg bid, losartan 25 mg qd, aspirin and statin  - Will monitor BP  - Will check lipid panel    LV thrombus  - TTE 3/24 noted apical thrombus  - Will continue heparin gtt since possible cath    Renal:  - Will monitor Cr on BMP    GI:  - Will continue DASH, vegetarian diet    Endo:  T2DM  - Takes metformin at home  - Presented with glucose 659, AG 18  - Endo consulted. Recommended to initiate with insulin gtt, replete K as needed, BMP q4  - Ovn, gap closed to 10 but coming up to 15, glucose < 200, bicarb > 18, VBG pH > 7.39  - Currently on lantus 15 u and admelog 5 u premeals and low dose ISS  - Will f/u endo recs    ID:  Leukocytosis likely reactive in the setting of DKA and HF  - Will continue to monitor qd CBC  - Will monitor off abx    Prophylaxis:  - DVT ppx: heparin gtt  - Pregnancy profile negative  - Will put in social work, nutrition, and diabetes education consult   49y/o F w/ h/o T2DM  and Hypertension presents with progressive  worsening shortness of breath, orthopnea and chest pain for 1 wk in the setting of not taking medication admitted for  fluid overload 2/2 acute heart failure and? mild DKA    Cardiac studies:  - TTE 3/24: EF 35-40%, normal mitral valve, minimal MR, normal trileaflet aortic valve, moderate to severe LV systolic dysfunction, apical thrombus seen, normal right ventricular size and function, normal pericardium and trace pericardial effusion, b/l pleural effusions    Neuro:  - A&O x3  - no active issues    Resp:  Acute respiratory failure  - Resolved  - Presented with b/l lung crackles  - Was on BiPAP initially and improved with diuresis. No longer on biPAP  - Will wean off O2 as needed    Cardio:  Acute heart failure  - Presented with SOB, MICHAELS, orthopnea  - proBNP 2170  - s/p lasix gtt 5 mg/hr    - TTE 3/24: EF 35-40%, normal mitral valve, minimal MR, normal trileaflet aortic valve, moderate to severe LV systolic dysfunction, apical thrombus seen, normal right ventricular size and function, normal pericardium and trace pericardial effusion, b/l pleural effusions  - Will continue metoprolol tartrate 12.5 mg bid, losartan 25 mg qd, aspirin and statin  - Will continue daily weight, strict I/O's, DASH diet  - Will hold lasix  - Plan for [facility] on 3/25    HTN  - Will start on metoprolol tartrate 12.5 mg bid, losartan 25 mg qd, aspirin and statin  - Will monitor BP    LV thrombus  - TTE 3/24 noted apical thrombus  - Will continue heparin gtt    Renal:  - Will monitor Cr on BMP    GI:  - Will continue DASH, vegetarian diet    Endo:  T2DM  - Takes metformin at home  - Presented with glucose 659, AG 18  - Endo consulted. Recommended to initiate with insulin gtt, replete K as needed, BMP q4  - Ovn, gap closed to 10 but coming up to 15, glucose < 200, bicarb > 18, VBG pH > 7.39  - Currently on lantus 17 u and admelog 6 u premeals and low dose ISS  - Will f/u endo recs    ID:  Leukocytosis likely reactive in the setting of DKA and HF  - Will continue to monitor qd CBC  - Will monitor off abx    Prophylaxis:  - DVT ppx: heparin gtt  - Pregnancy profile negative  - Will put in social work, nutrition, and diabetes education consult

## 2021-03-25 NOTE — DIETITIAN INITIAL EVALUATION ADULT. - PERTINENT MEDS FT
aspirin enteric coated  atorvastatin  heparin  Infusion  insulin glargine Injectable (LANTUS)  insulin lispro (ADMELOG) corrective regimen sliding scale  insulin lispro Injectable (ADMELOG)  losartan  metoprolol tartrate

## 2021-03-25 NOTE — PROGRESS NOTE ADULT - ASSESSMENT
50 year old woman with PMH HTN, HLD, uncontrolled DM2, p/w worsening shortness of breath and chest pain for 1 week, admitted for management of acute CHF as well as hyperglycemia the 600's. Consult called for management of uncontrolled DM2. A1c is 13.9%. BG goal in the ICU setting is 140-180 mg/dL.

## 2021-03-26 ENCOUNTER — TRANSCRIPTION ENCOUNTER (OUTPATIENT)
Age: 51
End: 2021-03-26

## 2021-03-26 LAB
ALBUMIN SERPL ELPH-MCNC: 3.2 G/DL — LOW (ref 3.3–5)
ALP SERPL-CCNC: 83 U/L — SIGNIFICANT CHANGE UP (ref 40–120)
ALT FLD-CCNC: 21 U/L — SIGNIFICANT CHANGE UP (ref 4–33)
ANION GAP SERPL CALC-SCNC: 12 MMOL/L — SIGNIFICANT CHANGE UP (ref 7–14)
APTT BLD: 48.9 SEC — HIGH (ref 27–36.3)
APTT BLD: 78.5 SEC — HIGH (ref 27–36.3)
AST SERPL-CCNC: 16 U/L — SIGNIFICANT CHANGE UP (ref 4–32)
BASOPHILS # BLD AUTO: 0.05 K/UL — SIGNIFICANT CHANGE UP (ref 0–0.2)
BASOPHILS NFR BLD AUTO: 0.6 % — SIGNIFICANT CHANGE UP (ref 0–2)
BILIRUB SERPL-MCNC: 0.3 MG/DL — SIGNIFICANT CHANGE UP (ref 0.2–1.2)
BUN SERPL-MCNC: 17 MG/DL — SIGNIFICANT CHANGE UP (ref 7–23)
CALCIUM SERPL-MCNC: 9.4 MG/DL — SIGNIFICANT CHANGE UP (ref 8.4–10.5)
CHLORIDE SERPL-SCNC: 102 MMOL/L — SIGNIFICANT CHANGE UP (ref 98–107)
CO2 SERPL-SCNC: 23 MMOL/L — SIGNIFICANT CHANGE UP (ref 22–31)
CREAT SERPL-MCNC: 0.54 MG/DL — SIGNIFICANT CHANGE UP (ref 0.5–1.3)
EOSINOPHIL # BLD AUTO: 0.13 K/UL — SIGNIFICANT CHANGE UP (ref 0–0.5)
EOSINOPHIL NFR BLD AUTO: 1.6 % — SIGNIFICANT CHANGE UP (ref 0–6)
GLUCOSE BLDC GLUCOMTR-MCNC: 128 MG/DL — HIGH (ref 70–99)
GLUCOSE BLDC GLUCOMTR-MCNC: 156 MG/DL — HIGH (ref 70–99)
GLUCOSE BLDC GLUCOMTR-MCNC: 217 MG/DL — HIGH (ref 70–99)
GLUCOSE BLDC GLUCOMTR-MCNC: 248 MG/DL — HIGH (ref 70–99)
GLUCOSE SERPL-MCNC: 148 MG/DL — HIGH (ref 70–99)
HCT VFR BLD CALC: 41.1 % — SIGNIFICANT CHANGE UP (ref 34.5–45)
HGB BLD-MCNC: 13.1 G/DL — SIGNIFICANT CHANGE UP (ref 11.5–15.5)
IANC: 4.61 K/UL — SIGNIFICANT CHANGE UP (ref 1.5–8.5)
IMM GRANULOCYTES NFR BLD AUTO: 0.4 % — SIGNIFICANT CHANGE UP (ref 0–1.5)
LYMPHOCYTES # BLD AUTO: 2.63 K/UL — SIGNIFICANT CHANGE UP (ref 1–3.3)
LYMPHOCYTES # BLD AUTO: 31.9 % — SIGNIFICANT CHANGE UP (ref 13–44)
MAGNESIUM SERPL-MCNC: 1.9 MG/DL — SIGNIFICANT CHANGE UP (ref 1.6–2.6)
MCHC RBC-ENTMCNC: 25.2 PG — LOW (ref 27–34)
MCHC RBC-ENTMCNC: 31.9 GM/DL — LOW (ref 32–36)
MCV RBC AUTO: 79.2 FL — LOW (ref 80–100)
MONOCYTES # BLD AUTO: 0.8 K/UL — SIGNIFICANT CHANGE UP (ref 0–0.9)
MONOCYTES NFR BLD AUTO: 9.7 % — SIGNIFICANT CHANGE UP (ref 2–14)
NEUTROPHILS # BLD AUTO: 4.61 K/UL — SIGNIFICANT CHANGE UP (ref 1.8–7.4)
NEUTROPHILS NFR BLD AUTO: 55.8 % — SIGNIFICANT CHANGE UP (ref 43–77)
NRBC # BLD: 0 /100 WBCS — SIGNIFICANT CHANGE UP
NRBC # FLD: 0 K/UL — SIGNIFICANT CHANGE UP
PHOSPHATE SERPL-MCNC: 4.7 MG/DL — HIGH (ref 2.5–4.5)
PLATELET # BLD AUTO: 336 K/UL — SIGNIFICANT CHANGE UP (ref 150–400)
POTASSIUM SERPL-MCNC: 3.9 MMOL/L — SIGNIFICANT CHANGE UP (ref 3.5–5.3)
POTASSIUM SERPL-SCNC: 3.9 MMOL/L — SIGNIFICANT CHANGE UP (ref 3.5–5.3)
PROT SERPL-MCNC: 6.3 G/DL — SIGNIFICANT CHANGE UP (ref 6–8.3)
RBC # BLD: 5.19 M/UL — SIGNIFICANT CHANGE UP (ref 3.8–5.2)
RBC # FLD: 13.8 % — SIGNIFICANT CHANGE UP (ref 10.3–14.5)
SODIUM SERPL-SCNC: 137 MMOL/L — SIGNIFICANT CHANGE UP (ref 135–145)
WBC # BLD: 8.25 K/UL — SIGNIFICANT CHANGE UP (ref 3.8–10.5)
WBC # FLD AUTO: 8.25 K/UL — SIGNIFICANT CHANGE UP (ref 3.8–10.5)

## 2021-03-26 PROCEDURE — 99232 SBSQ HOSP IP/OBS MODERATE 35: CPT

## 2021-03-26 RX ORDER — MAGNESIUM SULFATE 500 MG/ML
1 VIAL (ML) INJECTION ONCE
Refills: 0 | Status: COMPLETED | OUTPATIENT
Start: 2021-03-26 | End: 2021-03-26

## 2021-03-26 RX ORDER — INSULIN LISPRO 100/ML
7 VIAL (ML) SUBCUTANEOUS
Qty: 10 | Refills: 0
Start: 2021-03-26 | End: 2021-04-24

## 2021-03-26 RX ORDER — POTASSIUM CHLORIDE 20 MEQ
20 PACKET (EA) ORAL ONCE
Refills: 0 | Status: COMPLETED | OUTPATIENT
Start: 2021-03-26 | End: 2021-03-26

## 2021-03-26 RX ORDER — EMPAGLIFLOZIN 10 MG/1
1 TABLET, FILM COATED ORAL
Qty: 30 | Refills: 0
Start: 2021-03-26 | End: 2021-04-24

## 2021-03-26 RX ORDER — ENOXAPARIN SODIUM 100 MG/ML
17 INJECTION SUBCUTANEOUS
Qty: 3 | Refills: 0
Start: 2021-03-26 | End: 2021-04-24

## 2021-03-26 RX ADMIN — CHLORHEXIDINE GLUCONATE 1 APPLICATION(S): 213 SOLUTION TOPICAL at 11:53

## 2021-03-26 RX ADMIN — Medication 81 MILLIGRAM(S): at 11:51

## 2021-03-26 RX ADMIN — Medication 12.5 MILLIGRAM(S): at 16:56

## 2021-03-26 RX ADMIN — INSULIN GLARGINE 17 UNIT(S): 100 INJECTION, SOLUTION SUBCUTANEOUS at 23:29

## 2021-03-26 RX ADMIN — Medication 2: at 16:58

## 2021-03-26 RX ADMIN — LOSARTAN POTASSIUM 25 MILLIGRAM(S): 100 TABLET, FILM COATED ORAL at 05:20

## 2021-03-26 RX ADMIN — HEPARIN SODIUM 10 UNIT(S)/HR: 5000 INJECTION INTRAVENOUS; SUBCUTANEOUS at 08:11

## 2021-03-26 RX ADMIN — Medication 100 GRAM(S): at 06:30

## 2021-03-26 RX ADMIN — Medication 1: at 11:52

## 2021-03-26 RX ADMIN — Medication 6 UNIT(S): at 08:10

## 2021-03-26 RX ADMIN — Medication 12.5 MILLIGRAM(S): at 05:21

## 2021-03-26 RX ADMIN — HEPARIN SODIUM 8 UNIT(S)/HR: 5000 INJECTION INTRAVENOUS; SUBCUTANEOUS at 00:14

## 2021-03-26 RX ADMIN — Medication 20 MILLIEQUIVALENT(S): at 06:30

## 2021-03-26 RX ADMIN — Medication 6 UNIT(S): at 16:56

## 2021-03-26 RX ADMIN — Medication 40 MILLIGRAM(S): at 11:51

## 2021-03-26 RX ADMIN — ATORVASTATIN CALCIUM 20 MILLIGRAM(S): 80 TABLET, FILM COATED ORAL at 21:43

## 2021-03-26 RX ADMIN — HEPARIN SODIUM 9 UNIT(S)/HR: 5000 INJECTION INTRAVENOUS; SUBCUTANEOUS at 05:54

## 2021-03-26 RX ADMIN — Medication 6 UNIT(S): at 11:51

## 2021-03-26 NOTE — PROGRESS NOTE ADULT - SUBJECTIVE AND OBJECTIVE BOX
Chief Complaint: follow up DM2    History:  Patient seen at bedside this morning, tolerating po, had breakfast. SOB improved, no nausea, vomiting, abdominal pain. She states that she only had a little of her dinner last night, thus BG dropped to the 80's at bedtime.     MEDICATIONS  (STANDING):  aspirin enteric coated 81 milliGRAM(s) Oral daily  atorvastatin 20 milliGRAM(s) Oral at bedtime  chlorhexidine 4% Liquid 1 Application(s) Topical daily  dextrose 40% Gel 15 Gram(s) Oral once  dextrose 5%. 1000 milliLiter(s) (50 mL/Hr) IV Continuous <Continuous>  dextrose 5%. 1000 milliLiter(s) (100 mL/Hr) IV Continuous <Continuous>  dextrose 50% Injectable 25 Gram(s) IV Push once  dextrose 50% Injectable 12.5 Gram(s) IV Push once  dextrose 50% Injectable 25 Gram(s) IV Push once  glucagon  Injectable 1 milliGRAM(s) IntraMuscular once  heparin  Infusion 800 Unit(s)/Hr (10 mL/Hr) IV Continuous <Continuous>  insulin glargine Injectable (LANTUS) 17 Unit(s) SubCutaneous at bedtime  insulin lispro (ADMELOG) corrective regimen sliding scale   SubCutaneous three times a day before meals  insulin lispro (ADMELOG) corrective regimen sliding scale   SubCutaneous at bedtime  insulin lispro Injectable (ADMELOG) 6 Unit(s) SubCutaneous three times a day before meals  losartan 25 milliGRAM(s) Oral daily  metoprolol tartrate 12.5 milliGRAM(s) Oral two times a day  torsemide 40 milliGRAM(s) Oral daily    MEDICATIONS  (PRN):    Allergies  No Known Allergies    Review of Systems:  ALL OTHER SYSTEMS REVIEWED AND NEGATIVE    PHYSICAL EXAM:  VITALS: T(C): 36.9 (03-26-21 @ 12:00)  T(F): 98.5 (03-26-21 @ 12:00), Max: 98.7 (03-26-21 @ 00:00)  HR: 105 (03-26-21 @ 12:00) (75 - 111)  BP: 109/68 (03-26-21 @ 11:00) (77/57 - 141/107)  RR:  (12 - 31)  SpO2:  (94% - 100%)  Wt(kg): --  GENERAL: NAD, well-developed  EYES: No proptosis, anicteric  HEENT:  Atraumatic, Normocephalic  RESPIRATORY: + air movement bilaterally, no respiratory distress   PSYCH: Alert and oriented x 3, reactive affect, euthymic mood     POCT Blood Glucose.: 156 mg/dL (03-26-21 @ 11:49)  POCT Blood Glucose.: 128 mg/dL (03-26-21 @ 08:04)  POCT Blood Glucose.: 88 mg/dL (03-25-21 @ 22:08)  POCT Blood Glucose.: 177 mg/dL (03-25-21 @ 18:10)  POCT Blood Glucose.: 190 mg/dL (03-25-21 @ 16:08)  POCT Blood Glucose.: 192 mg/dL (03-25-21 @ 11:29)  POCT Blood Glucose.: 182 mg/dL (03-25-21 @ 07:44)  POCT Blood Glucose.: 198 mg/dL (03-24-21 @ 23:12)  POCT Blood Glucose.: 110 mg/dL (03-24-21 @ 16:49)  POCT Blood Glucose.: 259 mg/dL (03-24-21 @ 11:22)  POCT Blood Glucose.: 149 mg/dL (03-24-21 @ 07:26)  POCT Blood Glucose.: 154 mg/dL (03-24-21 @ 05:04)  POCT Blood Glucose.: 171 mg/dL (03-24-21 @ 03:01)  POCT Blood Glucose.: 201 mg/dL (03-24-21 @ 02:09)  POCT Blood Glucose.: 194 mg/dL (03-24-21 @ 00:49)  POCT Blood Glucose.: 207 mg/dL (03-23-21 @ 23:44)  POCT Blood Glucose.: 229 mg/dL (03-23-21 @ 22:43)  POCT Blood Glucose.: 241 mg/dL (03-23-21 @ 22:00)  POCT Blood Glucose.: 307 mg/dL (03-23-21 @ 20:58)  POCT Blood Glucose.: 316 mg/dL (03-23-21 @ 20:06)  POCT Blood Glucose.: 278 mg/dL (03-23-21 @ 19:07)  POCT Blood Glucose.: 293 mg/dL (03-23-21 @ 18:03)  POCT Blood Glucose.: 392 mg/dL (03-23-21 @ 17:02)  POCT Blood Glucose.: 548 mg/dL (03-23-21 @ 15:46)  POCT Blood Glucose.: 533 mg/dL (03-23-21 @ 13:43)      03-26    137  |  102  |  17  ----------------------------<  148<H>  3.9   |  23  |  0.54    EGFR if : 128  EGFR if non : 111    Ca    9.4      03-26  Mg     1.9     03-26  Phos  4.7     03-26    TPro  6.3  /  Alb  3.2<L>  /  TBili  0.3  /  DBili  x   /  AST  16  /  ALT  21  /  AlkPhos  83  03-26          Thyroid Function Tests:  03-23 @ 14:38 TSH 1.77 FreeT4 -- T3 -- Anti TPO -- Anti Thyroglobulin Ab -- TSI --

## 2021-03-26 NOTE — DISCHARGE NOTE PROVIDER - NSDCCPCAREPLAN_GEN_ALL_CORE_FT
PRINCIPAL DISCHARGE DIAGNOSIS  Diagnosis: Pulmonary edema cardiac cause  Assessment and Plan of Treatment:        PRINCIPAL DISCHARGE DIAGNOSIS  Diagnosis: Triple vessel coronary artery disease  Assessment and Plan of Treatment: Patient getting tr[facility]red to [facility]

## 2021-03-26 NOTE — CHART NOTE - NSCHARTNOTEFT_GEN_A_CORE
Sent test prescriptions for pt's recommended insulin regimen. Her current insurance does NOT cover lantus and jardiance. Instead, covers semglee (long acting insulin) and steglatro (but not available in [facility]).

## 2021-03-26 NOTE — PROGRESS NOTE ADULT - SUBJECTIVE AND OBJECTIVE BOX
Patient is a 50y old  Female who presents with a chief complaint of SOB (25 Mar 2021 13:21)      INTERVAL HPI/OVERNIGHT EVENTS:   No overnight events   Afebrile, hemodynamically stable     ICU Vital Signs Last 24 Hrs  T(C): 36.7 (26 Mar 2021 04:00), Max: 37.1 (26 Mar 2021 00:00)  T(F): 98 (26 Mar 2021 04:00), Max: 98.7 (26 Mar 2021 00:00)  HR: 89 (26 Mar 2021 07:52) (75 - 108)  BP: 83/59 (26 Mar 2021 07:01) (77/57 - 141/107)  BP(mean): 68 (26 Mar 2021 07:01) (63 - 117)  ABP: --  ABP(mean): --  RR: 20 (26 Mar 2021 07:52) (12 - 29)  SpO2: 100% (26 Mar 2021 07:52) (94% - 100%)    I&O's Summary    25 Mar 2021 07:01  -  26 Mar 2021 07:00  --------------------------------------------------------  IN: 550 mL / OUT: 1920 mL / NET: -1370 mL          Daily     Daily Weight in k.8 (26 Mar 2021 00:00)    LABS:                        13.1   8.25  )-----------( 336      ( 26 Mar 2021 05:29 )             41.1         137  |  102  |  17  ----------------------------<  148<H>  3.9   |  23  |  0.54    Ca    9.4      26 Mar 2021 05:29  Phos  4.7       Mg     1.9         TPro  6.3  /  Alb  3.2<L>  /  TBili  0.3  /  DBili  x   /  AST  16  /  ALT  21  /  AlkPhos  83  26    PTT - ( 26 Mar 2021 05:29 )  PTT:48.9 sec    CAPILLARY BLOOD GLUCOSE      POCT Blood Glucose.: 128 mg/dL (26 Mar 2021 08:04)  POCT Blood Glucose.: 88 mg/dL (25 Mar 2021 22:08)  POCT Blood Glucose.: 177 mg/dL (25 Mar 2021 18:10)  POCT Blood Glucose.: 190 mg/dL (25 Mar 2021 16:08)  POCT Blood Glucose.: 192 mg/dL (25 Mar 2021 11:29)              RADIOLOGY & ADDITIONAL TESTS:    Consultant(s) Notes Reviewed:  [x ] YES  [ ] NO    MEDICATIONS  (STANDING):  aspirin enteric coated 81 milliGRAM(s) Oral daily  atorvastatin 20 milliGRAM(s) Oral at bedtime  chlorhexidine 4% Liquid 1 Application(s) Topical daily  dextrose 40% Gel 15 Gram(s) Oral once  dextrose 5%. 1000 milliLiter(s) (50 mL/Hr) IV Continuous <Continuous>  dextrose 5%. 1000 milliLiter(s) (100 mL/Hr) IV Continuous <Continuous>  dextrose 50% Injectable 25 Gram(s) IV Push once  dextrose 50% Injectable 12.5 Gram(s) IV Push once  dextrose 50% Injectable 25 Gram(s) IV Push once  glucagon  Injectable 1 milliGRAM(s) IntraMuscular once  heparin  Infusion 800 Unit(s)/Hr (10 mL/Hr) IV Continuous <Continuous>  insulin glargine Injectable (LANTUS) 17 Unit(s) SubCutaneous at bedtime  insulin lispro (ADMELOG) corrective regimen sliding scale   SubCutaneous three times a day before meals  insulin lispro (ADMELOG) corrective regimen sliding scale   SubCutaneous at bedtime  insulin lispro Injectable (ADMELOG) 6 Unit(s) SubCutaneous three times a day before meals  losartan 25 milliGRAM(s) Oral daily  metoprolol tartrate 12.5 milliGRAM(s) Oral two times a day    MEDICATIONS  (PRN):      PHYSICAL EXAM:  GENERAL:   HEAD:  Atraumatic, Normocephalic  EYES: EOMI, PERRLA, conjunctiva and sclera clear  NECK: Supple, No JVD, Normal thyroid, no enlarged nodes  NERVOUS SYSTEM:  Alert & Awake.   CHEST/LUNG: B/L good air entry; No rales, rhonchi, or wheezing  HEART: S1S2 normal, no S3, Regular rate and rhythm; No murmurs  ABDOMEN: Soft, Nontender, Nondistended; Bowel sounds present  EXTREMITIES:  2+ Peripheral Pulses, No clubbing, cyanosis, or edema  LYMPH: No lymphadenopathy noted  SKIN: No rashes or lesions    Care Discussed with Consultants/Other Providers [ x] YES  [ ] NO   Patient is a 50y old  Female who presents with a chief complaint of SOB (25 Mar 2021 13:21)      INTERVAL HPI/OVERNIGHT EVENTS:   No overnight events   Afebrile, hemodynamically stable     ICU Vital Signs Last 24 Hrs  T(C): 36.7 (26 Mar 2021 04:00), Max: 37.1 (26 Mar 2021 00:00)  T(F): 98 (26 Mar 2021 04:00), Max: 98.7 (26 Mar 2021 00:00)  HR: 89 (26 Mar 2021 07:52) (75 - 108)  BP: 83/59 (26 Mar 2021 07:01) (77/57 - 141/107)  BP(mean): 68 (26 Mar 2021 07:01) (63 - 117)  ABP: --  ABP(mean): --  RR: 20 (26 Mar 2021 07:52) (12 - 29)  SpO2: 100% (26 Mar 2021 07:52) (94% - 100%)    I&O's Summary    25 Mar 2021 07:01  -  26 Mar 2021 07:00  --------------------------------------------------------  IN: 550 mL / OUT: 1920 mL / NET: -1370 mL          Daily     Daily Weight in k.8 (26 Mar 2021 00:00)    LABS:                        13.1   8.25  )-----------( 336      ( 26 Mar 2021 05:29 )             41.1         137  |  102  |  17  ----------------------------<  148<H>  3.9   |  23  |  0.54    Ca    9.4      26 Mar 2021 05:29  Phos  4.7       Mg     1.9         TPro  6.3  /  Alb  3.2<L>  /  TBili  0.3  /  DBili  x   /  AST  16  /  ALT  21  /  AlkPhos  83  26    PTT - ( 26 Mar 2021 05:29 )  PTT:48.9 sec    CAPILLARY BLOOD GLUCOSE      POCT Blood Glucose.: 128 mg/dL (26 Mar 2021 08:04)  POCT Blood Glucose.: 88 mg/dL (25 Mar 2021 22:08)  POCT Blood Glucose.: 177 mg/dL (25 Mar 2021 18:10)  POCT Blood Glucose.: 190 mg/dL (25 Mar 2021 16:08)  POCT Blood Glucose.: 192 mg/dL (25 Mar 2021 11:29)              RADIOLOGY & ADDITIONAL TESTS:    Consultant(s) Notes Reviewed:  [x ] YES  [ ] NO    MEDICATIONS  (STANDING):  aspirin enteric coated 81 milliGRAM(s) Oral daily  atorvastatin 20 milliGRAM(s) Oral at bedtime  chlorhexidine 4% Liquid 1 Application(s) Topical daily  dextrose 40% Gel 15 Gram(s) Oral once  dextrose 5%. 1000 milliLiter(s) (50 mL/Hr) IV Continuous <Continuous>  dextrose 5%. 1000 milliLiter(s) (100 mL/Hr) IV Continuous <Continuous>  dextrose 50% Injectable 25 Gram(s) IV Push once  dextrose 50% Injectable 12.5 Gram(s) IV Push once  dextrose 50% Injectable 25 Gram(s) IV Push once  glucagon  Injectable 1 milliGRAM(s) IntraMuscular once  heparin  Infusion 800 Unit(s)/Hr (10 mL/Hr) IV Continuous <Continuous>  insulin glargine Injectable (LANTUS) 17 Unit(s) SubCutaneous at bedtime  insulin lispro (ADMELOG) corrective regimen sliding scale   SubCutaneous three times a day before meals  insulin lispro (ADMELOG) corrective regimen sliding scale   SubCutaneous at bedtime  insulin lispro Injectable (ADMELOG) 6 Unit(s) SubCutaneous three times a day before meals  losartan 25 milliGRAM(s) Oral daily  metoprolol tartrate 12.5 milliGRAM(s) Oral two times a day    MEDICATIONS  (PRN):      PHYSICAL EXAM:  GENERAL:   HEAD:  Atraumatic, Normocephalic  EYES: EOMI, PERRLA, conjunctiva and sclera clear  NECK: Supple, No JVD, Normal thyroid, no enlarged nodes  NERVOUS SYSTEM:  Alert & Awake.   CHEST/LUNG: B/L good air entry; No rales, rhonchi, or wheezing  HEART: S1S2 normal, no S3, Regular rate and rhythm; No murmurs  ABDOMEN: Soft, Nontender, Nondistended; Bowel sounds present  EXTREMITIES:  2+ Peripheral Pulses, No clubbing, cyanosis, or edema  LYMPH: No lymphadenopathy noted  SKIN: No rashes or lesions    Care Discussed with Consultants/Other Providers [ x] YES  [ ] NO    < from: Cardiac Cath Lab - Adult (21 @ 16:17) >  CORONARY VESSELS: The coronary circulation is right dominant.  LM:   --  Mid left main: There was a discrete 20 % stenosis.  LAD:--  Proximal LAD: There was a tubular 80 % stenosis. There was BASIL  grade 3 flow through the vessel (brisk flow).  --  Mid LAD: There was a diffuse 60 % stenosis. There was BASIL grade 3 flow  through the vessel (brisk flow).  CX:   --  Circumflex: The vessel was small sized.  --  Proximal circumflex: There was a tubular 30 % stenosis.  --  Mid circumflex: There was a diffuse 70 % stenosis.  RCA:   --  Proximal RCA: There was a tubular 75 % stenosis in the distal  third of the vessel segment. There was BASIL grade 3 flow through the  vessel (brisk flow).  --  RPDA: The vessel was small sized. There was a diffuse 70 % stenosis in  the proximal third of the vessel segment. There was BASIL grade 3 flow  through the vessel (brisk flow).  --  RPLS: There was a discrete 60 % stenosis at the ostium of the vessel  segment. There was BASIL grade 3 flow through the vessel (brisk flow). In a  second lesion, there was a discrete 80 % stenosis in the distal third of  the vessel segment, just before RPL1.There was BAISL grade 3 flow through  the vessel (brisk flow).  --  RPL1: The vessel was small sized. Angiography showed severe  atherosclerosis.  COMPLICATIONS: There were no complications.  DIAGNOSTIC RECOMMENDATIONS: Medical management is recommended. Recommend  viability testing to guide CAD management. Recommend diuresis for elevated  LV filling pressure.  Prepared and signed by  Shady Marroquin M.D.    < end of copied text >     Patient is a 50y old  Female who presents with a chief complaint of SOB (25 Mar 2021 13:21)      INTERVAL HPI/OVERNIGHT EVENTS:   No overnight events. SBP staying low in 70-80's this AM.      ICU Vital Signs Last 24 Hrs  T(C): 36.7 (26 Mar 2021 04:00), Max: 37.1 (26 Mar 2021 00:00)  T(F): 98 (26 Mar 2021 04:00), Max: 98.7 (26 Mar 2021 00:00)  HR: 89 (26 Mar 2021 07:52) (75 - 108)  BP: 83/59 (26 Mar 2021 07:01) (77/57 - 141/107)  BP(mean): 68 (26 Mar 2021 07:01) (63 - 117)  RR: 20 (26 Mar 2021 07:52) (12 - 29)  SpO2: 100% (26 Mar 2021 07:52) (94% - 100%)    I&O's Summary    25 Mar 2021 07:01  -  26 Mar 2021 07:00  --------------------------------------------------------  IN: 550 mL / OUT: 1920 mL / NET: -1370 mL          Daily     Daily Weight in k.8 (26 Mar 2021 00:00)    LABS:                        13.1   8.25  )-----------( 336      ( 26 Mar 2021 05:29 )             41.1         137  |  102  |  17  ----------------------------<  148<H>  3.9   |  23  |  0.54    Ca    9.4      26 Mar 2021 05:29  Phos  4.7       Mg     1.9         TPro  6.3  /  Alb  3.2<L>  /  TBili  0.3  /  DBili  x   /  AST  16  /  ALT  21  /  AlkPhos  83  0326    PTT - ( 26 Mar 2021 05:29 )  PTT:48.9 sec    CAPILLARY BLOOD GLUCOSE      POCT Blood Glucose.: 128 mg/dL (26 Mar 2021 08:04)  POCT Blood Glucose.: 88 mg/dL (25 Mar 2021 22:08)  POCT Blood Glucose.: 177 mg/dL (25 Mar 2021 18:10)  POCT Blood Glucose.: 190 mg/dL (25 Mar 2021 16:08)  POCT Blood Glucose.: 192 mg/dL (25 Mar 2021 11:29)              RADIOLOGY & ADDITIONAL TESTS:    Consultant(s) Notes Reviewed:  [x ] YES  [ ] NO    MEDICATIONS  (STANDING):  aspirin enteric coated 81 milliGRAM(s) Oral daily  atorvastatin 20 milliGRAM(s) Oral at bedtime  chlorhexidine 4% Liquid 1 Application(s) Topical daily  dextrose 40% Gel 15 Gram(s) Oral once  dextrose 5%. 1000 milliLiter(s) (50 mL/Hr) IV Continuous <Continuous>  dextrose 5%. 1000 milliLiter(s) (100 mL/Hr) IV Continuous <Continuous>  dextrose 50% Injectable 25 Gram(s) IV Push once  dextrose 50% Injectable 12.5 Gram(s) IV Push once  dextrose 50% Injectable 25 Gram(s) IV Push once  glucagon  Injectable 1 milliGRAM(s) IntraMuscular once  heparin  Infusion 800 Unit(s)/Hr (10 mL/Hr) IV Continuous <Continuous>  insulin glargine Injectable (LANTUS) 17 Unit(s) SubCutaneous at bedtime  insulin lispro (ADMELOG) corrective regimen sliding scale   SubCutaneous three times a day before meals  insulin lispro (ADMELOG) corrective regimen sliding scale   SubCutaneous at bedtime  insulin lispro Injectable (ADMELOG) 6 Unit(s) SubCutaneous three times a day before meals  losartan 25 milliGRAM(s) Oral daily  metoprolol tartrate 12.5 milliGRAM(s) Oral two times a day    MEDICATIONS  (PRN):      PHYSICAL EXAM:  GENERAL:   HEAD:  Atraumatic, Normocephalic  EYES: EOMI, PERRLA, conjunctiva and sclera clear  NECK: Supple, No JVD, Normal thyroid, no enlarged nodes  NERVOUS SYSTEM:  Alert & Awake.   CHEST/LUNG: B/L good air entry; No rales, rhonchi, or wheezing  HEART: S1S2 normal, no S3, Regular rate and rhythm; No murmurs  ABDOMEN: Soft, Nontender, Nondistended; Bowel sounds present  EXTREMITIES:  2+ Peripheral Pulses, No clubbing, cyanosis, or edema  LYMPH: No lymphadenopathy noted  SKIN: No rashes or lesions    Care Discussed with Consultants/Other Providers [ x] YES  [ ] NO    < from: Cardiac Cath Lab - Adult (21 @ 16:17) >  CORONARY VESSELS: The coronary circulation is right dominant.  LM:   --  Mid left main: There was a discrete 20 % stenosis.  LAD:--  Proximal LAD: There was a tubular 80 % stenosis. There was BASIL  grade 3 flow through the vessel (brisk flow).  --  Mid LAD: There was a diffuse 60 % stenosis. There was BASIL grade 3 flow  through the vessel (brisk flow).  CX:   --  Circumflex: The vessel was small sized.  --  Proximal circumflex: There was a tubular 30 % stenosis.  --  Mid circumflex: There was a diffuse 70 % stenosis.  RCA:   --  Proximal RCA: There was a tubular 75 % stenosis in the distal  third of the vessel segment. There was BASIL grade 3 flow through the  vessel (brisk flow).  --  RPDA: The vessel was small sized. There was a diffuse 70 % stenosis in  the proximal third of the vessel segment. There was BASIL grade 3 flow  through the vessel (brisk flow).  --  RPLS: There was a discrete 60 % stenosis at the ostium of the vessel  segment. There was BASIL grade 3 flow through the vessel (brisk flow). In a  second lesion, there was a discrete 80 % stenosis in the distal third of  the vessel segment, just before RPL1.There was BASIL grade 3 flow through  the vessel (brisk flow).  --  RPL1: The vessel was small sized. Angiography showed severe  atherosclerosis.  COMPLICATIONS: There were no complications.  DIAGNOSTIC RECOMMENDATIONS: Medical management is recommended. Recommend  viability testing to guide CAD management. Recommend diuresis for elevated  LV filling pressure.  Prepared and signed by  Shady Marroquin M.D.    < end of copied text >     Patient is a 50y old  Female who presents with a chief complaint of SOB (25 Mar 2021 13:21)      INTERVAL HPI/OVERNIGHT EVENTS:   No overnight events. SBP staying low in 70-80's this AM.  Urinary output -1.4 without diuretic. s/p cath showed triple vessel disease. Denies headache, vision changes, SOB, n/v, abdominal pain, pain or swelling in extremities.    ICU Vital Signs Last 24 Hrs  T(C): 36.7 (26 Mar 2021 04:00), Max: 37.1 (26 Mar 2021 00:00)  T(F): 98 (26 Mar 2021 04:00), Max: 98.7 (26 Mar 2021 00:00)  HR: 89 (26 Mar 2021 07:52) (75 - 108)  BP: 83/59 (26 Mar 2021 07:01) (77/57 - 141/107)  BP(mean): 68 (26 Mar 2021 07:01) (63 - 117)  RR: 20 (26 Mar 2021 07:52) (12 - 29)  SpO2: 100% (26 Mar 2021 07:52) (94% - 100%)    I&O's Summary    25 Mar 2021 07:01  -  26 Mar 2021 07:00  --------------------------------------------------------  IN: 550 mL / OUT: 1920 mL / NET: -1370 mL          Daily     Daily Weight in k.8 (26 Mar 2021 00:00)    LABS:                        13.1   8.25  )-----------( 336      ( 26 Mar 2021 05:29 )             41.1         137  |  102  |  17  ----------------------------<  148<H>  3.9   |  23  |  0.54    Ca    9.4      26 Mar 2021 05:29  Phos  4.7       Mg     1.9         TPro  6.3  /  Alb  3.2<L>  /  TBili  0.3  /  DBili  x   /  AST  16  /  ALT  21  /  AlkPhos  83      PTT - ( 26 Mar 2021 05:29 )  PTT:48.9 sec    CAPILLARY BLOOD GLUCOSE      POCT Blood Glucose.: 128 mg/dL (26 Mar 2021 08:04)  POCT Blood Glucose.: 88 mg/dL (25 Mar 2021 22:08)  POCT Blood Glucose.: 177 mg/dL (25 Mar 2021 18:10)  POCT Blood Glucose.: 190 mg/dL (25 Mar 2021 16:08)  POCT Blood Glucose.: 192 mg/dL (25 Mar 2021 11:29)              RADIOLOGY & ADDITIONAL TESTS:    Consultant(s) Notes Reviewed:  [x ] YES  [ ] NO    MEDICATIONS  (STANDING):  aspirin enteric coated 81 milliGRAM(s) Oral daily  atorvastatin 20 milliGRAM(s) Oral at bedtime  chlorhexidine 4% Liquid 1 Application(s) Topical daily  dextrose 40% Gel 15 Gram(s) Oral once  dextrose 5%. 1000 milliLiter(s) (50 mL/Hr) IV Continuous <Continuous>  dextrose 5%. 1000 milliLiter(s) (100 mL/Hr) IV Continuous <Continuous>  dextrose 50% Injectable 25 Gram(s) IV Push once  dextrose 50% Injectable 12.5 Gram(s) IV Push once  dextrose 50% Injectable 25 Gram(s) IV Push once  glucagon  Injectable 1 milliGRAM(s) IntraMuscular once  heparin  Infusion 800 Unit(s)/Hr (10 mL/Hr) IV Continuous <Continuous>  insulin glargine Injectable (LANTUS) 17 Unit(s) SubCutaneous at bedtime  insulin lispro (ADMELOG) corrective regimen sliding scale   SubCutaneous three times a day before meals  insulin lispro (ADMELOG) corrective regimen sliding scale   SubCutaneous at bedtime  insulin lispro Injectable (ADMELOG) 6 Unit(s) SubCutaneous three times a day before meals  losartan 25 milliGRAM(s) Oral daily  metoprolol tartrate 12.5 milliGRAM(s) Oral two times a day    MEDICATIONS  (PRN):      PHYSICAL EXAM:  GENERAL: not in acute distress, breathing comfortably on RA  HEAD:  Atraumatic, Normocephalic  EYES: EOMI, PERRLA, conjunctiva and sclera clear  NECK: Supple, No JVD, Normal thyroid, no enlarged nodes  NERVOUS SYSTEM:  A&O x3  CHEST/LUNG: B/L good air entry; No rales, rhonchi, or wheezing  HEART: S1S2 normal, no S3, Regular rate and rhythm; No murmurs  ABDOMEN: Soft, Nontender, Nondistended; Bowel sounds present  EXTREMITIES:  2+ Peripheral Pulses, No clubbing, or cyanosis. minimal pitting edema on ankle b/l  LYMPH: No lymphadenopathy noted  SKIN: No rashes or lesions    Care Discussed with Consultants/Other Providers [ x] YES  [ ] NO    < from: Cardiac Cath Lab - Adult (21 @ 16:17) >  CORONARY VESSELS: The coronary circulation is right dominant.  LM:   --  Mid left main: There was a discrete 20 % stenosis.  LAD:--  Proximal LAD: There was a tubular 80 % stenosis. There was BASIL  grade 3 flow through the vessel (brisk flow).  --  Mid LAD: There was a diffuse 60 % stenosis. There was BASIL grade 3 flow  through the vessel (brisk flow).  CX:   --  Circumflex: The vessel was small sized.  --  Proximal circumflex: There was a tubular 30 % stenosis.  --  Mid circumflex: There was a diffuse 70 % stenosis.  RCA:   --  Proximal RCA: There was a tubular 75 % stenosis in the distal  third of the vessel segment. There was BASIL grade 3 flow through the  vessel (brisk flow).  --  RPDA: The vessel was small sized. There was a diffuse 70 % stenosis in  the proximal third of the vessel segment. There was BASIL grade 3 flow  through the vessel (brisk flow).  --  RPLS: There was a discrete 60 % stenosis at the ostium of the vessel  segment. There was BASIL grade 3 flow through the vessel (brisk flow). In a  second lesion, there was a discrete 80 % stenosis in the distal third of  the vessel segment, just before RPL1.There was BASIL grade 3 flow through  the vessel (brisk flow).  --  RPL1: The vessel was small sized. Angiography showed severe  atherosclerosis.  COMPLICATIONS: There were no complications.  DIAGNOSTIC RECOMMENDATIONS: Medical management is recommended. Recommend  viability testing to guide CAD management. Recommend diuresis for elevated  LV filling pressure.  Prepared and signed by  Shady Marroquin M.D.    < end of copied text >

## 2021-03-26 NOTE — DISCHARGE NOTE PROVIDER - HOSPITAL COURSE
49y/o F w/ h/o T2DM (diagnosed 2005) and Hypertension (diagnosed 2005) p/w worsening shortness of breath and chest pain for 1 wk on 3/23. Due to insurance issues, was not able to take home metformin. COVID 19 was tested 2 days before admission. At the ED, pt was found to be in DKA with glucose level in 600s. Endo was consulted and was placed on insulin gtt.     During CCU stay, pt underwent TTE. TTE 3/24 showed EF 35-40%, normal mitral valve, minimal MR, normal trileaflet aortic valve, moderate to severe LV systolic dysfunction, apical thrombus seen, normal right ventricular size and function, normal pericardium and trace pericardial effusion, b/l pleural effusions. Pt was warranted for ischemic evaluation and underwent cath on 3/25. LHC/RHC 3/25 showed PCW 30, CO 3.94, CI 2.42, PLAD 80% stenosis, Mid LAD 60% stenosis, Mid circumflex 70%, RCA 75% stenosis, RPDA 70% stenosis, RPLS 60% stenosis at ostium. 49y/o F w/ h/o T2DM (diagnosed 2005) and Hypertension (diagnosed 2005) p/w worsening shortness of breath and chest pain for 1 wk on 3/23. Due to insurance issues, was not able to take home metformin. COVID 19 was tested 2 days before admission. At the ED, pt was found to be in DKA with glucose level in 600s. Endo was consulted and was placed on insulin gtt.     During CCU stay, pt underwent TTE. TTE 3/24 showed EF 35-40%, normal mitral valve, minimal MR, normal trileaflet aortic valve, moderate to severe LV systolic dysfunction, apical thrombus seen, normal right ventricular size and function, normal pericardium and trace pericardial effusion, b/l pleural effusions. Pt was warranted for ischemic evaluation and underwent cath on 3/25. LHC/RHC 3/25 showed PCW 30, CO 3.94, CI 2.42, PLAD 80% stenosis, Mid LAD 60% stenosis, Mid circumflex 70%, RCA 75% stenosis, RPDA 70% stenosis, RPLS 60% stenosis at ostium. On heparin dripo for LV thrombus    50F with DM2, diagnosed 2005 and HTN presents with worsening SOB and chest pain X one week. On admission found to be in DKA which responded to treatment. ECHO done which revealed new LV dysfunction with + LV thrombus. Subsequent ischemia evaluation revealed severe triple vessel disease and patient is now awaiting completion of viability study to determine CABG vs. high risk PCI vs. medical management.       CAD (coronary artery disease).  Plan: Continue ASA, lipitor, losartan, and metoprolol. Awaiting completion of viability study. Plan.      LV (left ventricular) mural thrombus.  Plan: Continues on Heparin drip at present. Will determine transition to po A/C once decision is made concerning intervention vs. medical management for CAD. Plan.     Acute heart failure, unspecified heart failure type.  Plan: Euvolemic at present, continue torsemide. HF followup as outpatient. Plan.      T2DM (type 2 diabetes mellitus).  Plan: Better controlled at present. Continue diet, FS, Lantus, pre meal Humalog, and IHSS. Endocrine covering. Plan.     The patient is a 50-year-old woman with diabetes and hypertension who presented with worsening dyspnea and chest pain, found to be in DKA on admission. Echocardiography demonstrated new LV dysfunction with LV thrombus. Coronary angiography demonstrates severe 3-vessel coronary disease. Given her age, the extent of her coronary disease, and her low LVEF, revascularization by CABG may be considered. Nuclear study demonstrates myocardial viability in all territories. It is unclear on my review of the films, however, whether there are appropriate targets for grafting.     CV surgery will review the films. If the patient is felt to be a surgical candidate, she will require transfer to [facility]. If she is turned down for CABG, PCI will be planned on the proximal LAD and the proximal RCA. The distal vessels and the large OM do not appear appropriate for PCI at this time.     Getting transferred to [facility], 3/29/21 @2045. Explained to the patient and Transfer consent is in the chart

## 2021-03-26 NOTE — DISCHARGE NOTE PROVIDER - CARE PROVIDER_API CALL
Jeremi Baig)  Surgery; Thoracic and Cardiac Surgery  [address] [place] [address]  Phone: [phone]  Fax: [phone]  Follow Up Time:

## 2021-03-26 NOTE — CHART NOTE - [id number]_GEN_A_CORE
CCU Transfer Note  ---------------------------    Transfer from: MICU  Transfer to:  (  ) Medicine    (x) Telemetry    (  ) RCU    (  ) Palliative    (  ) Stroke Unit    (  ) _______________  Accepting Physician:      CCU COURSE    51y/o F w/ h/o T2DM (diagnosed 2005) and Hypertension (diagnosed 2005) p/w worsening shortness of breath and chest pain for 1 wk on 3/23. Due to insurance issues, was not able to take home metformin. COVID 19 was tested 2 days before admission. At the ED, pt was found to be in DKA with glucose level in 600s. Endo was consulted and was placed on insulin gtt.     During CCU stay, pt underwent TTE. TTE 3/24 showed EF 35-40%, normal mitral valve, minimal MR, normal trileaflet aortic valve, moderate to severe LV systolic dysfunction, apical thrombus seen, normal right ventricular size and function, normal pericardium and trace pericardial effusion, b/l pleural effusions. Pt was warranted for ischemic evaluation and underwent cath on 3/25. LHC/RHC 3/25 showed PCW 30, CO 3.94, CI 2.42, PLAD 80% stenosis, Mid LAD 60% stenosis, Mid circumflex 70%, RCA 75% stenosis, RPDA 70% stenosis, RPLS 60% stenosis at ostium.     OBJECTIVE --  Vital Signs Last 24 Hrs  T(C): 36.9 (26 Mar 2021 12:00), Max: 37.1 (26 Mar 2021 00:00)  T(F): 98.5 (26 Mar 2021 12:00), Max: 98.7 (26 Mar 2021 00:00)  HR: 105 (26 Mar 2021 12:00) (75 - 111)  BP: 109/68 (26 Mar 2021 11:00) (77/57 - 141/107)  BP(mean): 81 (26 Mar 2021 11:00) (65 - 117)  RR: 21 (26 Mar 2021 12:00) (12 - 31)  SpO2: 96% (26 Mar 2021 12:00) (94% - 100%)    I&O's Summary    25 Mar 2021 07:01  -  26 Mar 2021 07:00  --------------------------------------------------------  IN: 600 mL / OUT: 1920 mL / NET: -1320 mL    26 Mar 2021 07:01  -  26 Mar 2021 12:16  --------------------------------------------------------  IN: 400 mL / OUT: 300 mL / NET: 100 mL        MEDICATIONS  (STANDING):  aspirin enteric coated 81 milliGRAM(s) Oral daily  atorvastatin 20 milliGRAM(s) Oral at bedtime  chlorhexidine 4% Liquid 1 Application(s) Topical daily  dextrose 40% Gel 15 Gram(s) Oral once  dextrose 5%. 1000 milliLiter(s) (50 mL/Hr) IV Continuous <Continuous>  dextrose 5%. 1000 milliLiter(s) (100 mL/Hr) IV Continuous <Continuous>  dextrose 50% Injectable 25 Gram(s) IV Push once  dextrose 50% Injectable 12.5 Gram(s) IV Push once  dextrose 50% Injectable 25 Gram(s) IV Push once  glucagon  Injectable 1 milliGRAM(s) IntraMuscular once  heparin  Infusion 800 Unit(s)/Hr (10 mL/Hr) IV Continuous <Continuous>  insulin glargine Injectable (LANTUS) 17 Unit(s) SubCutaneous at bedtime  insulin lispro (ADMELOG) corrective regimen sliding scale   SubCutaneous three times a day before meals  insulin lispro (ADMELOG) corrective regimen sliding scale   SubCutaneous at bedtime  insulin lispro Injectable (ADMELOG) 6 Unit(s) SubCutaneous three times a day before meals  losartan 25 milliGRAM(s) Oral daily  metoprolol tartrate 12.5 milliGRAM(s) Oral two times a day  torsemide 40 milliGRAM(s) Oral daily    MEDICATIONS  (PRN):        LABS                                            13.1                  Neurophils% (auto):   55.8   (03-26 @ 05:29):    8.25 )-----------(336          Lymphocytes% (auto):  31.9                                          41.1                   Eosinphils% (auto):   1.6      Manual%: Neutrophils x    ; Lymphocytes x    ; Eosinophils x    ; Bands%: x    ; Blasts x                                    137    |  102    |  17                  Calcium: 9.4   / iCa: x      (03-26 @ 05:29)    ----------------------------<  148       Magnesium: 1.9                              3.9     |  23     |  0.54             Phosphorous: 4.7      TPro  6.3    /  Alb  3.2    /  TBili  0.3    /  DBili  x      /  AST  16     /  ALT  21     /  AlkPhos  83     26 Mar 2021 05:29    ( 03-26 @ 05:29 )   PT: x    ;   INR: x      aPTT: 48.9 sec          ASSESSMENT & PLAN:   51y/o F w/ h/o T2DM  and Hypertension presents with progressive  worsening shortness of breath, orthopnea and chest pain for 1 wk in the setting of not taking medication admitted for fluid overload 2/2 new onset acute heart failure and mild DKA.      For Follow-Up:  [ ] f/u endo recs. per case management, insurance is MEDICARE  [ ] f/u for home regimen insulin   [ ] plan for viability test on 3/28 - 3/29  [ ] when time for discharge comes, dc meds should be sent to [facility] and sent home with pts CCU Transfer Note  ---------------------------    Transfer from: MICU  Transfer to:  (  ) Medicine    (x) Telemetry    (  ) RCU    (  ) Palliative    (  ) Stroke Unit    (  ) _______________  Accepting Physician:      CCU COURSE    51y/o F w/ h/o T2DM (diagnosed 2005) and Hypertension (diagnosed 2005) p/w worsening shortness of breath and chest pain for 1 wk on 3/23. Due to insurance issues, was not able to take home metformin. COVID 19 was tested 2 days before admission. At the ED, pt was found to be in DKA with glucose level in 600s. Endo was consulted and was placed on insulin gtt.     During CCU stay, pt underwent TTE. TTE 3/24 showed EF 35-40%, normal mitral valve, minimal MR, normal trileaflet aortic valve, moderate to severe LV systolic dysfunction, apical thrombus seen, normal right ventricular size and function, normal pericardium and trace pericardial effusion, b/l pleural effusions. Pt was warranted for ischemic evaluation and underwent cath on 3/25. LHC/RHC 3/25 showed PCW 30, CO 3.94, CI 2.42, PLAD 80% stenosis, Mid LAD 60% stenosis, Mid circumflex 70%, RCA 75% stenosis, RPDA 70% stenosis, RPLS 60% stenosis at ostium.     OBJECTIVE --  Vital Signs Last 24 Hrs  T(C): 36.9 (26 Mar 2021 12:00), Max: 37.1 (26 Mar 2021 00:00)  T(F): 98.5 (26 Mar 2021 12:00), Max: 98.7 (26 Mar 2021 00:00)  HR: 105 (26 Mar 2021 12:00) (75 - 111)  BP: 109/68 (26 Mar 2021 11:00) (77/57 - 141/107)  BP(mean): 81 (26 Mar 2021 11:00) (65 - 117)  RR: 21 (26 Mar 2021 12:00) (12 - 31)  SpO2: 96% (26 Mar 2021 12:00) (94% - 100%)    I&O's Summary    25 Mar 2021 07:01  -  26 Mar 2021 07:00  --------------------------------------------------------  IN: 600 mL / OUT: 1920 mL / NET: -1320 mL    26 Mar 2021 07:01  -  26 Mar 2021 12:16  --------------------------------------------------------  IN: 400 mL / OUT: 300 mL / NET: 100 mL        MEDICATIONS  (STANDING):  aspirin enteric coated 81 milliGRAM(s) Oral daily  atorvastatin 20 milliGRAM(s) Oral at bedtime  chlorhexidine 4% Liquid 1 Application(s) Topical daily  dextrose 40% Gel 15 Gram(s) Oral once  dextrose 5%. 1000 milliLiter(s) (50 mL/Hr) IV Continuous <Continuous>  dextrose 5%. 1000 milliLiter(s) (100 mL/Hr) IV Continuous <Continuous>  dextrose 50% Injectable 25 Gram(s) IV Push once  dextrose 50% Injectable 12.5 Gram(s) IV Push once  dextrose 50% Injectable 25 Gram(s) IV Push once  glucagon  Injectable 1 milliGRAM(s) IntraMuscular once  heparin  Infusion 800 Unit(s)/Hr (10 mL/Hr) IV Continuous <Continuous>  insulin glargine Injectable (LANTUS) 17 Unit(s) SubCutaneous at bedtime  insulin lispro (ADMELOG) corrective regimen sliding scale   SubCutaneous three times a day before meals  insulin lispro (ADMELOG) corrective regimen sliding scale   SubCutaneous at bedtime  insulin lispro Injectable (ADMELOG) 6 Unit(s) SubCutaneous three times a day before meals  losartan 25 milliGRAM(s) Oral daily  metoprolol tartrate 12.5 milliGRAM(s) Oral two times a day  torsemide 40 milliGRAM(s) Oral daily    MEDICATIONS  (PRN):        LABS                                            13.1                  Neurophils% (auto):   55.8   (03-26 @ 05:29):    8.25 )-----------(336          Lymphocytes% (auto):  31.9                                          41.1                   Eosinphils% (auto):   1.6      Manual%: Neutrophils x    ; Lymphocytes x    ; Eosinophils x    ; Bands%: x    ; Blasts x                                    137    |  102    |  17                  Calcium: 9.4   / iCa: x      (03-26 @ 05:29)    ----------------------------<  148       Magnesium: 1.9                              3.9     |  23     |  0.54             Phosphorous: 4.7      TPro  6.3    /  Alb  3.2    /  TBili  0.3    /  DBili  x      /  AST  16     /  ALT  21     /  AlkPhos  83     26 Mar 2021 05:29    ( 03-26 @ 05:29 )   PT: x    ;   INR: x      aPTT: 48.9 sec          ASSESSMENT & PLAN:   51y/o F w/ h/o T2DM  and Hypertension presents with progressive  worsening shortness of breath, orthopnea and chest pain for 1 wk in the setting of not taking medication admitted for fluid overload 2/2 new onset acute heart failure and mild DKA.      For Follow-Up:  [ ] f/u endo recs. per case management, insurance is MEDICARE  [ ] f/u for home regimen insulin   [ ] plan for viability test on 3/28 - 3/29  [ ] possible cath early next week. f/u ccu/cardiology recs  [ ] when time for discharge comes, dc meds should be sent to [facility] and sent home with pts CCU Transfer Note  ---------------------------    Transfer from: MICU  Transfer to:  (  ) Medicine    (x) Telemetry  7N 721B  (  ) RCU    (  ) Palliative    (  ) Stroke Unit    (  ) _______________  Accepting Physician:      CCU COURSE    51y/o F w/ h/o T2DM (diagnosed 2005) and Hypertension (diagnosed 2005) p/w worsening shortness of breath and chest pain for 1 wk on 3/23. Due to insurance issues, was not able to take home metformin. COVID 19 was tested 2 days before admission. At the ED, pt was found to be in DKA with glucose level in 600s. Endo was consulted and was placed on insulin gtt.     During CCU stay, pt underwent TTE. TTE 3/24 showed EF 35-40%, normal mitral valve, minimal MR, normal trileaflet aortic valve, moderate to severe LV systolic dysfunction, apical thrombus seen, normal right ventricular size and function, normal pericardium and trace pericardial effusion, b/l pleural effusions. Pt was warranted for ischemic evaluation and underwent cath on 3/25. LHC/RHC 3/25 showed PCW 30, CO 3.94, CI 2.42, PLAD 80% stenosis, Mid LAD 60% stenosis, Mid circumflex 70%, RCA 75% stenosis, RPDA 70% stenosis, RPLS 60% stenosis at ostium.     OBJECTIVE --  Vital Signs Last 24 Hrs  T(C): 36.9 (26 Mar 2021 12:00), Max: 37.1 (26 Mar 2021 00:00)  T(F): 98.5 (26 Mar 2021 12:00), Max: 98.7 (26 Mar 2021 00:00)  HR: 105 (26 Mar 2021 12:00) (75 - 111)  BP: 109/68 (26 Mar 2021 11:00) (77/57 - 141/107)  BP(mean): 81 (26 Mar 2021 11:00) (65 - 117)  RR: 21 (26 Mar 2021 12:00) (12 - 31)  SpO2: 96% (26 Mar 2021 12:00) (94% - 100%)    I&O's Summary    25 Mar 2021 07:01  -  26 Mar 2021 07:00  --------------------------------------------------------  IN: 600 mL / OUT: 1920 mL / NET: -1320 mL    26 Mar 2021 07:01  -  26 Mar 2021 12:16  --------------------------------------------------------  IN: 400 mL / OUT: 300 mL / NET: 100 mL        MEDICATIONS  (STANDING):  aspirin enteric coated 81 milliGRAM(s) Oral daily  atorvastatin 20 milliGRAM(s) Oral at bedtime  chlorhexidine 4% Liquid 1 Application(s) Topical daily  dextrose 40% Gel 15 Gram(s) Oral once  dextrose 5%. 1000 milliLiter(s) (50 mL/Hr) IV Continuous <Continuous>  dextrose 5%. 1000 milliLiter(s) (100 mL/Hr) IV Continuous <Continuous>  dextrose 50% Injectable 25 Gram(s) IV Push once  dextrose 50% Injectable 12.5 Gram(s) IV Push once  dextrose 50% Injectable 25 Gram(s) IV Push once  glucagon  Injectable 1 milliGRAM(s) IntraMuscular once  heparin  Infusion 800 Unit(s)/Hr (10 mL/Hr) IV Continuous <Continuous>  insulin glargine Injectable (LANTUS) 17 Unit(s) SubCutaneous at bedtime  insulin lispro (ADMELOG) corrective regimen sliding scale   SubCutaneous three times a day before meals  insulin lispro (ADMELOG) corrective regimen sliding scale   SubCutaneous at bedtime  insulin lispro Injectable (ADMELOG) 6 Unit(s) SubCutaneous three times a day before meals  losartan 25 milliGRAM(s) Oral daily  metoprolol tartrate 12.5 milliGRAM(s) Oral two times a day  torsemide 40 milliGRAM(s) Oral daily    MEDICATIONS  (PRN):        LABS                                            13.1                  Neurophils% (auto):   55.8   (03-26 @ 05:29):    8.25 )-----------(336          Lymphocytes% (auto):  31.9                                          41.1                   Eosinphils% (auto):   1.6      Manual%: Neutrophils x    ; Lymphocytes x    ; Eosinophils x    ; Bands%: x    ; Blasts x                                    137    |  102    |  17                  Calcium: 9.4   / iCa: x      (03-26 @ 05:29)    ----------------------------<  148       Magnesium: 1.9                              3.9     |  23     |  0.54             Phosphorous: 4.7      TPro  6.3    /  Alb  3.2    /  TBili  0.3    /  DBili  x      /  AST  16     /  ALT  21     /  AlkPhos  83     26 Mar 2021 05:29    ( 03-26 @ 05:29 )   PT: x    ;   INR: x      aPTT: 48.9 sec          ASSESSMENT & PLAN:   51y/o F w/ h/o T2DM  and Hypertension presents with progressive  worsening shortness of breath, orthopnea and chest pain for 1 wk in the setting of not taking medication admitted for fluid overload 2/2 new onset acute heart failure and mild DKA.      For Follow-Up:  [ ] f/u endo recs. per case management, insurance is MEDICARE  [ ] f/u for home regimen insulin   [ ] plan for viability test on 3/28 - 3/29  [ ] possible cath early next week. f/u ccu/cardiology recs  [ ] when time for discharge comes, dc meds should be sent to [facility] and sent home with pts CCU Transfer Note  ---------------------------    Transfer from: MICU  Transfer to:  (  ) Medicine    (x) Telemetry  7N 721B  (  ) RCU    (  ) Palliative    (  ) Stroke Unit     Accepting Physician:      CCU COURSE    49y/o F w/ h/o T2DM (diagnosed 2005) and Hypertension (diagnosed 2005) p/w worsening shortness of breath and chest pain for 1 wk on 3/23. Due to insurance issues, was not able to take home metformin. COVID 19 was tested 2 days before admission. At the ED, pt was found to be in DKA with glucose level in 600s. Endo was consulted and was placed on insulin gtt.     During CCU stay, pt underwent TTE. TTE 3/24 showed EF 35-40%, normal mitral valve, minimal MR, normal trileaflet aortic valve, moderate to severe LV systolic dysfunction, apical thrombus seen, normal right ventricular size and function, normal pericardium and trace pericardial effusion, b/l pleural effusions. Pt was warranted for ischemic evaluation and underwent cath on 3/25. LHC/RHC 3/25 showed PCW 30, CO 3.94, CI 2.42, PLAD 80% stenosis, Mid LAD 60% stenosis, Mid circumflex 70%, RCA 75% stenosis, RPDA 70% stenosis, RPLS 60% stenosis at ostium.     OBJECTIVE --  Vital Signs Last 24 Hrs  T(C): 36.9 (26 Mar 2021 12:00), Max: 37.1 (26 Mar 2021 00:00)  T(F): 98.5 (26 Mar 2021 12:00), Max: 98.7 (26 Mar 2021 00:00)  HR: 105 (26 Mar 2021 12:00) (75 - 111)  BP: 109/68 (26 Mar 2021 11:00) (77/57 - 141/107)  BP(mean): 81 (26 Mar 2021 11:00) (65 - 117)  RR: 21 (26 Mar 2021 12:00) (12 - 31)  SpO2: 96% (26 Mar 2021 12:00) (94% - 100%)    I&O's Summary    25 Mar 2021 07:01  -  26 Mar 2021 07:00  --------------------------------------------------------  IN: 600 mL / OUT: 1920 mL / NET: -1320 mL    26 Mar 2021 07:01  -  26 Mar 2021 12:16  --------------------------------------------------------  IN: 400 mL / OUT: 300 mL / NET: 100 mL        MEDICATIONS  (STANDING):  aspirin enteric coated 81 milliGRAM(s) Oral daily  atorvastatin 20 milliGRAM(s) Oral at bedtime  chlorhexidine 4% Liquid 1 Application(s) Topical daily  dextrose 40% Gel 15 Gram(s) Oral once  dextrose 5%. 1000 milliLiter(s) (50 mL/Hr) IV Continuous <Continuous>  dextrose 5%. 1000 milliLiter(s) (100 mL/Hr) IV Continuous <Continuous>  dextrose 50% Injectable 25 Gram(s) IV Push once  dextrose 50% Injectable 12.5 Gram(s) IV Push once  dextrose 50% Injectable 25 Gram(s) IV Push once  glucagon  Injectable 1 milliGRAM(s) IntraMuscular once  heparin  Infusion 800 Unit(s)/Hr (10 mL/Hr) IV Continuous <Continuous>  insulin glargine Injectable (LANTUS) 17 Unit(s) SubCutaneous at bedtime  insulin lispro (ADMELOG) corrective regimen sliding scale   SubCutaneous three times a day before meals  insulin lispro (ADMELOG) corrective regimen sliding scale   SubCutaneous at bedtime  insulin lispro Injectable (ADMELOG) 6 Unit(s) SubCutaneous three times a day before meals  losartan 25 milliGRAM(s) Oral daily  metoprolol tartrate 12.5 milliGRAM(s) Oral two times a day  torsemide 40 milliGRAM(s) Oral daily    MEDICATIONS  (PRN):        LABS                                            13.1                  Neurophils% (auto):   55.8   (03-26 @ 05:29):    8.25 )-----------(336          Lymphocytes% (auto):  31.9                                          41.1                   Eosinphils% (auto):   1.6      Manual%: Neutrophils x    ; Lymphocytes x    ; Eosinophils x    ; Bands%: x    ; Blasts x                                    137    |  102    |  17                  Calcium: 9.4   / iCa: x      (03-26 @ 05:29)    ----------------------------<  148       Magnesium: 1.9                              3.9     |  23     |  0.54             Phosphorous: 4.7      TPro  6.3    /  Alb  3.2    /  TBili  0.3    /  DBili  x      /  AST  16     /  ALT  21     /  AlkPhos  83     26 Mar 2021 05:29    ( 03-26 @ 05:29 )   PT: x    ;   INR: x      aPTT: 48.9 sec          ASSESSMENT & PLAN:   49y/o F w/ h/o T2DM  and Hypertension presents with progressive  worsening shortness of breath, orthopnea and chest pain for 1 wk in the setting of not taking medication admitted for fluid overload 2/2 new onset acute heart failure and mild DKA.      For Follow-Up:  [ ] f/u endo recs. per case management, insurance is MEDICARE  [ ] f/u for home regimen insulin   [ ] plan for viability test on 3/28 - 3/29  [ ] possible cath early next week. f/u ccu/cardiology recs  [ ] when time for discharge comes, dc meds should be sent to [facility] and sent home with pts CCU Transfer Note  ---------------------------    Transfer from: MICU  Transfer to:  (  ) Medicine    (x) Telemetry  7N 721B  (  ) RCU    (  ) Palliative    (  ) Stroke Unit     Accepting Physician: Dr. Jones      CCU COURSE    51y/o F w/ h/o T2DM (diagnosed 2005) and Hypertension (diagnosed 2005) p/w worsening shortness of breath and chest pain for 1 wk on 3/23. Due to insurance issues, was not able to take home metformin. COVID 19 was tested 2 days before admission. At the ED, pt was found to be in DKA with glucose level in 600s. Endo was consulted and was placed on insulin gtt.     During CCU stay, pt underwent TTE. TTE 3/24 showed EF 35-40%, normal mitral valve, minimal MR, normal trileaflet aortic valve, moderate to severe LV systolic dysfunction, apical thrombus seen, normal right ventricular size and function, normal pericardium and trace pericardial effusion, b/l pleural effusions. Pt was warranted for ischemic evaluation and underwent cath on 3/25. LHC/RHC 3/25 showed PCW 30, CO 3.94, CI 2.42, PLAD 80% stenosis, Mid LAD 60% stenosis, Mid circumflex 70%, RCA 75% stenosis, RPDA 70% stenosis, RPLS 60% stenosis at ostium.     OBJECTIVE --  Vital Signs Last 24 Hrs  T(C): 36.9 (26 Mar 2021 12:00), Max: 37.1 (26 Mar 2021 00:00)  T(F): 98.5 (26 Mar 2021 12:00), Max: 98.7 (26 Mar 2021 00:00)  HR: 105 (26 Mar 2021 12:00) (75 - 111)  BP: 109/68 (26 Mar 2021 11:00) (77/57 - 141/107)  BP(mean): 81 (26 Mar 2021 11:00) (65 - 117)  RR: 21 (26 Mar 2021 12:00) (12 - 31)  SpO2: 96% (26 Mar 2021 12:00) (94% - 100%)    I&O's Summary    25 Mar 2021 07:01  -  26 Mar 2021 07:00  --------------------------------------------------------  IN: 600 mL / OUT: 1920 mL / NET: -1320 mL    26 Mar 2021 07:01  -  26 Mar 2021 12:16  --------------------------------------------------------  IN: 400 mL / OUT: 300 mL / NET: 100 mL        MEDICATIONS  (STANDING):  aspirin enteric coated 81 milliGRAM(s) Oral daily  atorvastatin 20 milliGRAM(s) Oral at bedtime  chlorhexidine 4% Liquid 1 Application(s) Topical daily  dextrose 40% Gel 15 Gram(s) Oral once  dextrose 5%. 1000 milliLiter(s) (50 mL/Hr) IV Continuous <Continuous>  dextrose 5%. 1000 milliLiter(s) (100 mL/Hr) IV Continuous <Continuous>  dextrose 50% Injectable 25 Gram(s) IV Push once  dextrose 50% Injectable 12.5 Gram(s) IV Push once  dextrose 50% Injectable 25 Gram(s) IV Push once  glucagon  Injectable 1 milliGRAM(s) IntraMuscular once  heparin  Infusion 800 Unit(s)/Hr (10 mL/Hr) IV Continuous <Continuous>  insulin glargine Injectable (LANTUS) 17 Unit(s) SubCutaneous at bedtime  insulin lispro (ADMELOG) corrective regimen sliding scale   SubCutaneous three times a day before meals  insulin lispro (ADMELOG) corrective regimen sliding scale   SubCutaneous at bedtime  insulin lispro Injectable (ADMELOG) 6 Unit(s) SubCutaneous three times a day before meals  losartan 25 milliGRAM(s) Oral daily  metoprolol tartrate 12.5 milliGRAM(s) Oral two times a day  torsemide 40 milliGRAM(s) Oral daily    MEDICATIONS  (PRN):        LABS                                            13.1                  Neurophils% (auto):   55.8   (03-26 @ 05:29):    8.25 )-----------(336          Lymphocytes% (auto):  31.9                                          41.1                   Eosinphils% (auto):   1.6      Manual%: Neutrophils x    ; Lymphocytes x    ; Eosinophils x    ; Bands%: x    ; Blasts x                                    137    |  102    |  17                  Calcium: 9.4   / iCa: x      (03-26 @ 05:29)    ----------------------------<  148       Magnesium: 1.9                              3.9     |  23     |  0.54             Phosphorous: 4.7      TPro  6.3    /  Alb  3.2    /  TBili  0.3    /  DBili  x      /  AST  16     /  ALT  21     /  AlkPhos  83     26 Mar 2021 05:29    ( 03-26 @ 05:29 )   PT: x    ;   INR: x      aPTT: 48.9 sec          ASSESSMENT & PLAN:   51y/o F w/ h/o T2DM  and Hypertension presents with progressive  worsening shortness of breath, orthopnea and chest pain for 1 wk in the setting of not taking medication admitted for fluid overload 2/2 new onset acute heart failure and mild DKA.      For Follow-Up:  [ ] f/u endo recs. per case management, insurance is MEDICARE  [ ] f/u for home regimen insulin   [ ] plan for viability test on 3/28 - 3/29  [ ] possible cath early next week. f/u ccu/cardiology recs  [ ] when time for discharge comes, dc meds should be sent to [facility] and sent home with pts CCU Transfer Note  ---------------------------    Transfer from: MICU  Transfer to:  (  ) Medicine    (x) Telemetry  7N 721B  (  ) RCU    (  ) Palliative    (  ) Stroke Unit     Accepting Physician: Dr. Jones      CCU COURSE    51y/o F w/ h/o T2DM (diagnosed 2005) and Hypertension (diagnosed 2005) p/w worsening shortness of breath and chest pain for 1 wk on 3/23. Due to insurance issues, was not able to take home metformin. COVID 19 was tested 2 days before admission. At the ED, pt was found to be in DKA with glucose level in 600s. Endo was consulted and was placed on insulin gtt.     During CCU stay, pt underwent TTE. TTE 3/24 showed EF 35-40%, normal mitral valve, minimal MR, normal trileaflet aortic valve, moderate to severe LV systolic dysfunction, apical thrombus seen, normal right ventricular size and function, normal pericardium and trace pericardial effusion, b/l pleural effusions. Pt was warranted for ischemic evaluation and underwent cath on 3/25. LHC/RHC 3/25 showed PCW 30, CO 3.94, CI 2.42, PLAD 80% stenosis, Mid LAD 60% stenosis, Mid circumflex 70%, RCA 75% stenosis, RPDA 70% stenosis, RPLS 60% stenosis at ostium.     OBJECTIVE --  Vital Signs Last 24 Hrs  T(C): 36.9 (26 Mar 2021 12:00), Max: 37.1 (26 Mar 2021 00:00)  T(F): 98.5 (26 Mar 2021 12:00), Max: 98.7 (26 Mar 2021 00:00)  HR: 105 (26 Mar 2021 12:00) (75 - 111)  BP: 109/68 (26 Mar 2021 11:00) (77/57 - 141/107)  BP(mean): 81 (26 Mar 2021 11:00) (65 - 117)  RR: 21 (26 Mar 2021 12:00) (12 - 31)  SpO2: 96% (26 Mar 2021 12:00) (94% - 100%)    I&O's Summary    25 Mar 2021 07:01  -  26 Mar 2021 07:00  --------------------------------------------------------  IN: 600 mL / OUT: 1920 mL / NET: -1320 mL    26 Mar 2021 07:01  -  26 Mar 2021 12:16  --------------------------------------------------------  IN: 400 mL / OUT: 300 mL / NET: 100 mL        MEDICATIONS  (STANDING):  aspirin enteric coated 81 milliGRAM(s) Oral daily  atorvastatin 20 milliGRAM(s) Oral at bedtime  chlorhexidine 4% Liquid 1 Application(s) Topical daily  dextrose 40% Gel 15 Gram(s) Oral once  dextrose 5%. 1000 milliLiter(s) (50 mL/Hr) IV Continuous <Continuous>  dextrose 5%. 1000 milliLiter(s) (100 mL/Hr) IV Continuous <Continuous>  dextrose 50% Injectable 25 Gram(s) IV Push once  dextrose 50% Injectable 12.5 Gram(s) IV Push once  dextrose 50% Injectable 25 Gram(s) IV Push once  glucagon  Injectable 1 milliGRAM(s) IntraMuscular once  heparin  Infusion 800 Unit(s)/Hr (10 mL/Hr) IV Continuous <Continuous>  insulin glargine Injectable (LANTUS) 17 Unit(s) SubCutaneous at bedtime  insulin lispro (ADMELOG) corrective regimen sliding scale   SubCutaneous three times a day before meals  insulin lispro (ADMELOG) corrective regimen sliding scale   SubCutaneous at bedtime  insulin lispro Injectable (ADMELOG) 6 Unit(s) SubCutaneous three times a day before meals  losartan 25 milliGRAM(s) Oral daily  metoprolol tartrate 12.5 milliGRAM(s) Oral two times a day  torsemide 40 milliGRAM(s) Oral daily    MEDICATIONS  (PRN):        LABS                                            13.1                  Neurophils% (auto):   55.8   (03-26 @ 05:29):    8.25 )-----------(336          Lymphocytes% (auto):  31.9                                          41.1                   Eosinphils% (auto):   1.6      Manual%: Neutrophils x    ; Lymphocytes x    ; Eosinophils x    ; Bands%: x    ; Blasts x                                    137    |  102    |  17                  Calcium: 9.4   / iCa: x      (03-26 @ 05:29)    ----------------------------<  148       Magnesium: 1.9                              3.9     |  23     |  0.54             Phosphorous: 4.7      TPro  6.3    /  Alb  3.2    /  TBili  0.3    /  DBili  x      /  AST  16     /  ALT  21     /  AlkPhos  83     26 Mar 2021 05:29    ( 03-26 @ 05:29 )   PT: x    ;   INR: x      aPTT: 48.9 sec          ASSESSMENT & PLAN:   51y/o F w/ h/o T2DM  and Hypertension presents with progressive  worsening shortness of breath, orthopnea and chest pain for 1 wk in the setting of not taking medication admitted for fluid overload 2/2 new onset acute heart failure and mild DKA.      For Follow-Up:  [ ] f/u endo recs. per case management, insurance is MEDICARE  [ ] f/u for home regimen insulin   [ ] plan for viability test on 3/28 - 3/29  [ ] possible cath early next week. f/u ccu/cardiology recs  [ ] on heparin gtt. plan to transition to coumadin once cath done  [ ] when time for discharge comes, dc meds should be sent to [facility] and sent home with pts

## 2021-03-26 NOTE — ED PROVIDER NOTE - PRINCIPAL DIAGNOSIS
Received e advise from patients mother today requesting rx's be sent to [facility]. Patient was receiving supplies through [employer]. Perhaps she needs to have the rx sent to [facility] which is a DME supplier. Call placed to Elma Parra, technician with [facility], JAMES.    rx's and office notes were sent to [facility] 3/18/21.   Pulmonary edema cardiac cause

## 2021-03-26 NOTE — DISCHARGE NOTE PROVIDER - NSDCMRMEDTOKEN_GEN_ALL_CORE_FT
no home medication:    aspirin 81 mg oral delayed release tablet: 1 tab(s) orally once a day  atorvastatin 20 mg oral tablet: 1 tab(s) orally once a day (at bedtime)  glucometer (per patient&#x27;s insurance): Test blood sugars four times a day. Dispense #1 glucometer.  glucose tablets: Follow instructions on bottle when sugar is low.  heparin 100 units/mL-D5% intravenous solution: 900 international unit(s) intravenous drip  insulin glargine: 17 unit(s) subcutaneous once a day (at bedtime)  insulin lispro 100 units/mL injectable solution: 8 unit(s) injectable 3 times a day (before meals)  Insulin Pen Needles, 4mm: 1 application subcutaneously 4 times a day. ** Use with insulin pen **   Jardiance 10 mg oral tablet: 1 tab(s) orally once a day   lancets: 1 application subcutaneously 4 times a day   Lantus Solostar Pen 100 units/mL subcutaneous solution: 17 unit(s) subcutaneous once a day   losartan 25 mg oral tablet: 1 tab(s) orally once a day  metoprolol tartrate 25 mg oral tablet: 1 tab(s) orally 2 times a day  torsemide 20 mg oral tablet: 2 tab(s) orally once a day   aspirin 81 mg oral delayed release tablet: 1 tab(s) orally once a day  atorvastatin 20 mg oral tablet: 1 tab(s) orally once a day (at bedtime)  glucometer (per patient&#x27;s insurance): Test blood sugars four times a day. Dispense #1 glucometer.  glucose tablets: Follow instructions on bottle when sugar is low.  heparin 100 units/mL-D5% intravenous solution: 900 international unit(s) intravenous drip  insulin glargine: 17 unit(s) subcutaneous once a day (at bedtime)  insulin lispro 100 units/mL injectable solution: 8 unit(s) injectable 3 times a day (before meals)  Insulin Pen Needles, 4mm: 1 application subcutaneously 4 times a day. ** Use with insulin pen **   lancets: 1 application subcutaneously 4 times a day   Lantus Solostar Pen 100 units/mL subcutaneous solution: 17 unit(s) subcutaneous once a day   losartan 25 mg oral tablet: 1 tab(s) orally once a day  metoprolol tartrate 25 mg oral tablet: 1 tab(s) orally 2 times a day  torsemide 20 mg oral tablet: 2 tab(s) orally once a day

## 2021-03-26 NOTE — PROGRESS NOTE ADULT - ASSESSMENT
49y/o F w/ h/o T2DM  and Hypertension presents with progressive  worsening shortness of breath, orthopnea and chest pain for 1 wk in the setting of not taking medication admitted for  fluid overload 2/2 acute heart failure and? mild DKA    Cardiac studies:  - TTE 3/24: EF 35-40%, normal mitral valve, minimal MR, normal trileaflet aortic valve, moderate to severe LV systolic dysfunction, apical thrombus seen, normal right ventricular size and function, normal pericardium and trace pericardial effusion, b/l pleural effusions    Neuro:  - A&O x3  - no active issues    Resp:  Acute respiratory failure  - Resolved  - Presented with b/l lung crackles  - Was on BiPAP initially and improved with diuresis. No longer on biPAP  - Will wean off O2 as needed    Cardio:  Acute heart failure  - Presented with SOB, MICHAELS, orthopnea  - proBNP 2170  - s/p lasix gtt 5 mg/hr    - TTE 3/24: EF 35-40%, normal mitral valve, minimal MR, normal trileaflet aortic valve, moderate to severe LV systolic dysfunction, apical thrombus seen, normal right ventricular size and function, normal pericardium and trace pericardial effusion, b/l pleural effusions  - Will continue metoprolol tartrate 12.5 mg bid, losartan 25 mg qd, aspirin and statin  - Will continue daily weight, strict I/O's, DASH diet  - Will hold lasix  - Plan for [facility] on 3/25    HTN  - Will start on metoprolol tartrate 12.5 mg bid, losartan 25 mg qd, aspirin and statin  - Will monitor BP    LV thrombus  - TTE 3/24 noted apical thrombus  - Will continue heparin gtt    Renal:  - Will monitor Cr on BMP    GI:  - Will continue DASH, vegetarian diet    Endo:  T2DM  - Takes metformin at home  - Presented with glucose 659, AG 18  - Endo consulted. Recommended to initiate with insulin gtt, replete K as needed, BMP q4  - Ovn, gap closed to 10 but coming up to 15, glucose < 200, bicarb > 18, VBG pH > 7.39  - Currently on lantus 17 u and admelog 6 u premeals and low dose ISS  - Will f/u endo recs    ID:  Leukocytosis likely reactive in the setting of DKA and HF  - Will continue to monitor qd CBC  - Will monitor off abx    Prophylaxis:  - DVT ppx: heparin gtt  - Pregnancy profile negative  - Will put in social work, nutrition, and diabetes education consult   49y/o F w/ h/o T2DM  and Hypertension presents with progressive  worsening shortness of breath, orthopnea and chest pain for 1 wk in the setting of not taking medication admitted for  fluid overload 2/2 acute heart failure and? mild DKA    Cardiac studies:  - TTE 3/24: EF 35-40%, normal mitral valve, minimal MR, normal trileaflet aortic valve, moderate to severe LV systolic dysfunction, apical thrombus seen, normal right ventricular size and function, normal pericardium and trace pericardial effusion, b/l pleural effusions    Neuro:  - A&O x3  - no active issues    Resp:  Acute respiratory failure  - Resolved  - Presented with b/l lung crackles  - Was on BiPAP initially and improved with diuresis. No longer on biPAP  - Will wean off O2 as needed    Cardio:  Acute heart systolic failure  - Presented with SOB, MICHAELS, orthopnea  - proBNP 2170  - now off diuretic  - TTE 3/24: EF 35-40%, normal mitral valve, minimal MR, normal trileaflet aortic valve, moderate to severe LV systolic dysfunction, apical thrombus seen, normal right ventricular size and function, normal pericardium and trace pericardial effusion, b/l pleural effusions  - Cath done 3/25  - Will continue metoprolol tartrate 12.5 mg bid, losartan 25 mg qd, aspirin and statin  - Will continue daily weight, strict I/O's, DASH diet  - Will hold lasix  - Plan for [facility] on 3/25    HTN  - Will start on metoprolol tartrate 12.5 mg bid, losartan 25 mg qd, aspirin and statin  - Will monitor BP    LV thrombus  - TTE 3/24 noted apical thrombus  - Will continue heparin gtt    Renal:  - Will monitor Cr on BMP    GI:  - Will continue DASH, vegetarian diet    Endo:  T2DM  - Takes metformin at home  - Presented with glucose 659, AG 18  - Endo consulted. Recommended to initiate with insulin gtt, replete K as needed, BMP q4  - Ovn, gap closed to 10 but coming up to 15, glucose < 200, bicarb > 18, VBG pH > 7.39  - Currently on lantus 17 u and admelog 6 u premeals and low dose ISS  - Will f/u endo recs    ID:  Leukocytosis likely reactive in the setting of DKA and HF  - Will continue to monitor qd CBC  - Will monitor off abx    Prophylaxis:  - DVT ppx: heparin gtt  - Pregnancy profile negative  - Will put in social work, nutrition, and diabetes education consult   49y/o F w/ h/o T2DM  and Hypertension presents with progressive  worsening shortness of breath, orthopnea and chest pain for 1 wk in the setting of not taking medication admitted for  fluid overload 2/2 acute heart failure and? mild DKA    Cardiac studies:  - TTE 3/24: EF 35-40%, normal mitral valve, minimal MR, normal trileaflet aortic valve, moderate to severe LV systolic dysfunction, apical thrombus seen, normal right ventricular size and function, normal pericardium and trace pericardial effusion, b/l pleural effusions  - LHC/RHC: PCW 30, CO 3.94, CI 2.42, PLAD 80% stenosis, Mid LAD 60% stenosis, Mid circumflex 70%, RCA 75% stenosis, RPDA 70% stenosis, RPLS 60% stenosis at ostium    Neuro:  - A&O x3  - no active issues    Resp:  - No active issues  - Satting well on RA    Cardio:  New onset acute heart systolic failure  - Presented with SOB, MICHAELS, orthopnea  - proBNP 2170  - now off diuretic  - TTE 3/24: EF 35-40%, normal mitral valve, minimal MR, normal trileaflet aortic valve, moderate to severe LV systolic dysfunction, apical thrombus seen, normal right ventricular size and function, normal pericardium and trace pericardial effusion, b/l pleural effusions  - Cath done 3/25  - Will continue metoprolol tartrate 12.5 mg bid, losartan 25 mg qd, aspirin and statin 20 mg qhs  - Will continue daily weight, strict I/O's, DASH diet  - Plan for viability on 3/28-3/29    HTN  - Will start on metoprolol tartrate 12.5 mg bid, losartan 25 mg qd, aspirin and statin  - Will monitor BP    LV thrombus  - TTE 3/24 noted apical thrombus  - Will continue heparin gtt since possible cath next week    Renal:  - Will monitor Cr on BMP    GI:  - Will continue DASH, vegetarian diet    Endo:  T2DM  - Takes metformin at home  - Presented with glucose 659, AG 18  - Endo consulted. Recommended to initiate with insulin gtt, replete K as needed, BMP q4  - Ovn, gap closed to 10 but coming up to 15, glucose < 200, bicarb > 18, VBG pH > 7.39  - Currently on lantus 17 u and admelog 6 u premeals and low dose ISS  - Will f/u endo recs    ID:  Leukocytosis likely reactive in the setting of DKA and HF  - Will continue to monitor qd CBC  - Will monitor off abx    Prophylaxis:  - DVT ppx: heparin gtt  - Pregnancy profile negative  - Will put in social work, nutrition, and diabetes education consult

## 2021-03-27 LAB
ALBUMIN SERPL ELPH-MCNC: 2.9 G/DL — LOW (ref 3.3–5)
ALP SERPL-CCNC: 77 U/L — SIGNIFICANT CHANGE UP (ref 40–120)
ALT FLD-CCNC: 17 U/L — SIGNIFICANT CHANGE UP (ref 4–33)
ANION GAP SERPL CALC-SCNC: 13 MMOL/L — SIGNIFICANT CHANGE UP (ref 7–14)
APTT BLD: 66.1 SEC — HIGH (ref 27–36.3)
APTT BLD: 80.8 SEC — HIGH (ref 27–36.3)
APTT BLD: 81.6 SEC — HIGH (ref 27–36.3)
AST SERPL-CCNC: 13 U/L — SIGNIFICANT CHANGE UP (ref 4–32)
BASOPHILS # BLD AUTO: 0.05 K/UL — SIGNIFICANT CHANGE UP (ref 0–0.2)
BASOPHILS NFR BLD AUTO: 0.6 % — SIGNIFICANT CHANGE UP (ref 0–2)
BILIRUB SERPL-MCNC: 0.3 MG/DL — SIGNIFICANT CHANGE UP (ref 0.2–1.2)
BUN SERPL-MCNC: 14 MG/DL — SIGNIFICANT CHANGE UP (ref 7–23)
CALCIUM SERPL-MCNC: 9.4 MG/DL — SIGNIFICANT CHANGE UP (ref 8.4–10.5)
CHLORIDE SERPL-SCNC: 100 MMOL/L — SIGNIFICANT CHANGE UP (ref 98–107)
CO2 SERPL-SCNC: 24 MMOL/L — SIGNIFICANT CHANGE UP (ref 22–31)
CREAT SERPL-MCNC: 0.58 MG/DL — SIGNIFICANT CHANGE UP (ref 0.5–1.3)
EOSINOPHIL # BLD AUTO: 0.17 K/UL — SIGNIFICANT CHANGE UP (ref 0–0.5)
EOSINOPHIL NFR BLD AUTO: 2 % — SIGNIFICANT CHANGE UP (ref 0–6)
GLUCOSE BLDC GLUCOMTR-MCNC: 116 MG/DL — HIGH (ref 70–99)
GLUCOSE BLDC GLUCOMTR-MCNC: 116 MG/DL — HIGH (ref 70–99)
GLUCOSE BLDC GLUCOMTR-MCNC: 139 MG/DL — HIGH (ref 70–99)
GLUCOSE BLDC GLUCOMTR-MCNC: 177 MG/DL — HIGH (ref 70–99)
GLUCOSE SERPL-MCNC: 160 MG/DL — HIGH (ref 70–99)
HCT VFR BLD CALC: 40.6 % — SIGNIFICANT CHANGE UP (ref 34.5–45)
HCT VFR BLD CALC: 43.8 % — SIGNIFICANT CHANGE UP (ref 34.5–45)
HGB BLD-MCNC: 13 G/DL — SIGNIFICANT CHANGE UP (ref 11.5–15.5)
HGB BLD-MCNC: 14 G/DL — SIGNIFICANT CHANGE UP (ref 11.5–15.5)
IANC: 4.39 K/UL — SIGNIFICANT CHANGE UP (ref 1.5–8.5)
IMM GRANULOCYTES NFR BLD AUTO: 0.1 % — SIGNIFICANT CHANGE UP (ref 0–1.5)
LYMPHOCYTES # BLD AUTO: 2.95 K/UL — SIGNIFICANT CHANGE UP (ref 1–3.3)
LYMPHOCYTES # BLD AUTO: 35.5 % — SIGNIFICANT CHANGE UP (ref 13–44)
MAGNESIUM SERPL-MCNC: 1.8 MG/DL — SIGNIFICANT CHANGE UP (ref 1.6–2.6)
MCHC RBC-ENTMCNC: 25.1 PG — LOW (ref 27–34)
MCHC RBC-ENTMCNC: 25.4 PG — LOW (ref 27–34)
MCHC RBC-ENTMCNC: 32 GM/DL — SIGNIFICANT CHANGE UP (ref 32–36)
MCHC RBC-ENTMCNC: 32 GM/DL — SIGNIFICANT CHANGE UP (ref 32–36)
MCV RBC AUTO: 78.4 FL — LOW (ref 80–100)
MCV RBC AUTO: 79.3 FL — LOW (ref 80–100)
MONOCYTES # BLD AUTO: 0.73 K/UL — SIGNIFICANT CHANGE UP (ref 0–0.9)
MONOCYTES NFR BLD AUTO: 8.8 % — SIGNIFICANT CHANGE UP (ref 2–14)
NEUTROPHILS # BLD AUTO: 4.39 K/UL — SIGNIFICANT CHANGE UP (ref 1.8–7.4)
NEUTROPHILS NFR BLD AUTO: 53 % — SIGNIFICANT CHANGE UP (ref 43–77)
NRBC # BLD: 0 /100 WBCS — SIGNIFICANT CHANGE UP
NRBC # BLD: 0 /100 WBCS — SIGNIFICANT CHANGE UP
NRBC # FLD: 0 K/UL — SIGNIFICANT CHANGE UP
NRBC # FLD: 0 K/UL — SIGNIFICANT CHANGE UP
PHOSPHATE SERPL-MCNC: 4.3 MG/DL — SIGNIFICANT CHANGE UP (ref 2.5–4.5)
PLATELET # BLD AUTO: 318 K/UL — SIGNIFICANT CHANGE UP (ref 150–400)
PLATELET # BLD AUTO: 348 K/UL — SIGNIFICANT CHANGE UP (ref 150–400)
POTASSIUM SERPL-MCNC: 3.6 MMOL/L — SIGNIFICANT CHANGE UP (ref 3.5–5.3)
POTASSIUM SERPL-SCNC: 3.6 MMOL/L — SIGNIFICANT CHANGE UP (ref 3.5–5.3)
PROT SERPL-MCNC: 6.3 G/DL — SIGNIFICANT CHANGE UP (ref 6–8.3)
RBC # BLD: 5.18 M/UL — SIGNIFICANT CHANGE UP (ref 3.8–5.2)
RBC # BLD: 5.52 M/UL — HIGH (ref 3.8–5.2)
RBC # FLD: 13.4 % — SIGNIFICANT CHANGE UP (ref 10.3–14.5)
RBC # FLD: 13.8 % — SIGNIFICANT CHANGE UP (ref 10.3–14.5)
SODIUM SERPL-SCNC: 137 MMOL/L — SIGNIFICANT CHANGE UP (ref 135–145)
WBC # BLD: 8.3 K/UL — SIGNIFICANT CHANGE UP (ref 3.8–10.5)
WBC # BLD: 9.53 K/UL — SIGNIFICANT CHANGE UP (ref 3.8–10.5)
WBC # FLD AUTO: 8.3 K/UL — SIGNIFICANT CHANGE UP (ref 3.8–10.5)
WBC # FLD AUTO: 9.53 K/UL — SIGNIFICANT CHANGE UP (ref 3.8–10.5)

## 2021-03-27 RX ORDER — HEPARIN SODIUM 5000 [USP'U]/ML
INJECTION INTRAVENOUS; SUBCUTANEOUS
Qty: 25000 | Refills: 0 | Status: DISCONTINUED | OUTPATIENT
Start: 2021-03-27 | End: 2021-03-29

## 2021-03-27 RX ORDER — HEPARIN SODIUM 5000 [USP'U]/ML
2000 INJECTION INTRAVENOUS; SUBCUTANEOUS EVERY 6 HOURS
Refills: 0 | Status: DISCONTINUED | OUTPATIENT
Start: 2021-03-27 | End: 2021-03-29

## 2021-03-27 RX ORDER — HEPARIN SODIUM 5000 [USP'U]/ML
4500 INJECTION INTRAVENOUS; SUBCUTANEOUS EVERY 6 HOURS
Refills: 0 | Status: DISCONTINUED | OUTPATIENT
Start: 2021-03-27 | End: 2021-03-29

## 2021-03-27 RX ORDER — METOPROLOL TARTRATE 50 MG
25 TABLET ORAL
Refills: 0 | Status: DISCONTINUED | OUTPATIENT
Start: 2021-03-27 | End: 2021-03-29

## 2021-03-27 RX ORDER — HEPARIN SODIUM 5000 [USP'U]/ML
900 INJECTION INTRAVENOUS; SUBCUTANEOUS
Qty: 25000 | Refills: 0 | Status: DISCONTINUED | OUTPATIENT
Start: 2021-03-27 | End: 2021-03-27

## 2021-03-27 RX ORDER — MAGNESIUM SULFATE 500 MG/ML
2 VIAL (ML) INJECTION ONCE
Refills: 0 | Status: COMPLETED | OUTPATIENT
Start: 2021-03-27 | End: 2021-03-27

## 2021-03-27 RX ORDER — POTASSIUM CHLORIDE 20 MEQ
40 PACKET (EA) ORAL ONCE
Refills: 0 | Status: COMPLETED | OUTPATIENT
Start: 2021-03-27 | End: 2021-03-27

## 2021-03-27 RX ADMIN — HEPARIN SODIUM 1100 UNIT(S)/HR: 5000 INJECTION INTRAVENOUS; SUBCUTANEOUS at 18:42

## 2021-03-27 RX ADMIN — Medication 81 MILLIGRAM(S): at 12:13

## 2021-03-27 RX ADMIN — Medication 40 MILLIGRAM(S): at 05:40

## 2021-03-27 RX ADMIN — Medication 40 MILLIEQUIVALENT(S): at 12:27

## 2021-03-27 RX ADMIN — Medication 12.5 MILLIGRAM(S): at 05:40

## 2021-03-27 RX ADMIN — Medication 50 GRAM(S): at 12:27

## 2021-03-27 RX ADMIN — Medication 6 UNIT(S): at 17:42

## 2021-03-27 RX ADMIN — Medication 1: at 08:26

## 2021-03-27 RX ADMIN — INSULIN GLARGINE 17 UNIT(S): 100 INJECTION, SOLUTION SUBCUTANEOUS at 21:33

## 2021-03-27 RX ADMIN — Medication 25 MILLIGRAM(S): at 17:42

## 2021-03-27 RX ADMIN — ATORVASTATIN CALCIUM 20 MILLIGRAM(S): 80 TABLET, FILM COATED ORAL at 21:33

## 2021-03-27 RX ADMIN — LOSARTAN POTASSIUM 25 MILLIGRAM(S): 100 TABLET, FILM COATED ORAL at 05:40

## 2021-03-27 RX ADMIN — HEPARIN SODIUM 9 UNIT(S)/HR: 5000 INJECTION INTRAVENOUS; SUBCUTANEOUS at 10:34

## 2021-03-27 RX ADMIN — CHLORHEXIDINE GLUCONATE 1 APPLICATION(S): 213 SOLUTION TOPICAL at 12:12

## 2021-03-27 RX ADMIN — Medication 6 UNIT(S): at 12:13

## 2021-03-27 RX ADMIN — Medication 6 UNIT(S): at 08:26

## 2021-03-27 NOTE — PROGRESS NOTE ADULT - SUBJECTIVE AND OBJECTIVE BOX
Subjective/Objective: feeling much better. No sob or chest pain    Overnight event: no issue over night  Tele event:NSR    MEDICATIONS    aspirin enteric coated 81 milliGRAM(s) Oral daily  heparin  Infusion 900 Unit(s)/Hr IV Continuous <Continuous>  losartan 25 milliGRAM(s) Oral daily  metoprolol tartrate 25 milliGRAM(s) Oral two times a day  torsemide 40 milliGRAM(s) Oral daily  atorvastatin 20 milliGRAM(s) Oral at bedtime  dextrose 40% Gel 15 Gram(s) Oral once  dextrose 50% Injectable 25 Gram(s) IV Push once  dextrose 50% Injectable 12.5 Gram(s) IV Push once  dextrose 50% Injectable 25 Gram(s) IV Push once  glucagon  Injectable 1 milliGRAM(s) IntraMuscular once  insulin glargine Injectable (LANTUS) 17 Unit(s) SubCutaneous at bedtime  insulin lispro (ADMELOG) corrective regimen sliding scale   SubCutaneous three times a day before meals  insulin lispro (ADMELOG) corrective regimen sliding scale   SubCutaneous at bedtime  insulin lispro Injectable (ADMELOG) 6 Unit(s) SubCutaneous three times a day before mealschlorhexidine 4% Liquid 1 Application(s) Topical daily  dextrose 5%. 1000 milliLiter(s) IV Continuous <Continuous>  dextrose 5%. 1000 milliLiter(s) IV Continuous <Continuous>        REVIEW OF SYSTEMS    General: no fatigue/malaise, weight loss/gain.  Skin: no rashes.  Ophthalmologic: no blurred vision, no loss of vision. 	  ENT: no sore throat, rhinorrhea, sinus congestion.  Cardiovascular:no chest pain ,no palpitation,no dizziness,no diaphoresis,no edema  Respiratory: no SOB, cough or wheeze.  Gastrointestinal:  no N/V/D, no melena/hematemesis/hematochezia.  Genitourinary: no dysuria/hesitancy or hematuria.  Musculoskeletal: no myalgias or arthralgias.  Neurological: no changes in vision or hearing, no lightheadedness/dizziness, no syncope/near syncope	  Psychiatric: no unusual stress/anxiety      	    ICU Vital Signs Last 24 Hrs  T(C): 36.8 (27 Mar 2021 04:54), Max: 37.7 (26 Mar 2021 16:00)  T(F): 98.3 (27 Mar 2021 04:54), Max: 99.8 (26 Mar 2021 16:00)  HR: 89 (27 Mar 2021 04:54) (87 - 108)  BP: 120/79 (27 Mar 2021 04:54) (94/66 - 120/79)  BP(mean): 76 (26 Mar 2021 18:00) (76 - 89)  RR: 18 (27 Mar 2021 04:54) (17 - 26)  SpO2: 96% (27 Mar 2021 04:54) (94% - 98%)      PHYSICAL EXAMINATION  Appearance: NAD, no distress  HEENT: Moist Mucous Membranes, Anicteric, PERRL, EOMI  Cardiovascular: Regular rate and rhythm, Normal S1 S2, No JVD, No murmurs  Respiratory: Lungs clear to auscultation. No rales, No rhonchi, No wheezing. No tenderness to palpation  Gastrointestinal:  Soft, Non-tender, + BS  Neurologic: Non-focal, A&Ox3  Skin: Warm and dry, No rashes, No ecchymosis, No cyanosis  Musculoskeletal: No clubbing, No cyanosis, No edema  Vascular: Peripheral pulses palpable 2+ bilaterally  Psychiatry: Mood & affect appropriate      	    		      I&O's Summary    26 Mar 2021 07:01  -  27 Mar 2021 07:00  --------------------------------------------------------  IN: 1030 mL / OUT: 3100 mL / NET: -2070 mL    	 	  LABORATORY VALUES	 	                          13.0   8.30  )-----------( 318      ( 27 Mar 2021 07:20 )             40.6           137  |  100  |  14  ----------------------------<  160<H>  3.6   |  24  |  0.58      137  |  102  |  17  ----------------------------<  148<H>  3.9   |  23  |  0.54    Ca    9.4      27 Mar 2021 07:29  Ca    9.4      26 Mar 2021 05:29  Phos  4.3       Phos  4.7       Mg     1.8       Mg     1.9         TPro  6.3  /  Alb  2.9<L>  /  TBili  0.3  /  DBili  x   /  AST  13  /  ALT  17  /  AlkPhos  77    TPro  6.3  /  Alb  3.2<L>  /  TBili  0.3  /  DBili  x   /  AST  16  /  ALT  21  /  AlkPhos  83      LIVER FUNCTIONS - ( 27 Mar 2021 07:29 )  Alb: 2.9 g/dL / Pro: 6.3 g/dL / ALK PHOS: 77 U/L / ALT: 17 U/L / AST: 13 U/L / GGT: x           Activated Partial Thromboplastin Time: 81.6 sec ( @ 07:20)      CARDIAC MARKERS:          Serum Pro-Brain Natriuretic Peptide: 2847 pg/mL ( @ 18:34)  Serum Pro-Brain Natriuretic Peptide: 2107 pg/mL ( @ 14:37)       @ 17:02  Cholesterol, Serum - 198  Direct LDL- --  HDL Cholesterol, Serum- 63  Triglycerides, Serum- 89      Thyroid Stimulating Hormone, Serum: 1.77 uIU/mL ( @ 14:38)    CAPILLARY BLOOD GLUCOSE      POCT Blood Glucose.: 116 mg/dL (27 Mar 2021 11:47)  EKG:  Diagnostic testing:  cath:  < from: Cardiac Cath Lab - Adult (21 @ 16:17) >  HEMODYNAMICS: There is moderate to severe pulmonary hypertension.  VENTRICLES: No left ventriculogram was performed.  CORONARY VESSELS: The coronary circulation is right dominant.LM:   --  Mid left main: There was a discrete 20 % stenosis.  LAD:--  Proximal LAD: There was a tubular 80 % stenosis. There was TIMIgrade 3 flow through the vessel (brisk flow).  --  Mid LAD: There was a diffuse 60 % stenosis. There was BASIL grade 3 flowthrough the vessel (brisk flow).  CX:   --  Circumflex: The vessel was small sized.  --  Proximal circumflex: There was a tubular 30 % stenosis.  --  Mid circumflex: There was a diffuse 70 % stenosis.  RCA:   --  Proximal RCA: There was a tubular 75 % stenosis in the distalthird of the vessel segment. There was BASIL grade 3 flow through the  vessel (brisk flow).  --  RPDA: The vessel was small sized. There was a diffuse 70 % stenosis inthe proximal third of the vessel segment. There was BASIL grade 3 flowthrough the vessel (brisk flow).  --  RPLS: There was a discrete 60 % stenosis at the ostium of the vesselsegment. There was BASIL grade 3 flow through the vessel (brisk flow). In asecond lesion, there was a discrete 80 % stenosis in the distal third ofthe vessel segment, just before RPL1.There was BASIL grade 3 flow through  the vessel (brisk flow).  --  RPL1: The vessel was small sized. Angiography showed severeatherosclerosis.  COMPLICATIONS: There were no complications.  DIAGNOSTIC RECOMMENDATIONS: Medical management is recommended. Recommendviability testing to guide CAD management. Recommend diuresis for elevated  LV filling pressure.  Prepared and signed by  Shady Marroquin M.D.  Signed 2021 17:34:00  HEMODYNAMIC TABLES  Pressures:  Baseline  Pressures:  - HR: 100  Pressures:  - Rhythm:  Pressures:  -- Aortic Pressure (S/D/M): 139/84/106  Pressures:  -- Pulmonary Artery (S/D/M): 56/30/41  Pressures:  -- Pulmonary Capillary Wedge: 33/40/33  Pressures:  -- Right Atrium (a/v/M): 16/15/12  Pressures:  -- Right Ventricle (s/edp): 58/13/--  O2 Sats:  Baseline  O2 Sats:  - HR: 100  O2 Sats:  - Rhythm:  O2 Sats:  -- AO: 12.8/91.2/15.88  O2 Sats:  -- AO: 13/94.3/16.67  O2 Sats:  -- PA: 12.9/61.1/10.72  Outputs:  Baseline  Outputs:  -- CALCULATIONS[age]  Outputs:  -- CALCULATIONS: Body Surface Area: 1.62  Outputs:  -- CALCULATIONS: Height in cm: 160.00  Outputs:  -- CALCULATIONS: Sex: Female  Outputs:  -- CALCULATIONS: Weight in k.20  Outputs:  -- OUTPUTS: CO by Garcia: 3.41  Outputs:  -- OUTPUTS: Garcia cardiac index: 2.10  Outputs:  -- OUTPUTS: O2 consumption: 203.04  Outputs:  -- OUTPUTS: Vo2 Indexed: 125.00  Outputs:  -- RESISTANCES: Left ventricular stroke work: 33.20  Outputs:  -- RESISTANCES: Left Ventricular Stroke Work index: 20.44  Outputs:  -- RESISTANCES: Pulmonary vascular index (dsc): 304.71  Outputs:  -- RESISTANCES: Pulmonary vascular index (Wood Units): 3.81  Outputs:  -- RESISTANCES: Pulmonary vascular resistance (dsc): 187.59  Outputs:  -- RESISTANCES: Pulmonary vascularresistance (Wood Units): 2.35  Outputs:  -- RESISTANCES: PVR_SVR Ratio: 0.09  Outputs:  -- RESISTANCES: Right ventricular stroke work: 13.19  Outputs:  -- RESISTANCES: Right ventricular stroke work index: 8.12  Outputs:  -- RESISTANCES: Systemic vascular index (dsc): 3580.34  Outputs:  -- RESISTANCES: Systemic vascular index (Wood Units): 44.77  Outputs:  -- RESISTANCES: Systemic vascular resistance (dsc): 2204.16  Outputs:  -- RESISTANCES: Systemic vascular resistance (Wood Units): 27.56  Outputs:  -- RESISTANCES: Total pulmonary index (dsc): 1561.64  Outputs:  -- RESISTANCES: Total pulmonary index (Wood Units): 19.53  Outputs:  -- RESISTANCES: Total pulmonary resistance (dsc): 961.39  Outputs:  -- RESISTANCES: Total pulmonary resistance (Wood Units): 12.02  Outputs:  -- RESISTANCES: Total vascular index (Wood Units): 50.48  Outputs:  -- RESISTANCES: Total vascular resistance (dsc): 2485.54  Outputs:  -- RESISTANCES: Total vascular resistance (Wood Units): 31.08  Outputs:  -- RESISTANCES: Total vascular resistance index (dsc): 4037.41  Outputs:  -- RESISTANCES: TPR_TVR Ratio: 0.39  Outputs:  -- SHUNTS: Pulmonary flow: 3.41  Outputs:  -- SHUNTS: Qp Indexed: 2.10  Outputs:  -- SHUNTS: Qs Indexed: 2.10  Outputs:  -- SHUNTS: Systemic flow: 3.41    < end of copied text >  echo:  < from: TTE with Doppler (w/Cont) (21 @ 18:14) >Ef 35-40%  1. Normal mitral valve. Minimal mitral regurgitation.2. Normal trileaflet aortic valve.3. Endocardial visualization enhanced with intravenousinjection of echo contrast (Definity). The mid to distal  septum, apex, distal anterior, distal inferior walls arehypokinetic. Moderat to severe left ventricular systolicdysfunction. Apical thrombus is seen.4. Normal right ventricularsize and function.  5. Normal pericardium with trace pericardial effusion.6. Bilateral pleural effusions.    < end of copied text >      Assessment and Plan:    51y/o F w/ h/o T2DM (diagnosed ) and Hypertension (diagnosed ) p/w worsening shortness of breath and chest pain for 1 wk on 3/23. Due to insurance issues, was not able to take home metformin. COVID 19 was tested 2 days before admission. At the ED, pt was found to be in DKA with glucose level in 600s. Endo was consulted and was placed on insulin gtt. She was admitted in CCU. Insulin drip  was transition to SQ. TTE. TTE 3/24 showed EF 35-40%,  moderate to severe LV systolic dysfunction, apical thrombus seen, and trace pericardial effusion, b/l pleural effusions. Pt was warranted for ischemic evaluation and underwent cath on 3/25. LHC/RHC 3/25 showed PCW 30, CO 3.94, CI 2.42, pLAD 80% stenosis, Mid LAD 60% stenosis, Mid circumflex 70%, RCA 75% stenosis, RPDA 70% stenosis, RPLS 60% stenosis at ostium. plan for viability study     Plan    Neuro:  - A&O x3  - no active issues    Resp:  - No active issues  - Satting well on RA    Cardio:  New onset acute heart systolic failure in the setting of CAD-   - proBNP 2170-   - TTE 3/24: EF 35-40%, normal mitral valve, minimal MR, normal trileaflet aortic valve, moderate to severe LV systolic dysfunction, apical thrombus seen, normal right ventricular size and function, normal pericardium and trace pericardial effusion, b/l pleural effusions  - Cath done 3/25 sever multiple vessel disease- Plan for viability on 3/28-3/29  -  continue metoprolol tartrate 25 mg bid, losartan 25 mg qd, aspirin and statin 20 mg qhs  - Will continue daily weight, strict I/O's, DASH diet      HTN  -  metoprolol tartrate 25 mg bid, losartan 25 mg qd, aspirin and statin  - Will monitor BP    LV thrombus  - TTE 3/24 noted apical thrombus  - Will continue heparin gtt ( can change to full anticoagulation dose/goal PTT ) since possible cath next week    Renal:  - Will monitor Cr on BMP    GI:  - Will continue DASH, vegetarian diet    Endo:  T2DM  - Takes metformin at home  - Presented with glucose 659, AG 18  - Endo consulted.  initialy on  insulin gtt,- transitioned to lantus 17 u and admelog 6 u premeals and low dose ISS on ce gap closed   - Will f/u endo recs    ID:  Leukocytosis likely reactive in the setting of DKA and HF  - Will continue to monitor qd CBC  - Will monitor off abx    Prophylaxis:  - DVT ppx: heparin gtt  - Pregnancy profile negative  - Will put in social work, nutrition, and diabetes education consult

## 2021-03-27 NOTE — CHART NOTE - NSCHARTNOTEFT_GEN_A_CORE
D/w cards np Bruce valenzuela for pt to be on full ac heparin normogram with ptt goal as per protocol .  No need for pt specific heparin gtt

## 2021-03-28 LAB
ALBUMIN SERPL ELPH-MCNC: 3.1 G/DL — LOW (ref 3.3–5)
ALP SERPL-CCNC: 72 U/L — SIGNIFICANT CHANGE UP (ref 40–120)
ALT FLD-CCNC: 13 U/L — SIGNIFICANT CHANGE UP (ref 4–33)
ANION GAP SERPL CALC-SCNC: 11 MMOL/L — SIGNIFICANT CHANGE UP (ref 7–14)
APTT BLD: 145.6 SEC — CRITICAL HIGH (ref 27–36.3)
APTT BLD: 71.4 SEC — HIGH (ref 27–36.3)
APTT BLD: 81 SEC — HIGH (ref 27–36.3)
APTT BLD: 86.7 SEC — HIGH (ref 27–36.3)
AST SERPL-CCNC: 14 U/L — SIGNIFICANT CHANGE UP (ref 4–32)
BASOPHILS # BLD AUTO: 0.06 K/UL — SIGNIFICANT CHANGE UP (ref 0–0.2)
BASOPHILS NFR BLD AUTO: 0.8 % — SIGNIFICANT CHANGE UP (ref 0–2)
BILIRUB SERPL-MCNC: 0.3 MG/DL — SIGNIFICANT CHANGE UP (ref 0.2–1.2)
BUN SERPL-MCNC: 11 MG/DL — SIGNIFICANT CHANGE UP (ref 7–23)
CALCIUM SERPL-MCNC: 9.4 MG/DL — SIGNIFICANT CHANGE UP (ref 8.4–10.5)
CHLORIDE SERPL-SCNC: 102 MMOL/L — SIGNIFICANT CHANGE UP (ref 98–107)
CO2 SERPL-SCNC: 25 MMOL/L — SIGNIFICANT CHANGE UP (ref 22–31)
CREAT SERPL-MCNC: 0.59 MG/DL — SIGNIFICANT CHANGE UP (ref 0.5–1.3)
EOSINOPHIL # BLD AUTO: 0.18 K/UL — SIGNIFICANT CHANGE UP (ref 0–0.5)
EOSINOPHIL NFR BLD AUTO: 2.3 % — SIGNIFICANT CHANGE UP (ref 0–6)
GLUCOSE BLDC GLUCOMTR-MCNC: 107 MG/DL — HIGH (ref 70–99)
GLUCOSE BLDC GLUCOMTR-MCNC: 174 MG/DL — HIGH (ref 70–99)
GLUCOSE BLDC GLUCOMTR-MCNC: 187 MG/DL — HIGH (ref 70–99)
GLUCOSE BLDC GLUCOMTR-MCNC: 290 MG/DL — HIGH (ref 70–99)
GLUCOSE BLDC GLUCOMTR-MCNC: 302 MG/DL — HIGH (ref 70–99)
GLUCOSE SERPL-MCNC: 85 MG/DL — SIGNIFICANT CHANGE UP (ref 70–99)
HCT VFR BLD CALC: 42.4 % — SIGNIFICANT CHANGE UP (ref 34.5–45)
HCT VFR BLD CALC: 43.2 % — SIGNIFICANT CHANGE UP (ref 34.5–45)
HGB BLD-MCNC: 13.5 G/DL — SIGNIFICANT CHANGE UP (ref 11.5–15.5)
HGB BLD-MCNC: 13.8 G/DL — SIGNIFICANT CHANGE UP (ref 11.5–15.5)
IANC: 4.24 K/UL — SIGNIFICANT CHANGE UP (ref 1.5–8.5)
IMM GRANULOCYTES NFR BLD AUTO: 0.4 % — SIGNIFICANT CHANGE UP (ref 0–1.5)
LYMPHOCYTES # BLD AUTO: 2.74 K/UL — SIGNIFICANT CHANGE UP (ref 1–3.3)
LYMPHOCYTES # BLD AUTO: 34.4 % — SIGNIFICANT CHANGE UP (ref 13–44)
MAGNESIUM SERPL-MCNC: 2.1 MG/DL — SIGNIFICANT CHANGE UP (ref 1.6–2.6)
MCHC RBC-ENTMCNC: 25 PG — LOW (ref 27–34)
MCHC RBC-ENTMCNC: 25.5 PG — LOW (ref 27–34)
MCHC RBC-ENTMCNC: 31.8 GM/DL — LOW (ref 32–36)
MCHC RBC-ENTMCNC: 31.9 GM/DL — LOW (ref 32–36)
MCV RBC AUTO: 78.4 FL — LOW (ref 80–100)
MCV RBC AUTO: 79.7 FL — LOW (ref 80–100)
MONOCYTES # BLD AUTO: 0.71 K/UL — SIGNIFICANT CHANGE UP (ref 0–0.9)
MONOCYTES NFR BLD AUTO: 8.9 % — SIGNIFICANT CHANGE UP (ref 2–14)
NEUTROPHILS # BLD AUTO: 4.24 K/UL — SIGNIFICANT CHANGE UP (ref 1.8–7.4)
NEUTROPHILS NFR BLD AUTO: 53.2 % — SIGNIFICANT CHANGE UP (ref 43–77)
NRBC # BLD: 0 /100 WBCS — SIGNIFICANT CHANGE UP
NRBC # BLD: 0 /100 WBCS — SIGNIFICANT CHANGE UP
NRBC # FLD: 0 K/UL — SIGNIFICANT CHANGE UP
NRBC # FLD: 0 K/UL — SIGNIFICANT CHANGE UP
PHOSPHATE SERPL-MCNC: 4.5 MG/DL — SIGNIFICANT CHANGE UP (ref 2.5–4.5)
PLATELET # BLD AUTO: 339 K/UL — SIGNIFICANT CHANGE UP (ref 150–400)
PLATELET # BLD AUTO: 346 K/UL — SIGNIFICANT CHANGE UP (ref 150–400)
POTASSIUM SERPL-MCNC: 3.8 MMOL/L — SIGNIFICANT CHANGE UP (ref 3.5–5.3)
POTASSIUM SERPL-SCNC: 3.8 MMOL/L — SIGNIFICANT CHANGE UP (ref 3.5–5.3)
PROT SERPL-MCNC: 6.4 G/DL — SIGNIFICANT CHANGE UP (ref 6–8.3)
RBC # BLD: 5.41 M/UL — HIGH (ref 3.8–5.2)
RBC # BLD: 5.42 M/UL — HIGH (ref 3.8–5.2)
RBC # FLD: 13.5 % — SIGNIFICANT CHANGE UP (ref 10.3–14.5)
RBC # FLD: 13.5 % — SIGNIFICANT CHANGE UP (ref 10.3–14.5)
SODIUM SERPL-SCNC: 138 MMOL/L — SIGNIFICANT CHANGE UP (ref 135–145)
WBC # BLD: 7.96 K/UL — SIGNIFICANT CHANGE UP (ref 3.8–10.5)
WBC # BLD: 8.99 K/UL — SIGNIFICANT CHANGE UP (ref 3.8–10.5)
WBC # FLD AUTO: 7.96 K/UL — SIGNIFICANT CHANGE UP (ref 3.8–10.5)
WBC # FLD AUTO: 8.99 K/UL — SIGNIFICANT CHANGE UP (ref 3.8–10.5)

## 2021-03-28 PROCEDURE — 99232 SBSQ HOSP IP/OBS MODERATE 35: CPT

## 2021-03-28 RX ADMIN — Medication 81 MILLIGRAM(S): at 11:48

## 2021-03-28 RX ADMIN — ATORVASTATIN CALCIUM 20 MILLIGRAM(S): 80 TABLET, FILM COATED ORAL at 21:22

## 2021-03-28 RX ADMIN — CHLORHEXIDINE GLUCONATE 1 APPLICATION(S): 213 SOLUTION TOPICAL at 11:48

## 2021-03-28 RX ADMIN — Medication 6 UNIT(S): at 17:38

## 2021-03-28 RX ADMIN — Medication 25 MILLIGRAM(S): at 17:38

## 2021-03-28 RX ADMIN — HEPARIN SODIUM 1100 UNIT(S)/HR: 5000 INJECTION INTRAVENOUS; SUBCUTANEOUS at 01:26

## 2021-03-28 RX ADMIN — Medication 25 MILLIGRAM(S): at 06:37

## 2021-03-28 RX ADMIN — LOSARTAN POTASSIUM 25 MILLIGRAM(S): 100 TABLET, FILM COATED ORAL at 06:36

## 2021-03-28 RX ADMIN — Medication 6 UNIT(S): at 08:28

## 2021-03-28 RX ADMIN — HEPARIN SODIUM 0 UNIT(S)/HR: 5000 INJECTION INTRAVENOUS; SUBCUTANEOUS at 09:07

## 2021-03-28 RX ADMIN — HEPARIN SODIUM 900 UNIT(S)/HR: 5000 INJECTION INTRAVENOUS; SUBCUTANEOUS at 17:07

## 2021-03-28 RX ADMIN — Medication 6 UNIT(S): at 11:48

## 2021-03-28 RX ADMIN — HEPARIN SODIUM 900 UNIT(S)/HR: 5000 INJECTION INTRAVENOUS; SUBCUTANEOUS at 10:21

## 2021-03-28 RX ADMIN — Medication 40 MILLIGRAM(S): at 11:48

## 2021-03-28 RX ADMIN — Medication 3: at 17:38

## 2021-03-28 RX ADMIN — Medication 1: at 11:49

## 2021-03-28 RX ADMIN — INSULIN GLARGINE 17 UNIT(S): 100 INJECTION, SOLUTION SUBCUTANEOUS at 21:22

## 2021-03-28 NOTE — PROGRESS NOTE ADULT - SUBJECTIVE AND OBJECTIVE BOX
Subjective/Objective: feeling better    Overnight event:no issues   Tele event:NSR    MEDICATIONS    aspirin enteric coated 81 milliGRAM(s) Oral daily  heparin   Injectable 4500 Unit(s) IV Push every 6 hours PRN  heparin   Injectable 2000 Unit(s) IV Push every 6 hours PRN  heparin  Infusion.  Unit(s)/Hr IV Continuous <Continuous>  losartan 25 milliGRAM(s) Oral daily  metoprolol tartrate 25 milliGRAM(s) Oral two times a day  torsemide 40 milliGRAM(s) Oral daily  atorvastatin 20 milliGRAM(s) Oral at bedtime  dextrose 40% Gel 15 Gram(s) Oral once  dextrose 50% Injectable 25 Gram(s) IV Push once  dextrose 50% Injectable 12.5 Gram(s) IV Push once  dextrose 50% Injectable 25 Gram(s) IV Push once  glucagon  Injectable 1 milliGRAM(s) IntraMuscular once  insulin glargine Injectable (LANTUS) 17 Unit(s) SubCutaneous at bedtime  insulin lispro (ADMELOG) corrective regimen sliding scale   SubCutaneous three times a day before meals  insulin lispro (ADMELOG) corrective regimen sliding scale   SubCutaneous at bedtime  insulin lispro Injectable (ADMELOG) 6 Unit(s) SubCutaneous three times a day before meals  chlorhexidine 4% Liquid 1 Application(s) Topical daily  dextrose 5%. 1000 milliLiter(s) IV Continuous <Continuous>  dextrose 5%. 1000 milliLiter(s) IV Continuous <Continuous>      REVIEW OF SYSTEMS    General: no fatigue/malaise, weight loss/gain.  Skin: no rashes.  Ophthalmologic: no blurred vision, no loss of vision. 	  ENT: no sore throat, rhinorrhea, sinus congestion.  Cardiovascular:no chest pain ,no palpitation,no dizziness,no diaphoresis,no edema  Respiratory: no SOB, cough or wheeze.  Gastrointestinal:  no N/V/D, no melena/hematemesis/hematochezia.  Genitourinary: no dysuria/hesitancy or hematuria.  Musculoskeletal: no myalgias or arthralgias.  Neurological: no changes in vision or hearing, no lightheadedness/dizziness, no syncope/near syncope	  Psychiatric: no unusual stress/anxiety    	    ICU Vital Signs Last 24 Hrs  T(C): 36.4 (28 Mar 2021 05:20), Max: 36.9 (27 Mar 2021 20:45)  T(F): 97.5 (28 Mar 2021 05:20), Max: 98.4 (27 Mar 2021 20:45)  HR: 77 (28 Mar 2021 05:20) (77 - 96)  BP: 98/68 (28 Mar 2021 05:20) (98/68 - 127/78)  RR: 18 (28 Mar 2021 05:20) (18 - 19)  SpO2: 98% (28 Mar 2021 05:20) (97% - 99%)      PHYSICAL EXAMINATION  Appearance: NAD, no distress  HEENT: Moist Mucous Membranes, Anicteric, PERRL, EOMI  Cardiovascular: Regular rate and rhythm, Normal S1 S2, No JVD, No murmurs  Respiratory: Lungs clear to auscultation. No rales, No rhonchi, No wheezing. No tenderness to palpation  Gastrointestinal:  Soft, Non-tender, + BS  Neurologic: Non-focal, A&Ox3  Skin: Warm and dry, No rashes, No ecchymosis, No cyanosis  Musculoskeletal: No clubbing, No cyanosis, No edema  Vascular: Peripheral pulses palpable 2+ bilaterally  Psychiatry: Mood & affect appropriate      	    		      I&O's Summary    27 Mar 2021 07:01  -  28 Mar 2021 07:00  --------------------------------------------------------  IN: 1103 mL / OUT: 1900 mL / NET: -797 mL    	 	  LABORATORY VALUES	 	                          13.8   7.96  )-----------( 346      ( 28 Mar 2021 07:40 )             43.2           138  |  102  |  11  ----------------------------<  85  3.8   |  25  |  0.59  27    137  |  100  |  14  ----------------------------<  160<H>  3.6   |  24  |  0.58    Ca    9.4      28 Mar 2021 07:40  Ca    9.4      27 Mar 2021 07:29  Phos  4.5       Phos  4.3       Mg     2.1     03-28  Mg     1.8         TPro  6.4  /  Alb  3.1<L>  /  TBili  0.3  /  DBili  x   /  AST  14  /  ALT  13  /  AlkPhos  72    TPro  6.3  /  Alb  2.9<L>  /  TBili  0.3  /  DBili  x   /  AST  13  /  ALT  17  /  AlkPhos  77      LIVER FUNCTIONS - ( 28 Mar 2021 07:40 )  Alb: 3.1 g/dL / Pro: 6.4 g/dL / ALK PHOS: 72 U/L / ALT: 13 U/L / AST: 14 U/L / GGT: x           Activated Partial Thromboplastin Time: 145.6 sec ( @ 08:19)      CARDIAC MARKERS:          Serum Pro-Brain Natriuretic Peptide: 2847 pg/mL ( @ 18:34)  Serum Pro-Brain Natriuretic Peptide: 2107 pg/mL ( @ 14:37)       @ 17:02  Cholesterol, Serum - 198  Direct LDL- --  HDL Cholesterol, Serum- 63  Triglycerides, Serum- 89      Thyroid Stimulating Hormone, Serum: 1.77 uIU/mL ( @ 14:38)        CAPILLARY BLOOD GLUCOSE      POCT Blood Glucose.: 107 mg/dL (28 Mar 2021 08:01)      < from: Cardiac Cath Lab - Adult (21 @ 16:17) >  HEMODYNAMICS: There is moderate to severe pulmonary hypertension.  VENTRICLES: No left ventriculogram was performed.  CORONARY VESSELS: The coronary circulation is right dominant.LM:   --  Mid left main: There was a discrete 20 % stenosis.  LAD:--  Proximal LAD: There was a tubular 80 % stenosis. There was TIMIgrade 3 flow through the vessel (brisk flow).  --  Mid LAD: There was a diffuse 60 % stenosis. There was BASIL grade 3 flowthrough the vessel (brisk flow).  CX:   --  Circumflex: The vessel was small sized.  --  Proximal circumflex: There was a tubular 30 % stenosis.  --  Mid circumflex: There was a diffuse 70 % stenosis.  RCA:   --  Proximal RCA: There was a tubular 75 % stenosis in the distalthird of the vessel segment. There was BASIL grade 3 flow through the  vessel (brisk flow).  --  RPDA: The vessel was small sized. There was a diffuse 70 % stenosis inthe proximal third of the vessel segment. There was BASIL grade 3 flowthrough the vessel (brisk flow).  --  RPLS: There was a discrete 60 % stenosis at the ostium of the vesselsegment. There was BASIL grade 3 flow through the vessel (brisk flow). In asecond lesion, there was a discrete 80 % stenosis in the distal third ofthe vessel segment, just before RPL1.There was BASIL grade 3 flow through  the vessel (brisk flow).  --  RPL1: The vessel was small sized. Angiography showed severeatherosclerosis.  COMPLICATIONS: There were no complications.  DIAGNOSTIC RECOMMENDATIONS: Medical management is recommended. Recommendviability testing to guide CAD management. Recommend diuresis for elevated  LV filling pressure.  Prepared and signed by  Shady Marroquin M.D.  Signed 2021 17:34:00  HEMODYNAMIC TABLES  Pressures:  Baseline  Pressures:  - HR: 100  Pressures:  - Rhythm:  Pressures:  -- Aortic Pressure (S/D/M): 139/84/106  Pressures:  -- Pulmonary Artery (S/D/M): 56/30/41  Pressures:  -- Pulmonary Capillary Wedge: 33/40/33  Pressures:  -- Right Atrium (a/v/M): 16/15/12  Pressures:  -- Right Ventricle (s/edp): 58/13/--  O2 Sats:  Baseline  O2 Sats:  - HR: 100  O2 Sats:  - Rhythm:  O2 Sats:  -- AO: 12.8/91.2/15.88  O2 Sats:  -- AO: 13/94.3/16.67  O2 Sats:  -- PA: 12.9/61.1/10.72  Outputs:  Baseline  Outputs:  -- CALCULATIONS[age]  Outputs:  -- CALCULATIONS: Body Surface Area: 1.62  Outputs:  -- CALCULATIONS: Height in cm: 160.00  Outputs:  -- CALCULATIONS: Sex: Female  Outputs:  -- CALCULATIONS: Weight in k.20  Outputs:  -- OUTPUTS: CO by Garcia: 3.41  Outputs:  -- OUTPUTS: Garcia cardiac index: 2.10  Outputs:  -- OUTPUTS: O2 consumption: 203.04  Outputs:  -- OUTPUTS: Vo2 Indexed: 125.00  Outputs:  -- RESISTANCES: Left ventricular stroke work: 33.20  Outputs:  -- RESISTANCES: Left Ventricular Stroke Work index: 20.44  Outputs:  -- RESISTANCES: Pulmonary vascular index (dsc): 304.71  Outputs:  -- RESISTANCES: Pulmonary vascular index (Wood Units): 3.81  Outputs:  -- RESISTANCES: Pulmonary vascular resistance (dsc): 187.59  Outputs:  -- RESISTANCES: Pulmonary vascularresistance (Wood Units): 2.35  Outputs:  -- RESISTANCES: PVR_SVR Ratio: 0.09  Outputs:  -- RESISTANCES: Right ventricular stroke work: 13.19  Outputs:  -- RESISTANCES: Right ventricular stroke work index: 8.12  Outputs:  -- RESISTANCES: Systemic vascular index (dsc): 3580.34  Outputs:  -- RESISTANCES: Systemic vascular index (Wood Units): 44.77  Outputs:  -- RESISTANCES: Systemic vascular resistance (dsc): 2204.16  Outputs:  -- RESISTANCES: Systemic vascular resistance (Wood Units): 27.56  Outputs:  -- RESISTANCES: Total pulmonary index (dsc): 1561.64  Outputs:  -- RESISTANCES: Total pulmonary index (Wood Units): 19.53  Outputs:  -- RESISTANCES: Total pulmonary resistance (dsc): 961.39  Outputs:  -- RESISTANCES: Total pulmonary resistance (Wood Units): 12.02  Outputs:  -- RESISTANCES: Total vascular index (Wood Units): 50.48  Outputs:  -- RESISTANCES: Total vascular resistance (dsc): 2485.54  Outputs:  -- RESISTANCES: Total vascular resistance (Wood Units): 31.08  Outputs:  -- RESISTANCES: Total vascular resistance index (dsc): 4037.41  Outputs:  -- RESISTANCES: TPR_TVR Ratio: 0.39  Outputs:  -- SHUNTS: Pulmonary flow: 3.41  Outputs:  -- SHUNTS: Qp Indexed: 2.10  Outputs:  -- SHUNTS: Qs Indexed: 2.10  Outputs:  -- SHUNTS: Systemic flow: 3.41    < end of copied text >  echo:  < from: TTE with Doppler (w/Cont) (21 @ 18:14) >Ef 35-40%  1. Normal mitral valve. Minimal mitral regurgitation.2. Normal trileaflet aortic valve.3. Endocardial visualization enhanced with intravenousinjection of echo contrast (Definity). The mid to distal  septum, apex, distal anterior, distal inferior walls arehypokinetic. Moderat to severe left ventricular systolicdysfunction. Apical thrombus is seen.4. Normal right ventricularsize and function.  5. Normal pericardium with trace pericardial effusion.6. Bilateral pleural effusions.      Assessment and Plan:    49y/o F w/ h/o T2DM (diagnosed ) and Hypertension (diagnosed ) p/w worsening shortness of breath and chest pain for 1 wk on 3/23. Due to insurance issues, was not able to take home metformin. COVID 19 was tested 2 days before admission. At the ED, pt was found to be in DKA with glucose level in 600s. Endo was consulted and was placed on insulin gtt. She was admitted in CCU. Insulin drip  was transition to SQ. TTE. TTE 3/24 showed EF 35-40%,  moderate to severe LV systolic dysfunction, apical thrombus seen, and trace pericardial effusion, b/l pleural effusions. Pt was warranted for ischemic evaluation and underwent cath on 3/25. LHC/RHC 3/25 showed PCW 30, CO 3.94, CI 2.42, pLAD 80% stenosis, Mid LAD 60% stenosis, Mid circumflex 70%, RCA 75% stenosis, RPDA 70% stenosis, RPLS 60% stenosis at ostium. plan for viability study     Plan    Neuro:  - A&O x3  - no active issues    Resp:  - No active issues  - Satting well on RA    Cardio:  New onset acute heart systolic failure in the setting of CAD-   - proBNP 2170-   - TTE 3/24: EF 35-40%, normal mitral valve, minimal MR, normal trileaflet aortic valve, moderate to severe LV systolic dysfunction, apical thrombus seen, normal right ventricular size and function, normal pericardium and trace pericardial effusion, b/l pleural effusions  - Cath done 3/25 sever multiple vessel disease- Plan for viability on 3/28-3/29  -  continue metoprolol tartrate 25 mg bid, losartan 25 mg qd, aspirin and statin 20 mg qhs  - Will continue daily weight, strict I/O's, DASH diet      HTN  -  metoprolol tartrate 25 mg bid, losartan 25 mg qd, aspirin and statin  - Will monitor BP    LV thrombus  - TTE 3/24 noted apical thrombus  - Will continue heparin gtt ( can change to full anticoagulation dose/goal PTT ) since possible cath next week    Renal:  - Will monitor Cr on BMP    GI:  - Will continue DASH, vegetarian diet    Endo:  T2DM  - Takes metformin at home  - Presented with glucose 659, AG 18  - Endo consulted.  initialy on  insulin gtt,- transitioned to lantus 17 u and admelog 6 u premeals and low dose ISS on ce gap closed   - Will f/u endo recs    ID:  Leukocytosis likely reactive in the setting of DKA and HF  - Will continue to monitor qd CBC  - Will monitor off abx    Prophylaxis:  - DVT ppx: heparin gtt  - Pregnancy profile negative  - Will put in social work, nutrition, and diabetes education consult                        EKG:  Diagnostic testing:  cath:  echo:      Assessment and Plan:         Subjective/Objective: feeling better    Overnight event:no issues   Tele event:NSR    MEDICATIONS    aspirin enteric coated 81 milliGRAM(s) Oral daily  heparin   Injectable 4500 Unit(s) IV Push every 6 hours PRN  heparin   Injectable 2000 Unit(s) IV Push every 6 hours PRN  heparin  Infusion.  Unit(s)/Hr IV Continuous <Continuous>  losartan 25 milliGRAM(s) Oral daily  metoprolol tartrate 25 milliGRAM(s) Oral two times a day  torsemide 40 milliGRAM(s) Oral daily  atorvastatin 20 milliGRAM(s) Oral at bedtime  dextrose 40% Gel 15 Gram(s) Oral once  dextrose 50% Injectable 25 Gram(s) IV Push once  dextrose 50% Injectable 12.5 Gram(s) IV Push once  dextrose 50% Injectable 25 Gram(s) IV Push once  glucagon  Injectable 1 milliGRAM(s) IntraMuscular once  insulin glargine Injectable (LANTUS) 17 Unit(s) SubCutaneous at bedtime  insulin lispro (ADMELOG) corrective regimen sliding scale   SubCutaneous three times a day before meals  insulin lispro (ADMELOG) corrective regimen sliding scale   SubCutaneous at bedtime  insulin lispro Injectable (ADMELOG) 6 Unit(s) SubCutaneous three times a day before meals  chlorhexidine 4% Liquid 1 Application(s) Topical daily  dextrose 5%. 1000 milliLiter(s) IV Continuous <Continuous>  dextrose 5%. 1000 milliLiter(s) IV Continuous <Continuous>      REVIEW OF SYSTEMS    General: no fatigue/malaise, weight loss/gain.  Skin: no rashes.  Ophthalmologic: no blurred vision, no loss of vision. 	  ENT: no sore throat, rhinorrhea, sinus congestion.  Cardiovascular:no chest pain ,no palpitation,no dizziness,no diaphoresis,no edema  Respiratory: no SOB, cough or wheeze.  Gastrointestinal:  no N/V/D, no melena/hematemesis/hematochezia.  Genitourinary: no dysuria/hesitancy or hematuria.  Musculoskeletal: no myalgias or arthralgias.  Neurological: no changes in vision or hearing, no lightheadedness/dizziness, no syncope/near syncope	  Psychiatric: no unusual stress/anxiety    	    ICU Vital Signs Last 24 Hrs  T(C): 36.4 (28 Mar 2021 05:20), Max: 36.9 (27 Mar 2021 20:45)  T(F): 97.5 (28 Mar 2021 05:20), Max: 98.4 (27 Mar 2021 20:45)  HR: 77 (28 Mar 2021 05:20) (77 - 96)  BP: 98/68 (28 Mar 2021 05:20) (98/68 - 127/78)  RR: 18 (28 Mar 2021 05:20) (18 - 19)  SpO2: 98% (28 Mar 2021 05:20) (97% - 99%)      PHYSICAL EXAMINATION  Appearance: NAD, no distress  HEENT: Moist Mucous Membranes, Anicteric, PERRL, EOMI  Cardiovascular: Regular rate and rhythm, Normal S1 S2, No JVD, No murmurs  Respiratory: Lungs clear to auscultation. No rales, No rhonchi, No wheezing. No tenderness to palpation  Gastrointestinal:  Soft, Non-tender, + BS  Neurologic: Non-focal, A&Ox3  Skin: Warm and dry, No rashes, No ecchymosis, No cyanosis  Musculoskeletal: No clubbing, No cyanosis, No edema  Vascular: Peripheral pulses palpable 2+ bilaterally  Psychiatry: Mood & affect appropriate      	    		      I&O's Summary    27 Mar 2021 07:01  -  28 Mar 2021 07:00  --------------------------------------------------------  IN: 1103 mL / OUT: 1900 mL / NET: -797 mL    	 	  LABORATORY VALUES	 	                          13.8   7.96  )-----------( 346      ( 28 Mar 2021 07:40 )             43.2           138  |  102  |  11  ----------------------------<  85  3.8   |  25  |  0.59  27    137  |  100  |  14  ----------------------------<  160<H>  3.6   |  24  |  0.58    Ca    9.4      28 Mar 2021 07:40  Ca    9.4      27 Mar 2021 07:29  Phos  4.5       Phos  4.3       Mg     2.1     03-28  Mg     1.8         TPro  6.4  /  Alb  3.1<L>  /  TBili  0.3  /  DBili  x   /  AST  14  /  ALT  13  /  AlkPhos  72    TPro  6.3  /  Alb  2.9<L>  /  TBili  0.3  /  DBili  x   /  AST  13  /  ALT  17  /  AlkPhos  77      LIVER FUNCTIONS - ( 28 Mar 2021 07:40 )  Alb: 3.1 g/dL / Pro: 6.4 g/dL / ALK PHOS: 72 U/L / ALT: 13 U/L / AST: 14 U/L / GGT: x           Activated Partial Thromboplastin Time: 145.6 sec ( @ 08:19)      CARDIAC MARKERS:          Serum Pro-Brain Natriuretic Peptide: 2847 pg/mL ( @ 18:34)  Serum Pro-Brain Natriuretic Peptide: 2107 pg/mL ( @ 14:37)       @ 17:02  Cholesterol, Serum - 198  Direct LDL- --  HDL Cholesterol, Serum- 63  Triglycerides, Serum- 89      Thyroid Stimulating Hormone, Serum: 1.77 uIU/mL ( @ 14:38)        CAPILLARY BLOOD GLUCOSE      POCT Blood Glucose.: 107 mg/dL (28 Mar 2021 08:01)      < from: Cardiac Cath Lab - Adult (21 @ 16:17) >  HEMODYNAMICS: There is moderate to severe pulmonary hypertension.  VENTRICLES: No left ventriculogram was performed.  CORONARY VESSELS: The coronary circulation is right dominant.LM:   --  Mid left main: There was a discrete 20 % stenosis.  LAD:--  Proximal LAD: There was a tubular 80 % stenosis. There was TIMIgrade 3 flow through the vessel (brisk flow).  --  Mid LAD: There was a diffuse 60 % stenosis. There was BASIL grade 3 flowthrough the vessel (brisk flow).  CX:   --  Circumflex: The vessel was small sized.  --  Proximal circumflex: There was a tubular 30 % stenosis.  --  Mid circumflex: There was a diffuse 70 % stenosis.  RCA:   --  Proximal RCA: There was a tubular 75 % stenosis in the distalthird of the vessel segment. There was BASIL grade 3 flow through the  vessel (brisk flow).  --  RPDA: The vessel was small sized. There was a diffuse 70 % stenosis inthe proximal third of the vessel segment. There was BASIL grade 3 flowthrough the vessel (brisk flow).  --  RPLS: There was a discrete 60 % stenosis at the ostium of the vesselsegment. There was BASIL grade 3 flow through the vessel (brisk flow). In asecond lesion, there was a discrete 80 % stenosis in the distal third ofthe vessel segment, just before RPL1.There was BASIL grade 3 flow through  the vessel (brisk flow).  --  RPL1: The vessel was small sized. Angiography showed severeatherosclerosis.  COMPLICATIONS: There were no complications.  DIAGNOSTIC RECOMMENDATIONS: Medical management is recommended. Recommendviability testing to guide CAD management. Recommend diuresis for elevated  LV filling pressure.  Prepared and signed by  Shady Marroquin M.D.  Signed 2021 17:34:00  HEMODYNAMIC TABLES  Pressures:  Baseline  Pressures:  - HR: 100  Pressures:  - Rhythm:  Pressures:  -- Aortic Pressure (S/D/M): 139/84/106  Pressures:  -- Pulmonary Artery (S/D/M): 56/30/41  Pressures:  -- Pulmonary Capillary Wedge: 33/40/33  Pressures:  -- Right Atrium (a/v/M): 16/15/12  Pressures:  -- Right Ventricle (s/edp): 58/13/--  O2 Sats:  Baseline  O2 Sats:  - HR: 100  O2 Sats:  - Rhythm:  O2 Sats:  -- AO: 12.8/91.2/15.88  O2 Sats:  -- AO: 13/94.3/16.67  O2 Sats:  -- PA: 12.9/61.1/10.72  Outputs:  Baseline  Outputs:  -- CALCULATIONS[age]  Outputs:  -- CALCULATIONS: Body Surface Area: 1.62  Outputs:  -- CALCULATIONS: Height in cm: 160.00  Outputs:  -- CALCULATIONS: Sex: Female  Outputs:  -- CALCULATIONS: Weight in k.20  Outputs:  -- OUTPUTS: CO by Garcia: 3.41  Outputs:  -- OUTPUTS: Garcia cardiac index: 2.10  Outputs:  -- OUTPUTS: O2 consumption: 203.04  Outputs:  -- OUTPUTS: Vo2 Indexed: 125.00  Outputs:  -- RESISTANCES: Left ventricular stroke work: 33.20  Outputs:  -- RESISTANCES: Left Ventricular Stroke Work index: 20.44  Outputs:  -- RESISTANCES: Pulmonary vascular index (dsc): 304.71  Outputs:  -- RESISTANCES: Pulmonary vascular index (Wood Units): 3.81  Outputs:  -- RESISTANCES: Pulmonary vascular resistance (dsc): 187.59  Outputs:  -- RESISTANCES: Pulmonary vascularresistance (Wood Units): 2.35  Outputs:  -- RESISTANCES: PVR_SVR Ratio: 0.09  Outputs:  -- RESISTANCES: Right ventricular stroke work: 13.19  Outputs:  -- RESISTANCES: Right ventricular stroke work index: 8.12  Outputs:  -- RESISTANCES: Systemic vascular index (dsc): 3580.34  Outputs:  -- RESISTANCES: Systemic vascular index (Wood Units): 44.77  Outputs:  -- RESISTANCES: Systemic vascular resistance (dsc): 2204.16  Outputs:  -- RESISTANCES: Systemic vascular resistance (Wood Units): 27.56  Outputs:  -- RESISTANCES: Total pulmonary index (dsc): 1561.64  Outputs:  -- RESISTANCES: Total pulmonary index (Wood Units): 19.53  Outputs:  -- RESISTANCES: Total pulmonary resistance (dsc): 961.39  Outputs:  -- RESISTANCES: Total pulmonary resistance (Wood Units): 12.02  Outputs:  -- RESISTANCES: Total vascular index (Wood Units): 50.48  Outputs:  -- RESISTANCES: Total vascular resistance (dsc): 2485.54  Outputs:  -- RESISTANCES: Total vascular resistance (Wood Units): 31.08  Outputs:  -- RESISTANCES: Total vascular resistance index (dsc): 4037.41  Outputs:  -- RESISTANCES: TPR_TVR Ratio: 0.39  Outputs:  -- SHUNTS: Pulmonary flow: 3.41  Outputs:  -- SHUNTS: Qp Indexed: 2.10  Outputs:  -- SHUNTS: Qs Indexed: 2.10  Outputs:  -- SHUNTS: Systemic flow: 3.41    < end of copied text >  echo:  < from: TTE with Doppler (w/Cont) (21 @ 18:14) >Ef 35-40%  1. Normal mitral valve. Minimal mitral regurgitation.2. Normal trileaflet aortic valve.3. Endocardial visualization enhanced with intravenousinjection of echo contrast (Definity). The mid to distal  septum, apex, distal anterior, distal inferior walls arehypokinetic. Moderat to severe left ventricular systolicdysfunction. Apical thrombus is seen.4. Normal right ventricularsize and function.  5. Normal pericardium with trace pericardial effusion.6. Bilateral pleural effusions.      Assessment and Plan:    51y/o F w/ h/o T2DM (diagnosed ) and Hypertension (diagnosed ) p/w worsening shortness of breath and chest pain for 1 wk on 3/23. Due to insurance issues, was not able to take home metformin. COVID 19 was tested 2 days before admission. At the ED, pt was found to be in DKA with glucose level in 600s. Endo was consulted and was placed on insulin gtt. She was admitted in CCU. Insulin drip  was transition to SQ. TTE. TTE 3/24 showed EF 35-40%,  moderate to severe LV systolic dysfunction, apical thrombus seen, and trace pericardial effusion, b/l pleural effusions. Pt was warranted for ischemic evaluation and underwent cath on 3/25. LHC/RHC 3/25 showed PCW 30, CO 3.94, CI 2.42, pLAD 80% stenosis, Mid LAD 60% stenosis, Mid circumflex 70%, RCA 75% stenosis, RPDA 70% stenosis, RPLS 60% stenosis at ostium. plan for viability study     Plan    Neuro:  - A&O x3  - no active issues    Resp:  - No active issues  - Satting well on RA    Cardio:  New onset acute heart systolic failure in the setting of CAD-   - proBNP 2170-   - TTE 3/24: EF 35-40%, normal mitral valve, minimal MR, normal trileaflet aortic valve, moderate to severe LV systolic dysfunction, apical thrombus seen, normal right ventricular size and function, normal pericardium and trace pericardial effusion, b/l pleural effusions  - Cath done 3/25 sever multiple vessel disease- Plan for viability on 3/28-3/29  -  continue metoprolol tartrate 25 mg bid, losartan 25 mg qd, aspirin and statin 20 mg qhs  - Will continue daily weight, strict I/O's, DASH diet      HTN  -  metoprolol tartrate 25 mg bid, losartan 25 mg qd, aspirin and statin  - Will monitor BP    LV thrombus  - TTE 3/24 noted apical thrombus  - Will continue heparin gtt    Renal:  - Will monitor Cr on BMP    GI:  - Will continue DASH, vegetarian diet    Endo:  T2DM  - Takes metformin at home  - Presented with glucose 659, AG 18  - Endo consulted.  initialy on  insulin gtt,- transitioned to lantus 17 u and admelog 6 u premeals and low dose ISS on ce gap closed   - Will f/u endo recs    ID:  Leukocytosis likely reactive in the setting of DKA and HF  - Will continue to monitor qd CBC  - Will monitor off abx    Prophylaxis:  - DVT ppx: heparin gtt  - Pregnancy profile negative  - Will put in social work, nutrition, and diabetes education consult                        EKG:  Diagnostic testing:  cath:  echo:      Assessment and Plan:         Subjective/Objective: feeling better    Overnight event:no issues   Tele event:NSR    MEDICATIONS    aspirin enteric coated 81 milliGRAM(s) Oral daily  heparin   Injectable 4500 Unit(s) IV Push every 6 hours PRN  heparin   Injectable 2000 Unit(s) IV Push every 6 hours PRN  heparin  Infusion.  Unit(s)/Hr IV Continuous <Continuous>  losartan 25 milliGRAM(s) Oral daily  metoprolol tartrate 25 milliGRAM(s) Oral two times a day  torsemide 40 milliGRAM(s) Oral daily  atorvastatin 20 milliGRAM(s) Oral at bedtime  dextrose 40% Gel 15 Gram(s) Oral once  dextrose 50% Injectable 25 Gram(s) IV Push once  dextrose 50% Injectable 12.5 Gram(s) IV Push once  dextrose 50% Injectable 25 Gram(s) IV Push once  glucagon  Injectable 1 milliGRAM(s) IntraMuscular once  insulin glargine Injectable (LANTUS) 17 Unit(s) SubCutaneous at bedtime  insulin lispro (ADMELOG) corrective regimen sliding scale   SubCutaneous three times a day before meals  insulin lispro (ADMELOG) corrective regimen sliding scale   SubCutaneous at bedtime  insulin lispro Injectable (ADMELOG) 6 Unit(s) SubCutaneous three times a day before meals  chlorhexidine 4% Liquid 1 Application(s) Topical daily  dextrose 5%. 1000 milliLiter(s) IV Continuous <Continuous>  dextrose 5%. 1000 milliLiter(s) IV Continuous <Continuous>      REVIEW OF SYSTEMS    General: no fatigue/malaise, weight loss/gain.  Skin: no rashes.  Ophthalmologic: no blurred vision, no loss of vision. 	  ENT: no sore throat, rhinorrhea, sinus congestion.  Cardiovascular:no chest pain ,no palpitation,no dizziness,no diaphoresis,no edema  Respiratory: no SOB, cough or wheeze.  Gastrointestinal:  no N/V/D, no melena/hematemesis/hematochezia.  Genitourinary: no dysuria/hesitancy or hematuria.  Musculoskeletal: no myalgias or arthralgias.  Neurological: no changes in vision or hearing, no lightheadedness/dizziness, no syncope/near syncope	  Psychiatric: no unusual stress/anxiety    	    ICU Vital Signs Last 24 Hrs  T(C): 36.4 (28 Mar 2021 05:20), Max: 36.9 (27 Mar 2021 20:45)  T(F): 97.5 (28 Mar 2021 05:20), Max: 98.4 (27 Mar 2021 20:45)  HR: 77 (28 Mar 2021 05:20) (77 - 96)  BP: 98/68 (28 Mar 2021 05:20) (98/68 - 127/78)  RR: 18 (28 Mar 2021 05:20) (18 - 19)  SpO2: 98% (28 Mar 2021 05:20) (97% - 99%)      PHYSICAL EXAMINATION  Appearance: NAD, no distress  HEENT: Moist Mucous Membranes, Anicteric, PERRL, EOMI  Cardiovascular: Regular rate and rhythm, Normal S1 S2, No JVD, No murmurs  Respiratory: Lungs clear to auscultation. No rales, No rhonchi, No wheezing. No tenderness to palpation  Gastrointestinal:  Soft, Non-tender, + BS  Neurologic: Non-focal, A&Ox3  Skin: Warm and dry, No rashes, No ecchymosis, No cyanosis  Musculoskeletal: No clubbing, No cyanosis, No edema  Vascular: Peripheral pulses palpable 2+ bilaterally  Psychiatry: Mood & affect appropriate      	    		      I&O's Summary    27 Mar 2021 07:01  -  28 Mar 2021 07:00  --------------------------------------------------------  IN: 1103 mL / OUT: 1900 mL / NET: -797 mL    	 	  LABORATORY VALUES	 	                          13.8   7.96  )-----------( 346      ( 28 Mar 2021 07:40 )             43.2           138  |  102  |  11  ----------------------------<  85  3.8   |  25  |  0.59  27    137  |  100  |  14  ----------------------------<  160<H>  3.6   |  24  |  0.58    Ca    9.4      28 Mar 2021 07:40  Ca    9.4      27 Mar 2021 07:29  Phos  4.5       Phos  4.3       Mg     2.1     03-28  Mg     1.8         TPro  6.4  /  Alb  3.1<L>  /  TBili  0.3  /  DBili  x   /  AST  14  /  ALT  13  /  AlkPhos  72    TPro  6.3  /  Alb  2.9<L>  /  TBili  0.3  /  DBili  x   /  AST  13  /  ALT  17  /  AlkPhos  77      LIVER FUNCTIONS - ( 28 Mar 2021 07:40 )  Alb: 3.1 g/dL / Pro: 6.4 g/dL / ALK PHOS: 72 U/L / ALT: 13 U/L / AST: 14 U/L / GGT: x           Activated Partial Thromboplastin Time: 145.6 sec ( @ 08:19)      CARDIAC MARKERS:          Serum Pro-Brain Natriuretic Peptide: 2847 pg/mL ( @ 18:34)  Serum Pro-Brain Natriuretic Peptide: 2107 pg/mL ( @ 14:37)       @ 17:02  Cholesterol, Serum - 198  Direct LDL- --  HDL Cholesterol, Serum- 63  Triglycerides, Serum- 89      Thyroid Stimulating Hormone, Serum: 1.77 uIU/mL ( @ 14:38)        CAPILLARY BLOOD GLUCOSE      POCT Blood Glucose.: 107 mg/dL (28 Mar 2021 08:01)      < from: Cardiac Cath Lab - Adult (21 @ 16:17) >  HEMODYNAMICS: There is moderate to severe pulmonary hypertension.  VENTRICLES: No left ventriculogram was performed.  CORONARY VESSELS: The coronary circulation is right dominant.LM:   --  Mid left main: There was a discrete 20 % stenosis.  LAD:--  Proximal LAD: There was a tubular 80 % stenosis. There was TIMIgrade 3 flow through the vessel (brisk flow).  --  Mid LAD: There was a diffuse 60 % stenosis. There was BASIL grade 3 flowthrough the vessel (brisk flow).  CX:   --  Circumflex: The vessel was small sized.  --  Proximal circumflex: There was a tubular 30 % stenosis.  --  Mid circumflex: There was a diffuse 70 % stenosis.  RCA:   --  Proximal RCA: There was a tubular 75 % stenosis in the distalthird of the vessel segment. There was BASIL grade 3 flow through the  vessel (brisk flow).  --  RPDA: The vessel was small sized. There was a diffuse 70 % stenosis inthe proximal third of the vessel segment. There was BASIL grade 3 flowthrough the vessel (brisk flow).  --  RPLS: There was a discrete 60 % stenosis at the ostium of the vesselsegment. There was BASIL grade 3 flow through the vessel (brisk flow). In asecond lesion, there was a discrete 80 % stenosis in the distal third ofthe vessel segment, just before RPL1.There was BASIL grade 3 flow through  the vessel (brisk flow).  --  RPL1: The vessel was small sized. Angiography showed severeatherosclerosis.  COMPLICATIONS: There were no complications.  DIAGNOSTIC RECOMMENDATIONS: Medical management is recommended. Recommendviability testing to guide CAD management. Recommend diuresis for elevated  LV filling pressure.  Prepared and signed by  Shady Marroquin M.D.  Signed 2021 17:34:00  HEMODYNAMIC TABLES  Pressures:  Baseline  Pressures:  - HR: 100  Pressures:  - Rhythm:  Pressures:  -- Aortic Pressure (S/D/M): 139/84/106  Pressures:  -- Pulmonary Artery (S/D/M): 56/30/41  Pressures:  -- Pulmonary Capillary Wedge: 33/40/33  Pressures:  -- Right Atrium (a/v/M): 16/15/12  Pressures:  -- Right Ventricle (s/edp): 58/13/--  O2 Sats:  Baseline  O2 Sats:  - HR: 100  O2 Sats:  - Rhythm:  O2 Sats:  -- AO: 12.8/91.2/15.88  O2 Sats:  -- AO: 13/94.3/16.67  O2 Sats:  -- PA: 12.9/61.1/10.72  Outputs:  Baseline  Outputs:  -- CALCULATIONS[age]  Outputs:  -- CALCULATIONS: Body Surface Area: 1.62  Outputs:  -- CALCULATIONS: Height in cm: 160.00  Outputs:  -- CALCULATIONS: Sex: Female  Outputs:  -- CALCULATIONS: Weight in k.20  Outputs:  -- OUTPUTS: CO by Garcia: 3.41  Outputs:  -- OUTPUTS: Garcia cardiac index: 2.10  Outputs:  -- OUTPUTS: O2 consumption: 203.04  Outputs:  -- OUTPUTS: Vo2 Indexed: 125.00  Outputs:  -- RESISTANCES: Left ventricular stroke work: 33.20  Outputs:  -- RESISTANCES: Left Ventricular Stroke Work index: 20.44  Outputs:  -- RESISTANCES: Pulmonary vascular index (dsc): 304.71  Outputs:  -- RESISTANCES: Pulmonary vascular index (Wood Units): 3.81  Outputs:  -- RESISTANCES: Pulmonary vascular resistance (dsc): 187.59  Outputs:  -- RESISTANCES: Pulmonary vascularresistance (Wood Units): 2.35  Outputs:  -- RESISTANCES: PVR_SVR Ratio: 0.09  Outputs:  -- RESISTANCES: Right ventricular stroke work: 13.19  Outputs:  -- RESISTANCES: Right ventricular stroke work index: 8.12  Outputs:  -- RESISTANCES: Systemic vascular index (dsc): 3580.34  Outputs:  -- RESISTANCES: Systemic vascular index (Wood Units): 44.77  Outputs:  -- RESISTANCES: Systemic vascular resistance (dsc): 2204.16  Outputs:  -- RESISTANCES: Systemic vascular resistance (Wood Units): 27.56  Outputs:  -- RESISTANCES: Total pulmonary index (dsc): 1561.64  Outputs:  -- RESISTANCES: Total pulmonary index (Wood Units): 19.53  Outputs:  -- RESISTANCES: Total pulmonary resistance (dsc): 961.39  Outputs:  -- RESISTANCES: Total pulmonary resistance (Wood Units): 12.02  Outputs:  -- RESISTANCES: Total vascular index (Wood Units): 50.48  Outputs:  -- RESISTANCES: Total vascular resistance (dsc): 2485.54  Outputs:  -- RESISTANCES: Total vascular resistance (Wood Units): 31.08  Outputs:  -- RESISTANCES: Total vascular resistance index (dsc): 4037.41  Outputs:  -- RESISTANCES: TPR_TVR Ratio: 0.39  Outputs:  -- SHUNTS: Pulmonary flow: 3.41  Outputs:  -- SHUNTS: Qp Indexed: 2.10  Outputs:  -- SHUNTS: Qs Indexed: 2.10  Outputs:  -- SHUNTS: Systemic flow: 3.41    < end of copied text >  echo:  < from: TTE with Doppler (w/Cont) (21 @ 18:14) >Ef 35-40%  1. Normal mitral valve. Minimal mitral regurgitation.2. Normal trileaflet aortic valve.3. Endocardial visualization enhanced with intravenousinjection of echo contrast (Definity). The mid to distal  septum, apex, distal anterior, distal inferior walls arehypokinetic. Moderat to severe left ventricular systolicdysfunction. Apical thrombus is seen.4. Normal right ventricularsize and function.  5. Normal pericardium with trace pericardial effusion.6. Bilateral pleural effusions.      Assessment and Plan:    51y/o F w/ h/o T2DM (diagnosed ) and Hypertension (diagnosed ) p/w worsening shortness of breath and chest pain for 1 wk on 3/23. Due to insurance issues, was not able to take home metformin. COVID 19 was tested 2 days before admission. At the ED, pt was found to be in DKA with glucose level in 600s. Endo was consulted and was placed on insulin gtt. She was admitted in CCU. Insulin drip  was transition to SQ. TTE. TTE 3/24 showed EF 35-40%,  moderate to severe LV systolic dysfunction, apical thrombus seen, and trace pericardial effusion, b/l pleural effusions. Pt was warranted for ischemic evaluation and underwent cath on 3/25. LHC/RHC 3/25 showed PCW 30, CO 3.94, CI 2.42, pLAD 80% stenosis, Mid LAD 60% stenosis, Mid circumflex 70%, RCA 75% stenosis, RPDA 70% stenosis, RPLS 60% stenosis at ostium. plan for viability study     Plan    Neuro:  - A&O x3  - no active issues    Resp:  - No active issues  - Satting well on RA    Cardio:  New onset acute heart systolic failure in the setting of CAD-   - proBNP 2170-   - TTE 3/24: EF 35-40%, normal mitral valve, minimal MR, normal trileaflet aortic valve, moderate to severe LV systolic dysfunction, apical thrombus seen, normal right ventricular size and function, normal pericardium and trace pericardial effusion, b/l pleural effusions  - Cath done 3/25 severe multiple vessel disease- Plan for viability on 3/28-3/29  - continue metoprolol tartrate 25 mg bid, losartan 25 mg qd, aspirin and statin 20 mg qhs  - Will continue daily weight, strict I/O's, DASH diet  - needs CABG evaluation    HTN  -  metoprolol tartrate 25 mg bid, losartan 25 mg qd, aspirin and statin  - Will monitor BP    LV thrombus  - TTE 3/24 noted apical thrombus  - Will continue heparin gtt    Renal:  - Will monitor Cr on BMP    GI:  - Will continue DASH, vegetarian diet    Endo:  T2DM  - Takes metformin at home  - Presented with glucose 659, AG 18  - Endo consulted.  initialy on  insulin gtt,- transitioned to lantus 17 u and admelog 6 u premeals and low dose ISS on ce gap closed   - Will f/u endo recs    ID:  Leukocytosis likely reactive in the setting of DKA and HF  - Will continue to monitor qd CBC  - Will monitor off abx    Prophylaxis:  - DVT ppx: heparin gtt  - Pregnancy profile negative  - Will put in social work, nutrition, and diabetes education consult                        EKG:  Diagnostic testing:  cath:  echo:      Assessment and Plan:

## 2021-03-29 ENCOUNTER — TRANSCRIPTION ENCOUNTER (OUTPATIENT)
Age: 51
End: 2021-03-29

## 2021-03-29 ENCOUNTER — INPATIENT (INPATIENT)
Facility: HOSPITAL | Age: 51
LOS: 10 days | Discharge: HOME | DRG: 236 | End: 2021-04-09
Attending: THORACIC SURGERY (CARDIOTHORACIC VASCULAR SURGERY) | Admitting: THORACIC SURGERY (CARDIOTHORACIC VASCULAR SURGERY)
Payer: MEDICAID

## 2021-03-29 VITALS
OXYGEN SATURATION: 98 % | DIASTOLIC BLOOD PRESSURE: 76 MMHG | SYSTOLIC BLOOD PRESSURE: 110 MMHG | WEIGHT: 122.36 LBS | HEART RATE: 84 BPM | RESPIRATION RATE: 18 BRPM | TEMPERATURE: 98 F

## 2021-03-29 VITALS
DIASTOLIC BLOOD PRESSURE: 61 MMHG | TEMPERATURE: 99 F | OXYGEN SATURATION: 99 % | HEART RATE: 83 BPM | RESPIRATION RATE: 18 BRPM | SYSTOLIC BLOOD PRESSURE: 102 MMHG

## 2021-03-29 DIAGNOSIS — I51.3 INTRACARDIAC THROMBOSIS, NOT ELSEWHERE CLASSIFIED: ICD-10-CM

## 2021-03-29 DIAGNOSIS — I25.10 ATHEROSCLEROTIC HEART DISEASE OF NATIVE CORONARY ARTERY WITHOUT ANGINA PECTORIS: ICD-10-CM

## 2021-03-29 DIAGNOSIS — E11.9 TYPE 2 DIABETES MELLITUS WITHOUT COMPLICATIONS: ICD-10-CM

## 2021-03-29 DIAGNOSIS — Z98.891 HISTORY OF UTERINE SCAR FROM PREVIOUS SURGERY: Chronic | ICD-10-CM

## 2021-03-29 LAB
ALBUMIN SERPL ELPH-MCNC: 3.3 G/DL — SIGNIFICANT CHANGE UP (ref 3.3–5)
ALP SERPL-CCNC: 73 U/L — SIGNIFICANT CHANGE UP (ref 40–120)
ALT FLD-CCNC: 13 U/L — SIGNIFICANT CHANGE UP (ref 4–33)
ANION GAP SERPL CALC-SCNC: 10 MMOL/L — SIGNIFICANT CHANGE UP (ref 7–14)
AST SERPL-CCNC: 12 U/L — SIGNIFICANT CHANGE UP (ref 4–32)
BASOPHILS # BLD AUTO: 0.06 K/UL — SIGNIFICANT CHANGE UP (ref 0–0.2)
BASOPHILS NFR BLD AUTO: 0.7 % — SIGNIFICANT CHANGE UP (ref 0–2)
BILIRUB SERPL-MCNC: 0.3 MG/DL — SIGNIFICANT CHANGE UP (ref 0.2–1.2)
BUN SERPL-MCNC: 11 MG/DL — SIGNIFICANT CHANGE UP (ref 7–23)
CALCIUM SERPL-MCNC: 9.7 MG/DL — SIGNIFICANT CHANGE UP (ref 8.4–10.5)
CHLORIDE SERPL-SCNC: 99 MMOL/L — SIGNIFICANT CHANGE UP (ref 98–107)
CO2 SERPL-SCNC: 26 MMOL/L — SIGNIFICANT CHANGE UP (ref 22–31)
CREAT SERPL-MCNC: 0.64 MG/DL — SIGNIFICANT CHANGE UP (ref 0.5–1.3)
EOSINOPHIL # BLD AUTO: 0.19 K/UL — SIGNIFICANT CHANGE UP (ref 0–0.5)
EOSINOPHIL NFR BLD AUTO: 2.2 % — SIGNIFICANT CHANGE UP (ref 0–6)
GLUCOSE BLDC GLUCOMTR-MCNC: 165 MG/DL — HIGH (ref 70–99)
GLUCOSE BLDC GLUCOMTR-MCNC: 185 MG/DL — HIGH (ref 70–99)
GLUCOSE BLDC GLUCOMTR-MCNC: 213 MG/DL — HIGH (ref 70–99)
GLUCOSE BLDC GLUCOMTR-MCNC: 295 MG/DL — HIGH (ref 70–99)
GLUCOSE SERPL-MCNC: 137 MG/DL — HIGH (ref 70–99)
HCT VFR BLD CALC: 45.1 % — HIGH (ref 34.5–45)
HGB BLD-MCNC: 14.1 G/DL — SIGNIFICANT CHANGE UP (ref 11.5–15.5)
IANC: 4.83 K/UL — SIGNIFICANT CHANGE UP (ref 1.5–8.5)
IMM GRANULOCYTES NFR BLD AUTO: 0.2 % — SIGNIFICANT CHANGE UP (ref 0–1.5)
LYMPHOCYTES # BLD AUTO: 2.79 K/UL — SIGNIFICANT CHANGE UP (ref 1–3.3)
LYMPHOCYTES # BLD AUTO: 32 % — SIGNIFICANT CHANGE UP (ref 13–44)
MAGNESIUM SERPL-MCNC: 1.9 MG/DL — SIGNIFICANT CHANGE UP (ref 1.6–2.6)
MCHC RBC-ENTMCNC: 25.1 PG — LOW (ref 27–34)
MCHC RBC-ENTMCNC: 31.3 GM/DL — LOW (ref 32–36)
MCV RBC AUTO: 80.4 FL — SIGNIFICANT CHANGE UP (ref 80–100)
MONOCYTES # BLD AUTO: 0.84 K/UL — SIGNIFICANT CHANGE UP (ref 0–0.9)
MONOCYTES NFR BLD AUTO: 9.6 % — SIGNIFICANT CHANGE UP (ref 2–14)
NEUTROPHILS # BLD AUTO: 4.83 K/UL — SIGNIFICANT CHANGE UP (ref 1.8–7.4)
NEUTROPHILS NFR BLD AUTO: 55.3 % — SIGNIFICANT CHANGE UP (ref 43–77)
NRBC # BLD: 0 /100 WBCS — SIGNIFICANT CHANGE UP
NRBC # FLD: 0 K/UL — SIGNIFICANT CHANGE UP
PHOSPHATE SERPL-MCNC: 4.7 MG/DL — HIGH (ref 2.5–4.5)
PLATELET # BLD AUTO: 334 K/UL — SIGNIFICANT CHANGE UP (ref 150–400)
POTASSIUM SERPL-MCNC: 3.7 MMOL/L — SIGNIFICANT CHANGE UP (ref 3.5–5.3)
POTASSIUM SERPL-SCNC: 3.7 MMOL/L — SIGNIFICANT CHANGE UP (ref 3.5–5.3)
PROT SERPL-MCNC: 6.7 G/DL — SIGNIFICANT CHANGE UP (ref 6–8.3)
RBC # BLD: 5.61 M/UL — HIGH (ref 3.8–5.2)
RBC # FLD: 13.5 % — SIGNIFICANT CHANGE UP (ref 10.3–14.5)
SODIUM SERPL-SCNC: 135 MMOL/L — SIGNIFICANT CHANGE UP (ref 135–145)
WBC # BLD: 8.73 K/UL — SIGNIFICANT CHANGE UP (ref 3.8–10.5)
WBC # FLD AUTO: 8.73 K/UL — SIGNIFICANT CHANGE UP (ref 3.8–10.5)

## 2021-03-29 PROCEDURE — 99232 SBSQ HOSP IP/OBS MODERATE 35: CPT

## 2021-03-29 PROCEDURE — 78452 HT MUSCLE IMAGE SPECT MULT: CPT | Mod: 26

## 2021-03-29 PROCEDURE — 99222 1ST HOSP IP/OBS MODERATE 55: CPT

## 2021-03-29 RX ORDER — METOPROLOL TARTRATE 50 MG
1 TABLET ORAL
Qty: 0 | Refills: 0 | DISCHARGE
Start: 2021-03-29

## 2021-03-29 RX ORDER — HEPARIN SODIUM 5000 [USP'U]/ML
900 INJECTION INTRAVENOUS; SUBCUTANEOUS
Qty: 0 | Refills: 0 | DISCHARGE
Start: 2021-03-29

## 2021-03-29 RX ORDER — INSULIN GLARGINE 100 [IU]/ML
17 INJECTION, SOLUTION SUBCUTANEOUS AT BEDTIME
Refills: 0 | Status: DISCONTINUED | OUTPATIENT
Start: 2021-03-29 | End: 2021-04-02

## 2021-03-29 RX ORDER — INSULIN LISPRO 100/ML
VIAL (ML) SUBCUTANEOUS AT BEDTIME
Refills: 0 | Status: DISCONTINUED | OUTPATIENT
Start: 2021-03-29 | End: 2021-04-02

## 2021-03-29 RX ORDER — INSULIN GLARGINE 100 [IU]/ML
17 INJECTION, SOLUTION SUBCUTANEOUS
Qty: 0 | Refills: 0 | DISCHARGE
Start: 2021-03-29

## 2021-03-29 RX ORDER — METOPROLOL TARTRATE 50 MG
25 TABLET ORAL
Refills: 0 | Status: DISCONTINUED | OUTPATIENT
Start: 2021-03-29 | End: 2021-04-02

## 2021-03-29 RX ORDER — INSULIN LISPRO 100/ML
VIAL (ML) SUBCUTANEOUS
Refills: 0 | Status: DISCONTINUED | OUTPATIENT
Start: 2021-03-29 | End: 2021-04-02

## 2021-03-29 RX ORDER — HEPARIN SODIUM 5000 [USP'U]/ML
900 INJECTION INTRAVENOUS; SUBCUTANEOUS
Qty: 25000 | Refills: 0 | Status: DISCONTINUED | OUTPATIENT
Start: 2021-03-29 | End: 2021-03-30

## 2021-03-29 RX ORDER — INSULIN LISPRO 100/ML
8 VIAL (ML) SUBCUTANEOUS
Refills: 0 | Status: DISCONTINUED | OUTPATIENT
Start: 2021-03-29 | End: 2021-03-29

## 2021-03-29 RX ORDER — INSULIN LISPRO 100/ML
8 VIAL (ML) SUBCUTANEOUS
Refills: 0 | Status: DISCONTINUED | OUTPATIENT
Start: 2021-03-29 | End: 2021-03-30

## 2021-03-29 RX ORDER — SODIUM CHLORIDE 9 MG/ML
3 INJECTION INTRAMUSCULAR; INTRAVENOUS; SUBCUTANEOUS EVERY 8 HOURS
Refills: 0 | Status: DISCONTINUED | OUTPATIENT
Start: 2021-03-29 | End: 2021-04-02

## 2021-03-29 RX ORDER — ATORVASTATIN CALCIUM 80 MG/1
20 TABLET, FILM COATED ORAL AT BEDTIME
Refills: 0 | Status: DISCONTINUED | OUTPATIENT
Start: 2021-03-29 | End: 2021-04-02

## 2021-03-29 RX ORDER — ASPIRIN/CALCIUM CARB/MAGNESIUM 324 MG
81 TABLET ORAL DAILY
Refills: 0 | Status: DISCONTINUED | OUTPATIENT
Start: 2021-03-29 | End: 2021-04-02

## 2021-03-29 RX ORDER — LOSARTAN POTASSIUM 100 MG/1
1 TABLET, FILM COATED ORAL
Qty: 0 | Refills: 0 | DISCHARGE
Start: 2021-03-29

## 2021-03-29 RX ORDER — ATORVASTATIN CALCIUM 80 MG/1
1 TABLET, FILM COATED ORAL
Qty: 0 | Refills: 0 | DISCHARGE
Start: 2021-03-29

## 2021-03-29 RX ORDER — INSULIN LISPRO 100/ML
8 VIAL (ML) SUBCUTANEOUS
Qty: 0 | Refills: 0 | DISCHARGE
Start: 2021-03-29

## 2021-03-29 RX ORDER — ASPIRIN/CALCIUM CARB/MAGNESIUM 324 MG
1 TABLET ORAL
Qty: 0 | Refills: 0 | DISCHARGE
Start: 2021-03-29

## 2021-03-29 RX ADMIN — Medication 3: at 12:38

## 2021-03-29 RX ADMIN — Medication 40 MILLIGRAM(S): at 05:35

## 2021-03-29 RX ADMIN — Medication 25 MILLIGRAM(S): at 17:09

## 2021-03-29 RX ADMIN — SODIUM CHLORIDE 3 MILLILITER(S): 9 INJECTION INTRAMUSCULAR; INTRAVENOUS; SUBCUTANEOUS at 23:32

## 2021-03-29 RX ADMIN — Medication 2: at 17:09

## 2021-03-29 RX ADMIN — HEPARIN SODIUM 900 UNIT(S)/HR: 5000 INJECTION INTRAVENOUS; SUBCUTANEOUS at 00:01

## 2021-03-29 RX ADMIN — Medication 81 MILLIGRAM(S): at 12:39

## 2021-03-29 RX ADMIN — Medication 6 UNIT(S): at 12:39

## 2021-03-29 RX ADMIN — LOSARTAN POTASSIUM 25 MILLIGRAM(S): 100 TABLET, FILM COATED ORAL at 05:35

## 2021-03-29 RX ADMIN — CHLORHEXIDINE GLUCONATE 1 APPLICATION(S): 213 SOLUTION TOPICAL at 12:27

## 2021-03-29 RX ADMIN — Medication 8 UNIT(S): at 17:10

## 2021-03-29 RX ADMIN — Medication 25 MILLIGRAM(S): at 05:35

## 2021-03-29 RX ADMIN — Medication 6 UNIT(S): at 08:00

## 2021-03-29 RX ADMIN — Medication 1: at 08:00

## 2021-03-29 NOTE — H&P ADULT - NSHPREVIEWOFSYSTEMS_GEN_ALL_CORE
REVIEW OF SYSTEMS:  CONSTITUTIONAL: No fever, weight loss, or fatigue  EYES: No eye pain, visual disturbances, or discharge  ENMT:  No difficulty hearing, tinnitus, vertigo; No sinus or throat pain  NECK: No pain or stiffness  RESPIRATORY: Has non productive cough, No wheezing, chills or hemoptysis; +shortness of breath  CARDIOVASCULAR: +chest pain, No palpitations, dizziness, or leg swelling  GASTROINTESTINAL: No abdominal or epigastric pain. No nausea, vomiting, or hematemesis; No diarrhea ,has No melena  GENITOURINARY: No dysuria, frequency, hematuria, or incontinence  NEUROLOGICAL: No headaches, memory loss, loss of strength, numbness, or tremors  SKIN: No itching, burning, rashes, or lesions   LYMPH NODES: No enlarged glands  ENDOCRINE: No heat or cold intolerance; No hair loss  MUSCULOSKELETAL: No joint pain or swelling; No muscle, back, or extremity pain  PSYCHIATRIC: No depression, anxiety, mood swings, or difficulty sleeping  HEME/LYMPH: No easy bruising, or bleeding gums  ALLERGY: No hives or eczema

## 2021-03-29 NOTE — DISCHARGE NOTE NURSING/CASE MANAGEMENT/SOCIAL WORK - PATIENT PORTAL LINK FT
You can access the FollowMyHealth Patient Portal offered by [facility] by registering at the following website: [email or web address]. By joining [employer]’s FollowMyHealth portal, you will also be able to view your health information using other applications (apps) compatible with our system.

## 2021-03-29 NOTE — PROGRESS NOTE ADULT - SUBJECTIVE AND OBJECTIVE BOX
Subjective/Objective: Patient feels well, OOB in room no difficulty, denies chest pain or SOB.     MEDICATIONS  (STANDING):  aspirin enteric coated 81 milliGRAM(s) Oral daily  atorvastatin 20 milliGRAM(s) Oral at bedtime  chlorhexidine 4% Liquid 1 Application(s) Topical daily  dextrose 40% Gel 15 Gram(s) Oral once  dextrose 5%. 1000 milliLiter(s) (50 mL/Hr) IV Continuous <Continuous>  dextrose 5%. 1000 milliLiter(s) (100 mL/Hr) IV Continuous <Continuous>  dextrose 50% Injectable 25 Gram(s) IV Push once  dextrose 50% Injectable 12.5 Gram(s) IV Push once  dextrose 50% Injectable 25 Gram(s) IV Push once  glucagon  Injectable 1 milliGRAM(s) IntraMuscular once  heparin  Infusion.  Unit(s)/Hr (11 mL/Hr) IV Continuous <Continuous>  insulin glargine Injectable (LANTUS) 17 Unit(s) SubCutaneous at bedtime  insulin lispro (ADMELOG) corrective regimen sliding scale   SubCutaneous three times a day before meals  insulin lispro (ADMELOG) corrective regimen sliding scale   SubCutaneous at bedtime  insulin lispro Injectable (ADMELOG) 6 Unit(s) SubCutaneous three times a day before meals  losartan 25 milliGRAM(s) Oral daily  metoprolol tartrate 25 milliGRAM(s) Oral two times a day  torsemide 40 milliGRAM(s) Oral daily    MEDICATIONS  (PRN):  heparin   Injectable 4500 Unit(s) IV Push every 6 hours PRN For aPTT less than 40  heparin   Injectable 2000 Unit(s) IV Push every 6 hours PRN For aPTT between 40 - 57          Vital Signs Last 24 Hrs  T(C): 36.7 (29 Mar 2021 05:34), Max: 36.8 (28 Mar 2021 11:46)  T(F): 98 (29 Mar 2021 05:34), Max: 98.3 (28 Mar 2021 21:23)  HR: 84 (29 Mar 2021 05:34) (80 - 92)  BP: 123/86 (29 Mar 2021 05:34) (100/67 - 130/86)  BP(mean): --  RR: 18 (29 Mar 2021 05:34) (17 - 18)  SpO2: 96% (29 Mar 2021 05:34) (96% - 98%)  I&O's Detail    28 Mar 2021 07:01  -  29 Mar 2021 07:00  --------------------------------------------------------  IN:    Heparin Infusion: 146 mL    Oral Fluid: 950 mL  Total IN: 1096 mL    OUT:    Voided (mL): 1250 mL  Total OUT: 1250 mL    Total NET: -154 mL    PHYSICAL EXAM  GEN: NAD, skin W & D  RESP: CTA B/L  CV: nl S1S2, no M/R/C  GI: soft, NT/ND  EXT: no C/C/E  NEURO: A & O X 3    EKG/ TELEM: NSR    LABS:                          14.1   8.73  )-----------( 334      ( 29 Mar 2021 06:36 )             45.1     PTT - ( 28 Mar 2021 23:30 )  PTT:86.7 sec  29 Mar 2021 06:36    135    |  99     |  11     ----------------------------<  137<H>  3.7     |  26     |  0.64     28 Mar 2021 07:40    138    |  102    |  11     ----------------------------<  85     3.8     |  25     |  0.59     Ca    9.7        29 Mar 2021 06:36  Ca    9.4        28 Mar 2021 07:40  Phos  4.7<H>     29 Mar 2021 06:36  Phos  4.5       28 Mar 2021 07:40  Mg     1.9       29 Mar 2021 06:36  Mg     2.1       28 Mar 2021 07:40    TPro  6.7    /  Alb  3.3    /  TBili  0.3    /  DBili  x      /  AST  12     /  ALT  13     /  AlkPhos  73     29 Mar 2021 06:36  TPro  6.4    /  Alb  3.1<L>  /  TBili  0.3    /  DBili  x      /  AST  14     /  ALT  13     /  AlkPhos  72     28 Mar 2021 07:40                         Pt. prefers I communicate in Chinese during PST visit today

## 2021-03-29 NOTE — PROGRESS NOTE ADULT - ASSESSMENT
50 year old woman with PMH HTN, HLD, uncontrolled DM2, p/w worsening shortness of breath and chest pain for 1 week, admitted for management of acute CHF as well as hyperglycemia the 600's. Consult called for management of uncontrolled DM2. A1c is 13.9%.     1. Uncontrolled type 2 diabetes mellitus with hyperglycemia  A1c 13.9%, goal < 7%, note initial labs more suggestive of HHS rather than DKA  At home on Metformin 1g BID; however stopped 2 weeks prior to admission    While inpatient:  Goal glucose 100-180 mg/dl  Fasting glucose stable, above goal prandially  Recommend to continue with Lantus 17 units SQ qHS   Increase Admelog to 8 units SQ TID before meals (Hold if NPO/not eating meal)  Continue Admelog LOW dose correctional scale before meals and low scale at bedtime  Consistent carbohydrate diet  Check BG before meals and bedtime     Discharge Plan:  Start basal insulin PEN - per team, Semglee PEN is covered. Dose TBD  PLUS: resume Metformin 1000 mg PO BID (Note, she does have CHF however, EF noted to 35-40% with normal renal function)  ADD: SGLT2 inhibitor: Steglatro 5 mg PO daily  Ensure she has supplies, including glucometer, glucose test strips, lancets, alcohol swabs and insulin PEN needles   She can follow up in the office if she wishes [facility] - [phone] - [address], [address] [place] [address].     2. Essential hypertension  BP target less than 130/80  Management per primary team/cardiology     3. Hyperlipidemia, unspecified hyperlipidemia type  LDL target less than 70    Continue Atorvastatin     Elizabeth Walsh  Nurse Practitioner  Division of Endocrinology & Diabetes  In house pager #[phone]/long range pager #[phone]    If before 9AM or after 6PM, or on weekends/holidays, please call endocrine answering service for assistance ([phone]).  For nonurgent matters email [email or web address] for assistance.

## 2021-03-29 NOTE — H&P ADULT - NSHPPHYSICALEXAM_GEN_ALL_CORE
General: WN/WD NAD  Neurology: A&Ox3, nonfocal, MOSQUEDA x 4  Head:  Normocephalic, atraumatic  ENT:  Mucosa moist, no ulcerations  Neck:  Supple, no sinuses or palpable masses  Lymphatic:  No palpable cervical, supraclavicular, axillary or inguinal adenopathy  Respiratory: CTA B/L  CV: RRR, S1S2, no murmur  Abdominal: Soft, NT, ND no palpable mass  MSK: No edema, + peripheral pulses, FROM all 4 extremity

## 2021-03-29 NOTE — PROGRESS NOTE ADULT - PROBLEM SELECTOR PLAN 3
- c/w Lipitor
- c/w Lipitor
Euvolemic at present, continue torsemide. HF followup as outpatient.
(2) assistive person

## 2021-03-29 NOTE — PROGRESS NOTE ADULT - PROBLEM SELECTOR PLAN 4
Better controlled at present. Continue diet, FS, Lantus, pre meal Humalog, and IHSS. Endocrine covering.

## 2021-03-29 NOTE — H&P ADULT - HISTORY OF PRESENT ILLNESS
49y/o Smooth Female with pmhx of T2DM (diagnosed 2005) and Hypertension (diagnosed 2005) p/w worsening shortness of breath and chest pain for 1 wk on 3/23. Due to insurance issues, was not able to take home metformin. Last 1 week she was unable to perform her daily routines due to sob with minimal exertion with associated cough and orthopnea requiring 2-3 pillow to sleep. At [facility] ED, pt was found to be in DKA with glucose level in 600s, proBNP 2107, trop 111 with ST depression in V5/V6. Endo was consulted and was placed on insulin gtt and gradually transitioned to SQ insulin. Pt was given Lasix IV push and admitted to CCU on bipap and nitro gtt. TTE done on 3/24 showed EF 35-40%, moderate to severe LV systolic dysfunction, apical thrombus seen, and trace pericardial effusion, and b/l pleural effusions. Pt was warranted for ischemic evaluation and underwent cath on 3/25. LHC/RHC 3/25 showed PCW 30, CO 3.94, CI 2.42, pLAD 80% stenosis, Mid LAD 60% stenosis, Mid circumflex 70%, RCA 75% stenosis, RPDA 70% stenosis, RPLS 60% stenosis at ostium.

## 2021-03-29 NOTE — PROGRESS NOTE ADULT - PROBLEM SELECTOR PROBLEM 1
Uncontrolled type 2 diabetes mellitus with hyperglycemia
Uncontrolled type 2 diabetes mellitus with hyperglycemia
CAD (coronary artery disease)
Uncontrolled type 2 diabetes mellitus with hyperglycemia

## 2021-03-29 NOTE — PROGRESS NOTE ADULT - PROBLEM SELECTOR PROBLEM 3
Hyperlipidemia, unspecified hyperlipidemia type
Hyperlipidemia, unspecified hyperlipidemia type
Acute heart failure, unspecified heart failure type
Hyperlipidemia, unspecified hyperlipidemia type

## 2021-03-29 NOTE — PROGRESS NOTE ADULT - ATTENDING COMMENTS
50 year old woman with known DM/HTN who has not taken meds in over a year  Went to PCP for shortness of breath and came in to ED with dyspnea/chest pain for a week  #Neuro- No active issues  #Pulm- Was on BIPAP last night but now on Nasal Cannula for supplemental O2  Continue to monitor  #CV- Acute on chronic systolic heart failure with likely LV thrombus  Was initially on Nitro gtt and Lasix  Bedside echo showed LV dysfunction with filling defect consistent with LV thrombus  Lasix gtt 5 mg/hr initially but stopped and now given 40 mg IV BID; will reassess  Net output 1.4  Start Losartan 25 mg Daily  Start Metoprolol later day (12.5 mg BID with goal to switch to succinate tomorrow if she tolerates)  Eventual ischemic eval ([facility])  #Endo- DKA- Insulin gtt started  Anion gap did improve so insulin gtt stopped and given Lantus 15u  Endo consult appreciated  #Renal-Cr stable, continue to monitor  #ID- no active issues  #DVT ppx- Heparin gtt started  #SW consult
50 year old woman with known DM/HTN who has not taken meds in over a year  Went to PCP for shortness of breath and came in to ED with dyspnea/chest pain for a week  #Neuro- No active issues  #Pulm- Was on BIPAP last night but now on Nasal Cannula for supplemental O2  Continue to monitor  #CV- Acute on chronic systolic heart failure with likely LV thrombus  Was initially on Nitro gtt and Lasix  Bedside echo showed LV dysfunction with filling defect consistent with LV thrombus  Lasix gtt 5 mg/hr initially but stopped and now given 40 mg IV BID; will reassess  Net output 1.4  Start Losartan 25 mg Daily  Start Metoprolol later day (12.5 mg BID with goal to switch to succinate tomorrow if she tolerates)  Eventual ischemic eval ([facility])  #Endo- DKA- Insulin gtt started  Anion gap did improve so insulin gtt stopped and given Lantus 15u  Endo consult appreciated  #Renal-Cr stable, continue to monitor  #ID- no active issues  #DVT ppx- Heparin gtt started  #SW consult
The patient is a 50-year-old woman with diabetes and hypertension who presented with worsening dyspnea and chest pain, found to be in DKA on admission. Echocardiography demonstrated new LV dysfunction with LV thrombus. Coronary angiography demonstrates severe 3-vessel coronary disease. Given her age, the extent of her coronary disease, and her low LVEF, revascularization by CABG may be considered. Nuclear study demonstrates myocardial viability in all territories. It is unclear on my review of the films, however, whether there are appropriate targets for grafting.     CV surgery will review the films. If the patient is felt to be a surgical candidate, she will require transfer to [facility]. If she is turned down for CABG, PCI will be planned on the proximal LAD and the proximal RCA. The distal vessels and the large OM do not appear appropriate for PCI at this time.     Jose Rafael Albarran MD  Cardiology  [phone]

## 2021-03-29 NOTE — H&P ADULT - PROBLEM SELECTOR PLAN 2
Continue with Adem 8U TID and Lantus 17U at bedtime   Endo consult   ACHS fs, Monitor glucose   Carb consistent Vegan Diet   Check A1c

## 2021-03-29 NOTE — PROGRESS NOTE ADULT - PROBLEM SELECTOR PLAN 2
- BP at goal  - c/w Losartan and Metoprolol
- BP at goal  - c/w Losartan and Metoprolol
Continues on Heparin drip at present. Will determine transition to po A/C once decision is made concerning intervention vs. medical management for CAD.

## 2021-03-29 NOTE — PROGRESS NOTE ADULT - PROBLEM SELECTOR PROBLEM 2
Essential hypertension
LV (left ventricular) mural thrombus

## 2021-03-29 NOTE — PROGRESS NOTE ADULT - PROBLEM SELECTOR PLAN 1
Continue ASA, lipitor, losartan, and metoprolol. Awaiting completion of viability study.
- A1c 13.9%, goal < 7%  - initial labs more suggestive of HHS rather than DKA  - increase Lantus to 17 units qhs, increase Admelog to 6 units before meals, and c/w low correction scale qac and qhs  - consistent carb diet  - check FS qac and qhs  - RD consult  - will follow  - start insulin pen teaching with patient   - for discharge: will most likely dc on basal insulin + oral agents (such as Metformin [ EF noted to 35-40% with normal renal function] with SGLT2 inhibitor such as Jardiance 10 mg daily if insulin requirements are relatively low. Send script for Jardiance 10 mg daily to assess insurance coverage. She can follow up in the office if she wishes - [phone] - [address], [address], [facility].
- A1c 13.9%, goal < 7%  - initial labs more suggestive of HHS rather than DKA  - BG now overall at goal   - c/w Lantus 17 units qhs, c/w Admelog 6 units before meals  - c/w low correction scale qac and qhs  - consistent carb diet  - check FS qac and qhs  - RD consult  - will follow  - start insulin pen teaching with patient   - for discharge: will most likely dc on basal insulin + oral agents (such as Metformin [ EF noted to 35-40% with normal renal function] with SGLT2 inhibitor such as Jardiance 10 mg daily if insulin requirements are relatively low. Send script for Jardiance 10 mg daily and Lantus pen 17 units daily to assess insurance coverage. She can follow up in the office if she wishes - [phone] - [address], [address], [facility].

## 2021-03-29 NOTE — PROGRESS NOTE ADULT - ASSESSMENT
50F with DM2, diagnosed 2005 and HTN presents with worsening SOB and chest pain X one week. On admission found to be in DKA which responded to treatment. ECHO done which revealed new LV dysfunction with + LV thrombus. Subsequent ischemia evaluation revealed severe triple vessel disease and patient is now awaiting completion of viability study to determine CABG vs. high risk PCI vs. medical management.

## 2021-03-29 NOTE — H&P ADULT - NSHPLABSRESULTS_GEN_ALL_CORE
CORONARY VESSELS: The coronary circulation is right dominant.  LM:   --  Mid left main: There was a discrete 20 % stenosis.  LAD:   --  Proximal LAD: There was a tubular 80 % stenosis. There was BASIL  grade 3 flow through the vessel (brisk flow).  --  Mid LAD: There was a diffuse 60 % stenosis. There was BASIL grade 3 flow  through the vessel (brisk flow).  CX:   --  Circumflex: The vessel was small sized.  --  Proximal circumflex: There was a tubular 30 % stenosis.  --  Mid circumflex: There was a diffuse 70 % stenosis.  RCA:   --  Proximal RCA: There was a tubular 75 % stenosis in the distal  third of the vessel segment. There was BASIL grade 3 flow through the  vessel (brisk flow).  --  RPDA: The vessel was small sized. There was a diffuse 70 % stenosis in  the proximal third of the vessel segment. There was BASIL grade 3 flow  through the vessel (brisk flow).  --  RPLS: There was a discrete 60 % stenosis at the ostium of the vessel  segment. There was BASIL grade 3 flow through the vessel (brisk flow). In a  second lesion, there was a discrete 80 % stenosis in the distal third of  the vessel segment, just before RPL1. There was BASIL grade 3 flow through  the vessel (brisk flow).  --  RPL1: The vessel was small sized. Angiography showed severe  atherosclerosis.  COMPLICATIONS: There were no complications.  DIAGNOSTIC RECOMMENDATIONS: Medical management is recommended. Recommend  viability testing to guide CAD management. Recommend diuresis for elevated  LV filling pressure.  Prepared and signed by  Shady Marroquin M.D.    < Cardiac Viability Study 3/29/21>  IMPRESSIONS: Abnormal Study  * Myocardial Perfusion SPECT results are abnormal.  * Review of raw data shows: The study is of good technical  quality, despite overlying attenuation from the breast.  * The left ventricle was normal in size. There is a small,  mild to moderate defect in apical wall and distal inferior  walls that are fixed on 24 hrs delay images, suggestive of  infarct with partial viability. The remaining LV segments  have normal initial uptake of tracer and are thus viable.  * Rest gated wall motion analysis was performed (LVEF = 28  %;LVEDV = 73 ml.), revealing markedly reduced  LV  function. There was focal apical akinesis, severe mid to  distal anterior hypokinesis and moderate to severe diffuse  hypokinesis in the remaining segments.

## 2021-03-29 NOTE — PROGRESS NOTE ADULT - SUBJECTIVE AND OBJECTIVE BOX
Chief Complaint: DM 2    History: Patient seen at bedside, eating lunch at time of visit and tolerating well. Patient reports she is feeling fine, denies n/v, denies s/s of hypoglycemia  Most recent BG elevated to 295 mg/dl  Reviewed outpatient planning with patient. She is agreeable to basal insulin + oral plan at MD - reports she has used insulin PENS in the past and feels comfortable with use. Was able to verbalize proper steps of insulin pen injection     MEDICATIONS  (STANDING):  aspirin enteric coated 81 milliGRAM(s) Oral daily  atorvastatin 20 milliGRAM(s) Oral at bedtime  chlorhexidine 4% Liquid 1 Application(s) Topical daily  dextrose 40% Gel 15 Gram(s) Oral once  dextrose 5%. 1000 milliLiter(s) (50 mL/Hr) IV Continuous <Continuous>  dextrose 5%. 1000 milliLiter(s) (100 mL/Hr) IV Continuous <Continuous>  dextrose 50% Injectable 25 Gram(s) IV Push once  dextrose 50% Injectable 12.5 Gram(s) IV Push once  dextrose 50% Injectable 25 Gram(s) IV Push once  glucagon  Injectable 1 milliGRAM(s) IntraMuscular once  heparin  Infusion.  Unit(s)/Hr (11 mL/Hr) IV Continuous <Continuous>  insulin glargine Injectable (LANTUS) 17 Unit(s) SubCutaneous at bedtime  insulin lispro (ADMELOG) corrective regimen sliding scale   SubCutaneous three times a day before meals  insulin lispro (ADMELOG) corrective regimen sliding scale   SubCutaneous at bedtime  insulin lispro Injectable (ADMELOG) 6 Unit(s) SubCutaneous three times a day before meals  losartan 25 milliGRAM(s) Oral daily  metoprolol tartrate 25 milliGRAM(s) Oral two times a day  torsemide 40 milliGRAM(s) Oral daily    MEDICATIONS  (PRN):  heparin   Injectable 4500 Unit(s) IV Push every 6 hours PRN For aPTT less than 40  heparin   Injectable 2000 Unit(s) IV Push every 6 hours PRN For aPTT between 40 - 57    No Known Allergies    Review of Systems:  HEENT: No pain  Cardiovascular: No chest pain  Respiratory: No SOB  GI: No nausea, vomiting    PHYSICAL EXAM:  VITALS: T(C): 36.3 (03-29-21 @ 12:10)  T(F): 97.4 (03-29-21 @ 12:10), Max: 98.3 (03-28-21 @ 21:23)  HR: 91 (03-29-21 @ 12:10) (80 - 92)  BP: 99/68 (03-29-21 @ 12:10) (99/68 - 130/86)  RR:  (17 - 18)  SpO2:  (96% - 100%)  Wt(kg): --  GENERAL: NAD  EYES: No proptosis, no lid lag, anicteric  HEENT:  Atraumatic, Normocephalic, moist mucous membranes  RESPIRATORY: unlabored respirations   PSYCH: Alert and oriented x 3, normal affect, normal mood    CAPILLARY BLOOD GLUCOSE    POCT Blood Glucose.: 295 mg/dL (29 Mar 2021 12:03)  POCT Blood Glucose.: 165 mg/dL (29 Mar 2021 07:25)  POCT Blood Glucose.: 174 mg/dL (28 Mar 2021 20:40)  POCT Blood Glucose.: 290 mg/dL (28 Mar 2021 17:04)  POCT Blood Glucose.: 302 mg/dL (28 Mar 2021 17:03)      03-29    135  |  99  |  11  ----------------------------<  137<H>  3.7   |  26  |  0.64    EGFR if : 121  EGFR if non : 105    Ca    9.7      03-29  Mg     1.9     03-29  Phos  4.7     03-29    TPro  6.7  /  Alb  3.3  /  TBili  0.3  /  DBili  x   /  AST  12  /  ALT  13  /  AlkPhos  73  03-29      Thyroid Function Tests:  03-23 @ 14:38 TSH 1.77 FreeT4 -- T3 -- Anti TPO -- Anti Thyroglobulin Ab -- TSI --      A1C with Estimated Average Glucose Result: 13.9 % (03-24-21 @ 00:08)  A1C with Estimated Average Glucose Result: 13.9 % (03-23-21 @ 15:35)    Diet, DASH/TLC:   Sodium & Cholesterol Restricted  Consistent Carbohydrate Evening Snack (CSTCHOSN) (03-25-21 @ 17:54)

## 2021-03-29 NOTE — H&P ADULT - PROBLEM SELECTOR PLAN 1
New onset acute heart systolic failure in the setting of CAD  proBNP 2170  TTE 3/24: EF 35-40%, normal mitral valve, minimal MR, normal trileaflet aortic valve, moderate to severe LV systolic dysfunction, apical thrombus seen, normal right ventricular size and function, normal pericardium and trace pericardial effusion, b/l pleural effusions  Cath done 3/25 severe multiple vessel disease  Poor targets- will evaluate as per CTS surgeon Dr. Baig   Continue metoprolol tartrate 25 mg bid, aspirin 81mg, statin 20 mg qhs  Losartan 25 mg qd- Held for renal optimization   Continue diuresis with torsemide 20 QD  Will continue daily weight, strict I/O's, DASH diet  Replete lytes New onset acute heart systolic failure in the setting of CAD  proBNP 2170  TTE 3/24: EF 35-40%, normal mitral valve, minimal MR, normal trileaflet aortic valve, moderate to severe LV systolic dysfunction, apical thrombus seen, normal right ventricular size and function, normal pericardium and trace pericardial effusion, b/l pleural effusions  Cath done 3/25 severe multiple vessel disease  Poor targets- will evaluate as per CTS surgeon Dr. Baig   3/29 - Cardiac Viability - partial viability- will review imaging   Continue metoprolol tartrate 25 mg bid, aspirin 81mg, statin 20 mg qhs  Losartan 25 mg qd- Held for renal optimization   Continue diuresis with torsemide 20 QD  Will continue daily weight, strict I/O's, DASH diet  Replete lytes

## 2021-03-29 NOTE — CHART NOTE - NSCHARTNOTEFT_GEN_A_CORE
Cardiac viability study completed with viability noted in all segments. Discussed with Dr. Marroquin, interventionalist and he requested CTS evaluation for possible CABG. Case presented to CTS at [facility] and patient accepted for evaluation. All discussed with patient and her . Explained that it could ultimately be determined that she is not a surgical candidate but there is still the possibility of PCI to pLAD and pRCA per Dr. Marroquin. They are agreeable to transfer to [facility] for evaluation and CTS will make appropriate arrangements for this. Tele ACP informed of plan.   Will continue all current therapies/ meds at this time, including Heparin drip for LV thrombus. Cardiac viability study completed with viability noted in all segments. Discussed with Dr. Marroquin, interventionalist and he requested CTS evaluation for possible CABG. Case presented to CTS at [facility] and patient accepted for evaluation ( Dr. Richardson).  All discussed with patient and her . Explained that it could ultimately be determined that she is not a surgical candidate but there is still the possibility of PCI to pLAD and pRCA per Dr. Marroquin. They are agreeable to transfer to [facility] for evaluation and CTS will make appropriate arrangements for this. Tele ACP informed of plan.   Will continue all current therapies/ meds at this time, including Heparin drip for LV thrombus.

## 2021-03-29 NOTE — CHART NOTE - NSCHARTNOTESELECT_GEN_ALL_CORE
Endocrinology/Event Note
CCU Transfer/Transfer Note
Event Note
Event Note
Insurance Coverage for Insulin/Event Note
Sheath removal/Event Note

## 2021-03-29 NOTE — H&P ADULT - ASSESSMENT
51y/o Smooth Female with pmhx of T2DM (diagnosed 2005) and Hypertension (diagnosed 2005) p/w worsening shortness of breath and chest pain for 1 wk admitted for mild DKA (fs 600's) requiring insulin gtt, fluid overload 2/2 to acute heart failure requiring diuresis, and workup for suspected ischemic CAD. TTE done on 3/24 showed EF 35-40%, moderate to severe LV systolic dysfunction, apical thrombus seen, and trace pericardial effusion, and b/l pleural effusions. Cath done on 3/25 showed PCW 30, CO 3.94, CI 2.42, pLAD 80% stenosis, Mid LAD 60% stenosis, Mid circumflex 70%, RCA 75% stenosis, RPDA 70% stenosis, RPLS 60% stenosis at ostium. Patient underwent cardiac viability study today 3/29/21 with results shown above. Patient was subsequently transferred to [facility] for Preop CABG evaluation with Dr. Baig.     3/29 VSS; SR 75, /76, O2 98% RA, on Hep gtt. Currently in NAD, no SOB, no Chest Pain.

## 2021-03-30 DIAGNOSIS — I25.10 ATHEROSCLEROTIC HEART DISEASE OF NATIVE CORONARY ARTERY WITHOUT ANGINA PECTORIS: ICD-10-CM

## 2021-03-30 PROBLEM — I10 ESSENTIAL (PRIMARY) HYPERTENSION: Chronic | Status: ACTIVE | Noted: 2021-03-23

## 2021-03-30 PROBLEM — E11.9 TYPE 2 DIABETES MELLITUS WITHOUT COMPLICATIONS: Chronic | Status: ACTIVE | Noted: 2021-03-23

## 2021-03-30 LAB
ALBUMIN SERPL ELPH-MCNC: 3.2 G/DL — LOW (ref 3.3–5)
ALP SERPL-CCNC: 74 U/L — SIGNIFICANT CHANGE UP (ref 40–120)
ALT FLD-CCNC: 12 U/L — SIGNIFICANT CHANGE UP (ref 10–45)
ANION GAP SERPL CALC-SCNC: 11 MMOL/L — SIGNIFICANT CHANGE UP (ref 5–17)
APPEARANCE UR: ABNORMAL
APTT BLD: 55.3 SEC — HIGH (ref 27.5–35.5)
APTT BLD: 93.7 SEC — HIGH (ref 27.5–35.5)
APTT BLD: 99.7 SEC — HIGH (ref 27.5–35.5)
AST SERPL-CCNC: 14 U/L — SIGNIFICANT CHANGE UP (ref 10–40)
BACTERIA # UR AUTO: NEGATIVE — SIGNIFICANT CHANGE UP
BILIRUB SERPL-MCNC: 0.3 MG/DL — SIGNIFICANT CHANGE UP (ref 0.2–1.2)
BILIRUB UR-MCNC: NEGATIVE — SIGNIFICANT CHANGE UP
BUN SERPL-MCNC: 16 MG/DL — SIGNIFICANT CHANGE UP (ref 7–23)
CALCIUM SERPL-MCNC: 9.6 MG/DL — SIGNIFICANT CHANGE UP (ref 8.4–10.5)
CHLORIDE SERPL-SCNC: 99 MMOL/L — SIGNIFICANT CHANGE UP (ref 96–108)
CO2 SERPL-SCNC: 23 MMOL/L — SIGNIFICANT CHANGE UP (ref 22–31)
COLOR SPEC: SIGNIFICANT CHANGE UP
CREAT SERPL-MCNC: 0.65 MG/DL — SIGNIFICANT CHANGE UP (ref 0.5–1.3)
DIFF PNL FLD: NEGATIVE — SIGNIFICANT CHANGE UP
EPI CELLS # UR: 2 /HPF — SIGNIFICANT CHANGE UP
FIBRINOGEN PPP-MCNC: 629 MG/DL — HIGH (ref 290–520)
GLUCOSE BLDC GLUCOMTR-MCNC: 157 MG/DL — HIGH (ref 70–99)
GLUCOSE BLDC GLUCOMTR-MCNC: 182 MG/DL — HIGH (ref 70–99)
GLUCOSE BLDC GLUCOMTR-MCNC: 184 MG/DL — HIGH (ref 70–99)
GLUCOSE BLDC GLUCOMTR-MCNC: 205 MG/DL — HIGH (ref 70–99)
GLUCOSE BLDC GLUCOMTR-MCNC: 274 MG/DL — HIGH (ref 70–99)
GLUCOSE SERPL-MCNC: 197 MG/DL — HIGH (ref 70–99)
GLUCOSE UR QL: NEGATIVE — SIGNIFICANT CHANGE UP
HCT VFR BLD CALC: 43.2 % — SIGNIFICANT CHANGE UP (ref 34.5–45)
HGB BLD-MCNC: 13.4 G/DL — SIGNIFICANT CHANGE UP (ref 11.5–15.5)
HYALINE CASTS # UR AUTO: 1 /LPF — SIGNIFICANT CHANGE UP (ref 0–2)
KETONES UR-MCNC: NEGATIVE — SIGNIFICANT CHANGE UP
LEUKOCYTE ESTERASE UR-ACNC: ABNORMAL
MAGNESIUM SERPL-MCNC: 1.8 MG/DL — SIGNIFICANT CHANGE UP (ref 1.6–2.6)
MCHC RBC-ENTMCNC: 24.8 PG — LOW (ref 27–34)
MCHC RBC-ENTMCNC: 31 GM/DL — LOW (ref 32–36)
MCV RBC AUTO: 80 FL — SIGNIFICANT CHANGE UP (ref 80–100)
MRSA PCR RESULT.: SIGNIFICANT CHANGE UP
NITRITE UR-MCNC: NEGATIVE — SIGNIFICANT CHANGE UP
NRBC # BLD: 0 /100 WBCS — SIGNIFICANT CHANGE UP (ref 0–0)
PH UR: 6 — SIGNIFICANT CHANGE UP (ref 5–8)
PLATELET # BLD AUTO: 327 K/UL — SIGNIFICANT CHANGE UP (ref 150–400)
POTASSIUM SERPL-MCNC: 3.9 MMOL/L — SIGNIFICANT CHANGE UP (ref 3.5–5.3)
POTASSIUM SERPL-SCNC: 3.9 MMOL/L — SIGNIFICANT CHANGE UP (ref 3.5–5.3)
PROT SERPL-MCNC: 6.6 G/DL — SIGNIFICANT CHANGE UP (ref 6–8.3)
PROT UR-MCNC: SIGNIFICANT CHANGE UP
RBC # BLD: 5.4 M/UL — HIGH (ref 3.8–5.2)
RBC # FLD: 13.3 % — SIGNIFICANT CHANGE UP (ref 10.3–14.5)
RBC CASTS # UR COMP ASSIST: 3 /HPF — SIGNIFICANT CHANGE UP (ref 0–4)
S AUREUS DNA NOSE QL NAA+PROBE: SIGNIFICANT CHANGE UP
SARS-COV-2 RNA SPEC QL NAA+PROBE: SIGNIFICANT CHANGE UP
SODIUM SERPL-SCNC: 133 MMOL/L — LOW (ref 135–145)
SP GR SPEC: 1.01 — LOW (ref 1.01–1.02)
TSH SERPL-MCNC: 5.37 UIU/ML — HIGH (ref 0.27–4.2)
UROBILINOGEN FLD QL: NEGATIVE — SIGNIFICANT CHANGE UP
WBC # BLD: 9.24 K/UL — SIGNIFICANT CHANGE UP (ref 3.8–10.5)
WBC # FLD AUTO: 9.24 K/UL — SIGNIFICANT CHANGE UP (ref 3.8–10.5)
WBC UR QL: 144 /HPF — HIGH (ref 0–5)

## 2021-03-30 PROCEDURE — 93306 TTE W/DOPPLER COMPLETE: CPT | Mod: 26

## 2021-03-30 PROCEDURE — 71045 X-RAY EXAM CHEST 1 VIEW: CPT | Mod: 26

## 2021-03-30 PROCEDURE — 93880 EXTRACRANIAL BILAT STUDY: CPT | Mod: 26

## 2021-03-30 PROCEDURE — 99232 SBSQ HOSP IP/OBS MODERATE 35: CPT

## 2021-03-30 PROCEDURE — 93010 ELECTROCARDIOGRAM REPORT: CPT

## 2021-03-30 RX ORDER — INFLUENZA VIRUS VACCINE 15; 15; 15; 15 UG/.5ML; UG/.5ML; UG/.5ML; UG/.5ML
0.5 SUSPENSION INTRAMUSCULAR ONCE
Refills: 0 | Status: COMPLETED | OUTPATIENT
Start: 2021-03-30 | End: 2021-03-30

## 2021-03-30 RX ORDER — HEPARIN SODIUM 5000 [USP'U]/ML
900 INJECTION INTRAVENOUS; SUBCUTANEOUS
Qty: 25000 | Refills: 0 | Status: DISCONTINUED | OUTPATIENT
Start: 2021-03-30 | End: 2021-03-31

## 2021-03-30 RX ORDER — INSULIN LISPRO 100/ML
4 VIAL (ML) SUBCUTANEOUS ONCE
Refills: 0 | Status: COMPLETED | OUTPATIENT
Start: 2021-03-30 | End: 2021-03-30

## 2021-03-30 RX ORDER — HEPARIN SODIUM 5000 [USP'U]/ML
950 INJECTION INTRAVENOUS; SUBCUTANEOUS
Qty: 25000 | Refills: 0 | Status: DISCONTINUED | OUTPATIENT
Start: 2021-03-30 | End: 2021-03-30

## 2021-03-30 RX ORDER — INSULIN LISPRO 100/ML
10 VIAL (ML) SUBCUTANEOUS
Refills: 0 | Status: DISCONTINUED | OUTPATIENT
Start: 2021-03-30 | End: 2021-04-02

## 2021-03-30 RX ADMIN — Medication 4 UNIT(S): at 12:06

## 2021-03-30 RX ADMIN — INSULIN GLARGINE 17 UNIT(S): 100 INJECTION, SOLUTION SUBCUTANEOUS at 00:32

## 2021-03-30 RX ADMIN — Medication 8 UNIT(S): at 12:05

## 2021-03-30 RX ADMIN — Medication 8 UNIT(S): at 17:11

## 2021-03-30 RX ADMIN — SODIUM CHLORIDE 3 MILLILITER(S): 9 INJECTION INTRAMUSCULAR; INTRAVENOUS; SUBCUTANEOUS at 21:01

## 2021-03-30 RX ADMIN — Medication 25 MILLIGRAM(S): at 17:11

## 2021-03-30 RX ADMIN — SODIUM CHLORIDE 3 MILLILITER(S): 9 INJECTION INTRAMUSCULAR; INTRAVENOUS; SUBCUTANEOUS at 13:44

## 2021-03-30 RX ADMIN — Medication 25 MILLIGRAM(S): at 05:39

## 2021-03-30 RX ADMIN — ATORVASTATIN CALCIUM 20 MILLIGRAM(S): 80 TABLET, FILM COATED ORAL at 22:02

## 2021-03-30 RX ADMIN — HEPARIN SODIUM 9.5 UNIT(S)/HR: 5000 INJECTION INTRAVENOUS; SUBCUTANEOUS at 12:05

## 2021-03-30 RX ADMIN — INSULIN GLARGINE 17 UNIT(S): 100 INJECTION, SOLUTION SUBCUTANEOUS at 22:02

## 2021-03-30 RX ADMIN — Medication 1: at 17:10

## 2021-03-30 RX ADMIN — Medication 20 MILLIGRAM(S): at 05:39

## 2021-03-30 RX ADMIN — Medication 2: at 10:11

## 2021-03-30 RX ADMIN — ATORVASTATIN CALCIUM 20 MILLIGRAM(S): 80 TABLET, FILM COATED ORAL at 00:32

## 2021-03-30 RX ADMIN — Medication 81 MILLIGRAM(S): at 12:05

## 2021-03-30 RX ADMIN — Medication 8 UNIT(S): at 10:11

## 2021-03-30 RX ADMIN — HEPARIN SODIUM 8.5 UNIT(S)/HR: 5000 INJECTION INTRAVENOUS; SUBCUTANEOUS at 07:43

## 2021-03-30 RX ADMIN — SODIUM CHLORIDE 3 MILLILITER(S): 9 INJECTION INTRAMUSCULAR; INTRAVENOUS; SUBCUTANEOUS at 05:34

## 2021-03-30 RX ADMIN — Medication 3: at 12:05

## 2021-03-30 NOTE — PROGRESS NOTE ADULT - PROBLEM SELECTOR PLAN 1
Goal glucose 100-180 mg/dl  Continue with Lantus 17 units SQ qHS   Increase Admelog to 10 units SQ TID before meals (Hold if NPO/not eating meal)  Continue Admelog LOW dose correctional scale before meals and low scale at bedtime  Consistent carbohydrate diet  Check BG before meals and bedtime     Discharge Plan:  Start Semglee PEN is covered, basal/bolus vs basal/orals. Doses TBD  Ensure she has supplies, including glucometer, glucose test strips, lancets, alcohol swabs and insulin PEN needles   F/u with outpatient endocrine Dr. Mathews

## 2021-03-30 NOTE — PATIENT PROFILE ADULT - NSPROPTRIGHTNOTIFY_GEN_A_NUR
pt hypotensive, MD Louis aware. bolus given as ordered, daughter at bedside. charge aware there are no palliative beds available. pt admitted to medicine. yes

## 2021-03-30 NOTE — PROGRESS NOTE ADULT - SUBJECTIVE AND OBJECTIVE BOX
Subjective " hello"    VITAL SIGNS    Telemetry: NSR 74     Vital Signs Last 24 Hrs  T(C): 36.7 (03-30-21 @ 05:17), Max: 37 (03-29-21 @ 20:55)  T(F): 98.1 (03-30-21 @ 05:17), Max: 98.6 (03-29-21 @ 20:55)  HR: 86 (03-30-21 @ 05:17) (83 - 94)  BP: 118/75 (03-30-21 @ 05:17) (99/68 - 118/75)  RR: 18 (03-30-21 @ 05:17) (18 - 18)  SpO2: 97% (03-30-21 @ 05:17) (97% - 100%)           03-29 @ 07:01  -  03-30 @ 07:00  --------------------------------------------------------  IN: 457 mL / OUT: 600 mL / NET: -143 mL    03-30 @ 07:01  -  03-30 @ 09:35  --------------------------------------------------------  IN: 8.5 mL / OUT: 0 mL / NET: 8.5 mL    MEDICATIONS  (STANDING):  aspirin enteric coated 81 milliGRAM(s) Oral daily  atorvastatin 20 milliGRAM(s) Oral at bedtime  heparin  Infusion 900 Unit(s)/Hr (8.5 mL/Hr) IV Continuous <Continuous>  influenza   Vaccine 0.5 milliLiter(s) IntraMuscular once  insulin glargine Injectable (LANTUS) 17 Unit(s) SubCutaneous at bedtime  insulin lispro (ADMELOG) corrective regimen sliding scale   SubCutaneous three times a day before meals  insulin lispro (ADMELOG) corrective regimen sliding scale   SubCutaneous at bedtime  insulin lispro Injectable (ADMELOG) 8 Unit(s) SubCutaneous three times a day before meals  metoprolol tartrate 25 milliGRAM(s) Oral two times a day  sodium chloride 0.9% lock flush 3 milliLiter(s) IV Push every 8 hours  torsemide 20 milliGRAM(s) Oral daily    MEDICATIONS  (PRN):      CAPILLARY BLOOD GLUCOSE      POCT Blood Glucose.: 157 mg/dL (30 Mar 2021 07:16)  POCT Blood Glucose.: 185 mg/dL (29 Mar 2021 23:41)  POCT Blood Glucose.: 213 mg/dL (29 Mar 2021 17:04)  POCT Blood Glucose.: 295 mg/dL (29 Mar 2021 12:03)        PHYSICAL EXAM  Neurology: alert and oriented x 3, nonfocal, no gross deficits    CV :S1 S2 RRR  Lungs:  Abdomen: soft, nontender, nondistended, positive bowel sounds,  :   Voids  Extremities: Equal stength throughout                                          Physical Therapy Rec:   Home  [  ]   Home w/ PT  [  ]  Rehab  [  ]    Discussed with Cardiothoracic Team at AM rounds. Subjective " hello"    VITAL SIGNS    Telemetry: NSR 74     Vital Signs Last 24 Hrs  T(C): 36.7 (03-30-21 @ 05:17), Max: 37 (03-29-21 @ 20:55)  T(F): 98.1 (03-30-21 @ 05:17), Max: 98.6 (03-29-21 @ 20:55)  HR: 86 (03-30-21 @ 05:17) (83 - 94)  BP: 118/75 (03-30-21 @ 05:17) (99/68 - 118/75)  RR: 18 (03-30-21 @ 05:17) (18 - 18)  SpO2: 97% (03-30-21 @ 05:17) (97% - 100%)           03-29 @ 07:01  -  03-30 @ 07:00  --------------------------------------------------------  IN: 457 mL / OUT: 600 mL / NET: -143 mL    03-30 @ 07:01  -  03-30 @ 09:35  --------------------------------------------------------  IN: 8.5 mL / OUT: 0 mL / NET: 8.5 mL    MEDICATIONS  (STANDING):  aspirin enteric coated 81 milliGRAM(s) Oral daily  atorvastatin 20 milliGRAM(s) Oral at bedtime  heparin  Infusion 900 Unit(s)/Hr (8.5 mL/Hr) IV Continuous <Continuous>  influenza   Vaccine 0.5 milliLiter(s) IntraMuscular once  insulin glargine Injectable (LANTUS) 17 Unit(s) SubCutaneous at bedtime  insulin lispro (ADMELOG) corrective regimen sliding scale   SubCutaneous three times a day before meals  insulin lispro (ADMELOG) corrective regimen sliding scale   SubCutaneous at bedtime  insulin lispro Injectable (ADMELOG) 8 Unit(s) SubCutaneous three times a day before meals  metoprolol tartrate 25 milliGRAM(s) Oral two times a day  sodium chloride 0.9% lock flush 3 milliLiter(s) IV Push every 8 hours  torsemide 20 milliGRAM(s) Oral daily    MEDICATIONS  (PRN):      CAPILLARY BLOOD GLUCOSE      POCT Blood Glucose.: 157 mg/dL (30 Mar 2021 07:16)  POCT Blood Glucose.: 185 mg/dL (29 Mar 2021 23:41)  POCT Blood Glucose.: 213 mg/dL (29 Mar 2021 17:04)  POCT Blood Glucose.: 295 mg/dL (29 Mar 2021 12:03)        PHYSICAL EXAM  Neurology: alert and oriented x 3, nonfocal, no gross deficits    CV :S1 S2 RRR  Lungs:  Abdomen: soft, nontender, nondistended, positive bowel sounds,  :   Voids  Extremities: Equal stength throughout      B/lle warm well perfused + DP  no calf tenderness                                      Physical Therapy Rec:   pending    Discussed with Cardiothoracic Team at AM rounds.

## 2021-03-30 NOTE — PROGRESS NOTE ADULT - ASSESSMENT
51y/o Smooth Female with pmhx of T2DM (diagnosed 2005) and Hypertension (diagnosed 2005) p/w worsening shortness of breath and chest pain for 1 wk admitted for mild DKA (fs 600's) requiring insulin gtt, fluid overload 2/2 to acute heart failure requiring diuresis, and workup for suspected ischemic CAD. TTE done on 3/24 showed EF 35-40%, moderate to severe LV systolic dysfunction, apical thrombus seen, and trace pericardial effusion, and b/l pleural effusions. Cath done on 3/25 showed PCW 30, CO 3.94, CI 2.42, pLAD 80% stenosis, Mid LAD 60% stenosis, Mid circumflex 70%, RCA 75% stenosis, RPDA 70% stenosis, RPLS 60% stenosis at ostium. Patient underwent cardiac viability study today 3/29/21 with results shown above. Patient was subsequently transferred to [facility] for Preop CABG evaluation with Dr. Baig.     3/29 VSS; SR 75, /76, O2 98% RA, on Hep gtt. Currently in NAD, no SOB, no Chest Pain.    3/30 VSS carotids this am denies CP OR later this week cardiac surgery workup underway need ECHO today.

## 2021-03-30 NOTE — PROGRESS NOTE ADULT - SUBJECTIVE AND OBJECTIVE BOX
Chief Complaint/Follow-up on:     Subjective:    MEDICATIONS  (STANDING):  aspirin enteric coated 81 milliGRAM(s) Oral daily  atorvastatin 20 milliGRAM(s) Oral at bedtime  heparin  Infusion 950 Unit(s)/Hr (9.5 mL/Hr) IV Continuous <Continuous>  influenza   Vaccine 0.5 milliLiter(s) IntraMuscular once  insulin glargine Injectable (LANTUS) 17 Unit(s) SubCutaneous at bedtime  insulin lispro (ADMELOG) corrective regimen sliding scale   SubCutaneous three times a day before meals  insulin lispro (ADMELOG) corrective regimen sliding scale   SubCutaneous at bedtime  insulin lispro Injectable (ADMELOG) 8 Unit(s) SubCutaneous three times a day before meals  metoprolol tartrate 25 milliGRAM(s) Oral two times a day  sodium chloride 0.9% lock flush 3 milliLiter(s) IV Push every 8 hours  torsemide 20 milliGRAM(s) Oral daily    MEDICATIONS  (PRN):      PHYSICAL EXAM:  VITALS: T(C): 36.6 (03-30-21 @ 10:58)  T(F): 97.9 (03-30-21 @ 10:58), Max: 98.6 (03-29-21 @ 20:55)  HR: 82 (03-30-21 @ 17:00) (82 - 88)  BP: 118/68 (03-30-21 @ 17:00) (102/61 - 118/75)  RR:  (18 - 18)  SpO2:  (97% - 100%)  Wt(kg): --  GENERAL: NAD, well-groomed, well-developed  EYES: No proptosis, no injection  HEENT:  Atraumatic, Normocephalic, moist mucous membranes  THYROID: Normal size, no palpable nodules  RESPIRATORY: Clear to auscultation bilaterally; No rales, rhonchi, wheezing, or rubs  CARDIOVASCULAR: Regular rate and rhythm; No murmurs; no peripheral edema  GI: Soft, nontender, non distended, normal bowel sounds  CUSHING'S SIGNS: no striae    POCT Blood Glucose.: 184 mg/dL (03-30-21 @ 16:34)  POCT Blood Glucose.: 274 mg/dL (03-30-21 @ 12:00)  POCT Blood Glucose.: 205 mg/dL (03-30-21 @ 10:03)  POCT Blood Glucose.: 157 mg/dL (03-30-21 @ 07:16)  POCT Blood Glucose.: 185 mg/dL (03-29-21 @ 23:41)  POCT Blood Glucose.: 213 mg/dL (03-29-21 @ 17:04)  POCT Blood Glucose.: 295 mg/dL (03-29-21 @ 12:03)  POCT Blood Glucose.: 165 mg/dL (03-29-21 @ 07:25)  POCT Blood Glucose.: 174 mg/dL (03-28-21 @ 20:40)  POCT Blood Glucose.: 290 mg/dL (03-28-21 @ 17:04)  POCT Blood Glucose.: 302 mg/dL (03-28-21 @ 17:03)  POCT Blood Glucose.: 187 mg/dL (03-28-21 @ 11:47)  POCT Blood Glucose.: 107 mg/dL (03-28-21 @ 08:01)  POCT Blood Glucose.: 116 mg/dL (03-27-21 @ 21:22)    03-30    133<L>  |  99  |  16  ----------------------------<  197<H>  3.9   |  23  |  0.65    EGFR if : 121  EGFR if non : 104    Ca    9.6      03-30  Mg     1.8     03-30  Phos  4.7     03-29    TPro  6.6  /  Alb  3.2<L>  /  TBili  0.3  /  DBili  x   /  AST  14  /  ALT  12  /  AlkPhos  74  03-30          Thyroid Function Tests:  03-30 @ 03:59 TSH 5.37   03-23 @ 14:38 TSH 1.77

## 2021-03-30 NOTE — PROGRESS NOTE ADULT - ASSESSMENT
50 year old woman with PMH HTN, HLD, uncontrolled DM2 with CAD, p/w worsening shortness of breath and chest pain for 1 week, admitted for management of acute CHF as well as hyperglycemia the 600's with CABG pending for later this week, endocrine consult called for management of uncontrolled DM2. A1c is 13.9%.

## 2021-03-31 LAB
ALBUMIN SERPL ELPH-MCNC: 3.3 G/DL — SIGNIFICANT CHANGE UP (ref 3.3–5)
ALP SERPL-CCNC: 76 U/L — SIGNIFICANT CHANGE UP (ref 40–120)
ALT FLD-CCNC: 12 U/L — SIGNIFICANT CHANGE UP (ref 10–45)
ANION GAP SERPL CALC-SCNC: 11 MMOL/L — SIGNIFICANT CHANGE UP (ref 5–17)
APTT BLD: 61.6 SEC — HIGH (ref 27.5–35.5)
APTT BLD: 94.2 SEC — HIGH (ref 27.5–35.5)
APTT BLD: 94.4 SEC — HIGH (ref 27.5–35.5)
AST SERPL-CCNC: 14 U/L — SIGNIFICANT CHANGE UP (ref 10–40)
BASOPHILS # BLD AUTO: 0.05 K/UL — SIGNIFICANT CHANGE UP (ref 0–0.2)
BASOPHILS NFR BLD AUTO: 0.5 % — SIGNIFICANT CHANGE UP (ref 0–2)
BILIRUB SERPL-MCNC: 0.2 MG/DL — SIGNIFICANT CHANGE UP (ref 0.2–1.2)
BUN SERPL-MCNC: 16 MG/DL — SIGNIFICANT CHANGE UP (ref 7–23)
CALCIUM SERPL-MCNC: 9.5 MG/DL — SIGNIFICANT CHANGE UP (ref 8.4–10.5)
CHLORIDE SERPL-SCNC: 99 MMOL/L — SIGNIFICANT CHANGE UP (ref 96–108)
CO2 SERPL-SCNC: 24 MMOL/L — SIGNIFICANT CHANGE UP (ref 22–31)
CREAT SERPL-MCNC: 0.68 MG/DL — SIGNIFICANT CHANGE UP (ref 0.5–1.3)
EOSINOPHIL # BLD AUTO: 0.18 K/UL — SIGNIFICANT CHANGE UP (ref 0–0.5)
EOSINOPHIL NFR BLD AUTO: 1.9 % — SIGNIFICANT CHANGE UP (ref 0–6)
GLUCOSE BLDC GLUCOMTR-MCNC: 124 MG/DL — HIGH (ref 70–99)
GLUCOSE BLDC GLUCOMTR-MCNC: 175 MG/DL — HIGH (ref 70–99)
GLUCOSE BLDC GLUCOMTR-MCNC: 227 MG/DL — HIGH (ref 70–99)
GLUCOSE BLDC GLUCOMTR-MCNC: 281 MG/DL — HIGH (ref 70–99)
GLUCOSE SERPL-MCNC: 212 MG/DL — HIGH (ref 70–99)
HCT VFR BLD CALC: 42.5 % — SIGNIFICANT CHANGE UP (ref 34.5–45)
HGB BLD-MCNC: 13.5 G/DL — SIGNIFICANT CHANGE UP (ref 11.5–15.5)
IMM GRANULOCYTES NFR BLD AUTO: 0.3 % — SIGNIFICANT CHANGE UP (ref 0–1.5)
LYMPHOCYTES # BLD AUTO: 2.53 K/UL — SIGNIFICANT CHANGE UP (ref 1–3.3)
LYMPHOCYTES # BLD AUTO: 26.6 % — SIGNIFICANT CHANGE UP (ref 13–44)
MAGNESIUM SERPL-MCNC: 1.8 MG/DL — SIGNIFICANT CHANGE UP (ref 1.6–2.6)
MCHC RBC-ENTMCNC: 25.1 PG — LOW (ref 27–34)
MCHC RBC-ENTMCNC: 31.8 GM/DL — LOW (ref 32–36)
MCV RBC AUTO: 79 FL — LOW (ref 80–100)
MONOCYTES # BLD AUTO: 0.95 K/UL — HIGH (ref 0–0.9)
MONOCYTES NFR BLD AUTO: 10 % — SIGNIFICANT CHANGE UP (ref 2–14)
NEUTROPHILS # BLD AUTO: 5.78 K/UL — SIGNIFICANT CHANGE UP (ref 1.8–7.4)
NEUTROPHILS NFR BLD AUTO: 60.7 % — SIGNIFICANT CHANGE UP (ref 43–77)
NRBC # BLD: 0 /100 WBCS — SIGNIFICANT CHANGE UP (ref 0–0)
PLATELET # BLD AUTO: 311 K/UL — SIGNIFICANT CHANGE UP (ref 150–400)
POTASSIUM SERPL-MCNC: 3.8 MMOL/L — SIGNIFICANT CHANGE UP (ref 3.5–5.3)
POTASSIUM SERPL-SCNC: 3.8 MMOL/L — SIGNIFICANT CHANGE UP (ref 3.5–5.3)
PROT SERPL-MCNC: 6.9 G/DL — SIGNIFICANT CHANGE UP (ref 6–8.3)
RBC # BLD: 5.38 M/UL — HIGH (ref 3.8–5.2)
RBC # FLD: 13.2 % — SIGNIFICANT CHANGE UP (ref 10.3–14.5)
SODIUM SERPL-SCNC: 134 MMOL/L — LOW (ref 135–145)
WBC # BLD: 9.52 K/UL — SIGNIFICANT CHANGE UP (ref 3.8–10.5)
WBC # FLD AUTO: 9.52 K/UL — SIGNIFICANT CHANGE UP (ref 3.8–10.5)

## 2021-03-31 RX ORDER — CIPROFLOXACIN LACTATE 400MG/40ML
500 VIAL (ML) INTRAVENOUS EVERY 12 HOURS
Refills: 0 | Status: DISCONTINUED | OUTPATIENT
Start: 2021-03-31 | End: 2021-04-01

## 2021-03-31 RX ORDER — MAGNESIUM OXIDE 400 MG ORAL TABLET 241.3 MG
800 TABLET ORAL ONCE
Refills: 0 | Status: COMPLETED | OUTPATIENT
Start: 2021-03-31 | End: 2021-03-31

## 2021-03-31 RX ORDER — POTASSIUM CHLORIDE 20 MEQ
40 PACKET (EA) ORAL ONCE
Refills: 0 | Status: COMPLETED | OUTPATIENT
Start: 2021-03-31 | End: 2021-03-31

## 2021-03-31 RX ORDER — HEPARIN SODIUM 5000 [USP'U]/ML
700 INJECTION INTRAVENOUS; SUBCUTANEOUS
Qty: 25000 | Refills: 0 | Status: DISCONTINUED | OUTPATIENT
Start: 2021-03-31 | End: 2021-04-01

## 2021-03-31 RX ADMIN — SODIUM CHLORIDE 3 MILLILITER(S): 9 INJECTION INTRAMUSCULAR; INTRAVENOUS; SUBCUTANEOUS at 22:07

## 2021-03-31 RX ADMIN — HEPARIN SODIUM 8 UNIT(S)/HR: 5000 INJECTION INTRAVENOUS; SUBCUTANEOUS at 03:12

## 2021-03-31 RX ADMIN — INSULIN GLARGINE 17 UNIT(S): 100 INJECTION, SOLUTION SUBCUTANEOUS at 22:27

## 2021-03-31 RX ADMIN — Medication 25 MILLIGRAM(S): at 05:25

## 2021-03-31 RX ADMIN — Medication 10 UNIT(S): at 17:07

## 2021-03-31 RX ADMIN — Medication 2: at 11:53

## 2021-03-31 RX ADMIN — Medication 10 UNIT(S): at 11:53

## 2021-03-31 RX ADMIN — SODIUM CHLORIDE 3 MILLILITER(S): 9 INJECTION INTRAMUSCULAR; INTRAVENOUS; SUBCUTANEOUS at 05:38

## 2021-03-31 RX ADMIN — MAGNESIUM OXIDE 400 MG ORAL TABLET 800 MILLIGRAM(S): 241.3 TABLET ORAL at 15:05

## 2021-03-31 RX ADMIN — Medication 10 UNIT(S): at 07:51

## 2021-03-31 RX ADMIN — Medication 81 MILLIGRAM(S): at 12:56

## 2021-03-31 RX ADMIN — HEPARIN SODIUM 7 UNIT(S)/HR: 5000 INJECTION INTRAVENOUS; SUBCUTANEOUS at 18:57

## 2021-03-31 RX ADMIN — Medication 25 MILLIGRAM(S): at 17:07

## 2021-03-31 RX ADMIN — Medication 1: at 07:50

## 2021-03-31 RX ADMIN — Medication 40 MILLIEQUIVALENT(S): at 15:05

## 2021-03-31 RX ADMIN — Medication 10 MILLIGRAM(S): at 05:25

## 2021-03-31 RX ADMIN — HEPARIN SODIUM 7 UNIT(S)/HR: 5000 INJECTION INTRAVENOUS; SUBCUTANEOUS at 13:00

## 2021-03-31 RX ADMIN — Medication 3: at 17:06

## 2021-03-31 RX ADMIN — SODIUM CHLORIDE 3 MILLILITER(S): 9 INJECTION INTRAMUSCULAR; INTRAVENOUS; SUBCUTANEOUS at 13:34

## 2021-03-31 RX ADMIN — ATORVASTATIN CALCIUM 20 MILLIGRAM(S): 80 TABLET, FILM COATED ORAL at 22:26

## 2021-03-31 RX ADMIN — Medication 500 MILLIGRAM(S): at 18:57

## 2021-03-31 NOTE — PROGRESS NOTE ADULT - ASSESSMENT
51 y/o Smooth Female (speaks English) with PMHx of T2DM (diagnosed 2005) and Hypertension (diagnosed 2005) p/w worsening shortness of breath and chest pain for 1 wk admitted for mild DKA (fs 600's) requiring insulin gtt, fluid overload 2/2 to acute heart failure requiring diuresis, and workup for suspected ischemic CAD. TTE done on 3/24/21 showed EF 35-40%, moderate to severe LV systolic dysfunction, apical thrombus seen, and trace pericardial effusion, and b/l pleural effusions. Cath done on 3/25/21 showed PCW 30, CO 3.94, CI 2.42, pLAD 80% stenosis, Mid LAD 60% stenosis, Mid circumflex 70%, RCA 75% stenosis, RPDA 70% stenosis, RPLS 60% stenosis at ostium. Patient underwent cardiac viability study 3/29/21, results reviewed.  Patient was subsequently transferred to [facility] for Preop CABG evaluation with Dr. Baig.     3/29 VSS; SR 75, /76, O2 98% RA, on Hep gtt. Currently in NAD, no SOB, no Chest Pain.    3/30 VSS carotids this am denies CP OR later this week cardiac surgery workup underway need ECHO today.  3/31 VSS OR Friday. Echo and carotids done.       Problem/Plan - 1:  Problem: Coronary artery disease involving native coronary artery of native heart, angina presence unspecified.  Plan: Tele   ECHO results above  CAROTIDS results above  ENDO consulted A1C 13.9- Basal/bolus insulin.   OR this Friday  T&S for tomorrow.   COVID swab tomorrow    Ptt 94 this am.  Heparin gtt lowered from 8 units/hr to 7 units/hr.

## 2021-03-31 NOTE — PROGRESS NOTE ADULT - SUBJECTIVE AND OBJECTIVE BOX
Patient discussed on morning rounds with Dr. Baig    SUBJECTIVE ASSESSMENT:  Patient feels "good" today.  She walks and eats w/o difficulty.  Her presenting symptom was SOB, but currently denies any CP, SOB, dizziness, cough, fever, chills, N/V/D.         Vital Signs Last 24 Hrs  T(C): 36.6 (31 Mar 2021 11:04), Max: 37.1 (30 Mar 2021 20:06)  T(F): 97.9 (31 Mar 2021 11:04), Max: 98.8 (30 Mar 2021 20:06)  HR: 88 (31 Mar 2021 11:04) (81 - 88)  BP: 117/77 (31 Mar 2021 11:04) (105/69 - 126/81)  BP(mean): 96 (31 Mar 2021 05:09) (96 - 96)  RR: 18 (31 Mar 2021 11:04) (18 - 18)  SpO2: 100% (31 Mar 2021 11:04) (96% - 100%)  I&O's Detail    30 Mar 2021 07:01  -  31 Mar 2021 07:00  --------------------------------------------------------  IN:    Heparin: 34 mL    Heparin: 32 mL    Heparin: 148 mL    Oral Fluid: 1100 mL  Total IN: 1314 mL    OUT:    Voided (mL): 1200 mL  Total OUT: 1200 mL    Total NET: 114 mL      31 Mar 2021 07:01  -  31 Mar 2021 14:05  --------------------------------------------------------  IN:    Heparin: 24 mL    Heparin: 22 mL    Oral Fluid: 440 mL  Total IN: 486 mL    OUT:    Voided (mL): 600 mL  Total OUT: 600 mL    Total NET: -114 mL        PHYSICAL EXAM:    GEN: NAD, looks comfortable  Psych: Mood appropriate  Neuro: A&Ox3.  No focal deficits.  Moving all extremities.   HEENT: No obvious abnormalities  CV: S1S2, regular, no murmurs appreciated.  No carotid bruits.  No JVD  Lungs: Clear B/L.  No wheezing, rales or rhonchi  ABD: Soft, non-tender, non-distended.  +Bowel sounds  EXT: Warm and well perfused.  No peripheral edema noted  Musculoskeletal: Moving all extremities with normal ROM, no joint swelling  PV: Pedal pulses palpable      LABS:                        13.5   9.52  )-----------( 311      ( 31 Mar 2021 02:36 )             42.5     PTT - ( 31 Mar 2021 09:39 )  PTT:94.2 sec        134<L>  |  99  |  16  ----------------------------<  212<H>  3.8   |  24  |  0.68    Ca    9.5      31 Mar 2021 02:36  Mg     1.8         TPro  6.9  /  Alb  3.3  /  TBili  0.2  /  DBili  x   /  AST  14  /  ALT  12  /  AlkPhos  76        Urinalysis Basic - ( 30 Mar 2021 01:43 )    Color: Light Yellow / Appearance: Slightly Turbid / S.009 / pH: x  Gluc: x / Ketone: Negative  / Bili: Negative / Urobili: Negative   Blood: x / Protein: Trace / Nitrite: Negative   Leuk Esterase: Large / RBC: 3 /hpf /  /HPF   Sq Epi: x / Non Sq Epi: 2 /hpf / Bacteria: Negative        MEDICATIONS  (STANDING):  aspirin enteric coated 81 milliGRAM(s) Oral daily  atorvastatin 20 milliGRAM(s) Oral at bedtime  heparin  Infusion 700 Unit(s)/Hr (7 mL/Hr) IV Continuous <Continuous>  influenza   Vaccine 0.5 milliLiter(s) IntraMuscular once  insulin glargine Injectable (LANTUS) 17 Unit(s) SubCutaneous at bedtime  insulin lispro (ADMELOG) corrective regimen sliding scale   SubCutaneous three times a day before meals  insulin lispro (ADMELOG) corrective regimen sliding scale   SubCutaneous at bedtime  insulin lispro Injectable (ADMELOG) 10 Unit(s) SubCutaneous three times a day with meals  metoprolol tartrate 25 milliGRAM(s) Oral two times a day  sodium chloride 0.9% lock flush 3 milliLiter(s) IV Push every 8 hours  torsemide 10 milliGRAM(s) Oral daily    MEDICATIONS  (PRN):        RADIOLOGY & ADDITIONAL TESTS:   < from: Xray Chest 1 View AP/PA (21 @ 06:07) >  The heart is slightly enlarged. Improving left lower lobe pneumonia. Clearing right lower lobe pneumonia and right pleural effusion. No radiographic evidence for congestive heart failure. Degenerative changes of the thoracic spine..    < end of copied text >    < from: TTE with Doppler (w/Cont) (21 @ 06:17) >  Conclusions:  1. Normal mitral valve. Minimal mitral regurgitation.  2. Normal trileaflet aortic valve. Minimal aortic  regurgitation.  3. Mild-moderate segmental left ventricular systolic  dysfunction.  Endocardial visualization enhanced with  intravenous injection of Ultrasonic Enhancing Agent  (Definity). Estimated EF of 40-45%.No left ventricular  thrombus. The mid inferolateral wall, the apical lateral  wall, the basal inferior wall, the mid inferior wall, and  the mid anterolateral wall are hypokinetic.  4. Normal right ventricular size with preserved   right  ventricular systolic function. TAPSE 1.57 cm. TV S'  velocity <10 cm/sec.  5. Left pleural effusion.    < end of copied text >    < from: Cardiac Viability (21 @ 10:37) >  ------------------------------------------------------------------------  IMPRESSIONS:Abnormal Study  * Myocardial Perfusion SPECT results are abnormal.  * Review of raw data shows: The study is of good technical  quality, despite overlying attenuation from the breast.  * The left ventricle was normal in size. There is a small,  mild to moderate defect in apical wall and distal inferior  walls that are fixed on 24 hrs delay images, suggestive of  infarct with partial viability. The remaining LV segments  have normal initial uptake of tracer and are thus viable.  * Rest gated wall motion analysis was performed (LVEF = 28  %;LVEDV = 73 ml.), revealing markedly reduced  LV  function. There was focal apical akinesis, severe mid to  distal anterior hypokinesis and moderate to severe diffuse  hypokinesis in the remaining segments.    < end of copied text >     < from: VA Duplex Carotid, Edgar (21 @ 09:31) >    IMPRESSION: No significant hemodynamic stenosis of either carotid artery.    < end of copied text >

## 2021-04-01 LAB
ANION GAP SERPL CALC-SCNC: 12 MMOL/L — SIGNIFICANT CHANGE UP (ref 5–17)
APTT BLD: 62.2 SEC — HIGH (ref 27.5–35.5)
BLD GP AB SCN SERPL QL: NEGATIVE — SIGNIFICANT CHANGE UP
BUN SERPL-MCNC: 14 MG/DL — SIGNIFICANT CHANGE UP (ref 7–23)
CALCIUM SERPL-MCNC: 9.8 MG/DL — SIGNIFICANT CHANGE UP (ref 8.4–10.5)
CHLORIDE SERPL-SCNC: 101 MMOL/L — SIGNIFICANT CHANGE UP (ref 96–108)
CO2 SERPL-SCNC: 21 MMOL/L — LOW (ref 22–31)
CREAT SERPL-MCNC: 0.64 MG/DL — SIGNIFICANT CHANGE UP (ref 0.5–1.3)
CULTURE RESULTS: SIGNIFICANT CHANGE UP
GLUCOSE BLDC GLUCOMTR-MCNC: 130 MG/DL — HIGH (ref 70–99)
GLUCOSE BLDC GLUCOMTR-MCNC: 202 MG/DL — HIGH (ref 70–99)
GLUCOSE BLDC GLUCOMTR-MCNC: 220 MG/DL — HIGH (ref 70–99)
GLUCOSE SERPL-MCNC: 127 MG/DL — HIGH (ref 70–99)
HCG UR QL: NEGATIVE — SIGNIFICANT CHANGE UP
HCT VFR BLD CALC: 41.3 % — SIGNIFICANT CHANGE UP (ref 34.5–45)
HGB BLD-MCNC: 12.9 G/DL — SIGNIFICANT CHANGE UP (ref 11.5–15.5)
INR BLD: 0.99 RATIO — SIGNIFICANT CHANGE UP (ref 0.88–1.16)
MAGNESIUM SERPL-MCNC: 2.1 MG/DL — SIGNIFICANT CHANGE UP (ref 1.6–2.6)
MCHC RBC-ENTMCNC: 25.1 PG — LOW (ref 27–34)
MCHC RBC-ENTMCNC: 31.2 GM/DL — LOW (ref 32–36)
MCV RBC AUTO: 80.5 FL — SIGNIFICANT CHANGE UP (ref 80–100)
NRBC # BLD: 0 /100 WBCS — SIGNIFICANT CHANGE UP (ref 0–0)
PLATELET # BLD AUTO: 333 K/UL — SIGNIFICANT CHANGE UP (ref 150–400)
POTASSIUM SERPL-MCNC: 4.4 MMOL/L — SIGNIFICANT CHANGE UP (ref 3.5–5.3)
POTASSIUM SERPL-SCNC: 4.4 MMOL/L — SIGNIFICANT CHANGE UP (ref 3.5–5.3)
PROTHROM AB SERPL-ACNC: 11.9 SEC — SIGNIFICANT CHANGE UP (ref 10.6–13.6)
RBC # BLD: 5.13 M/UL — SIGNIFICANT CHANGE UP (ref 3.8–5.2)
RBC # FLD: 13.2 % — SIGNIFICANT CHANGE UP (ref 10.3–14.5)
RH IG SCN BLD-IMP: POSITIVE — SIGNIFICANT CHANGE UP
SARS-COV-2 RNA SPEC QL NAA+PROBE: SIGNIFICANT CHANGE UP
SODIUM SERPL-SCNC: 134 MMOL/L — LOW (ref 135–145)
SPECIMEN SOURCE: SIGNIFICANT CHANGE UP
WBC # BLD: 8.73 K/UL — SIGNIFICANT CHANGE UP (ref 3.8–10.5)
WBC # FLD AUTO: 8.73 K/UL — SIGNIFICANT CHANGE UP (ref 3.8–10.5)

## 2021-04-01 PROCEDURE — 94010 BREATHING CAPACITY TEST: CPT | Mod: 26

## 2021-04-01 RX ORDER — CEFUROXIME AXETIL 250 MG
1500 TABLET ORAL ONCE
Refills: 0 | Status: DISCONTINUED | OUTPATIENT
Start: 2021-04-01 | End: 2021-04-02

## 2021-04-01 RX ORDER — INSULIN LISPRO 100/ML
2 VIAL (ML) SUBCUTANEOUS ONCE
Refills: 0 | Status: COMPLETED | OUTPATIENT
Start: 2021-04-01 | End: 2021-04-01

## 2021-04-01 RX ORDER — CHLORHEXIDINE GLUCONATE 213 G/1000ML
1 SOLUTION TOPICAL ONCE
Refills: 0 | Status: COMPLETED | OUTPATIENT
Start: 2021-04-01 | End: 2021-04-01

## 2021-04-01 RX ORDER — CHLORHEXIDINE GLUCONATE 213 G/1000ML
30 SOLUTION TOPICAL ONCE
Refills: 0 | Status: COMPLETED | OUTPATIENT
Start: 2021-04-01 | End: 2021-04-02

## 2021-04-01 RX ORDER — HEPARIN SODIUM 5000 [USP'U]/ML
700 INJECTION INTRAVENOUS; SUBCUTANEOUS
Qty: 25000 | Refills: 0 | Status: DISCONTINUED | OUTPATIENT
Start: 2021-04-01 | End: 2021-04-02

## 2021-04-01 RX ADMIN — SODIUM CHLORIDE 3 MILLILITER(S): 9 INJECTION INTRAMUSCULAR; INTRAVENOUS; SUBCUTANEOUS at 21:54

## 2021-04-01 RX ADMIN — Medication 2 UNIT(S): at 22:29

## 2021-04-01 RX ADMIN — ATORVASTATIN CALCIUM 20 MILLIGRAM(S): 80 TABLET, FILM COATED ORAL at 22:30

## 2021-04-01 RX ADMIN — CHLORHEXIDINE GLUCONATE 1 APPLICATION(S): 213 SOLUTION TOPICAL at 21:53

## 2021-04-01 RX ADMIN — HEPARIN SODIUM 7 UNIT(S)/HR: 5000 INJECTION INTRAVENOUS; SUBCUTANEOUS at 23:22

## 2021-04-01 RX ADMIN — Medication 81 MILLIGRAM(S): at 11:46

## 2021-04-01 RX ADMIN — Medication 10 UNIT(S): at 16:49

## 2021-04-01 RX ADMIN — SODIUM CHLORIDE 3 MILLILITER(S): 9 INJECTION INTRAMUSCULAR; INTRAVENOUS; SUBCUTANEOUS at 12:28

## 2021-04-01 RX ADMIN — Medication 10 MILLIGRAM(S): at 06:33

## 2021-04-01 RX ADMIN — HEPARIN SODIUM 7 UNIT(S)/HR: 5000 INJECTION INTRAVENOUS; SUBCUTANEOUS at 07:58

## 2021-04-01 RX ADMIN — Medication 25 MILLIGRAM(S): at 06:33

## 2021-04-01 RX ADMIN — Medication 10 UNIT(S): at 11:45

## 2021-04-01 RX ADMIN — SODIUM CHLORIDE 3 MILLILITER(S): 9 INJECTION INTRAMUSCULAR; INTRAVENOUS; SUBCUTANEOUS at 06:31

## 2021-04-01 RX ADMIN — Medication 25 MILLIGRAM(S): at 16:49

## 2021-04-01 RX ADMIN — INSULIN GLARGINE 17 UNIT(S): 100 INJECTION, SOLUTION SUBCUTANEOUS at 22:30

## 2021-04-01 RX ADMIN — Medication 500 MILLIGRAM(S): at 06:33

## 2021-04-01 RX ADMIN — Medication 1: at 16:48

## 2021-04-01 NOTE — PROGRESS NOTE ADULT - ASSESSMENT
49 y/o Smooth Female (speaks English) with PMHx of T2DM (diagnosed 2005) and Hypertension (diagnosed 2005) p/w worsening shortness of breath and chest pain for 1 wk admitted for mild DKA (fs 600's) requiring insulin gtt, fluid overload 2/2 to acute heart failure requiring diuresis, and workup for suspected ischemic CAD. TTE done on 3/24/21 showed EF 35-40%, moderate to severe LV systolic dysfunction, apical thrombus seen, and trace pericardial effusion, and b/l pleural effusions. Cath done on 3/25/21 showed PCW 30, CO 3.94, CI 2.42, pLAD 80% stenosis, Mid LAD 60% stenosis, Mid circumflex 70%, RCA 75% stenosis, RPDA 70% stenosis, RPLS 60% stenosis at ostium. Patient underwent cardiac viability study 3/29/21, results reviewed.  Patient was subsequently transferred to [facility] for Preop CABG evaluation with Dr. Baig.     3/29 VSS; SR 75, /76, O2 98% RA, on Hep gtt. Currently in NAD, no SOB, no Chest Pain.    3/30 VSS carotids this am denies CP OR later this week cardiac surgery workup underway need ECHO today.  3/31 VSS OR Friday. Echo and carotids done.   4/1 VSS on heparin gtt for CABG in am workup completed

## 2021-04-01 NOTE — PRE-ANESTHESIA EVALUATION ADULT - NSPREOPDXFT_GEN_ALL_CORE
SUBJECTIVE:  The patient is a 48-year-old white male comes in with a couple issues.  He has degenerative disc disease and chronic low back pain.  He sees pain management.  He's been having trouble with EGD symptoms lately.  He is not a known diabetic.     PAST MEDICAL HISTORY:  Reviewed.    REVIEW OF SYSTEMS:  Please see above; 14 point ROS otherwise negative.      OBJECTIVE: Vitals signs are reviewed and are stable.    HEENT: PERRLA.    Neck:  Supple.    Lungs:  Clear.    Heart:  Regular rate and rhythm.    Abdomen:   Soft, nontender.    Extremities:  No cyanosis, clubbing or edema.      ASSESSMENT:     Chronic low back pain  ED  Tobacco Abuse    PLAN:   CMP fasting lipid testosterone level CBC are ordered.  Patient will call his labs.  His FMLA forms are completed.  He is specialist for his back as scheduled.  Patient is advised to quit smoking.    Much of this encounter note is an electronic transcription/translation of spoken language to printed text.  The electronic translation of spoken language may permit erroneous, or at times, nonsensical words or phrases to be inadvertently transcribed.  Although I have reviewed the note for such errors, some may still exist.   CAD

## 2021-04-01 NOTE — PROGRESS NOTE ADULT - PROBLEM SELECTOR PLAN 1
NPo at midnight NPO at midnight  hibiclens shower tonight and am  peridex swish and rinse in am  brynn on call to OR taped to chart   CABG in am with Dr Baig

## 2021-04-01 NOTE — PROGRESS NOTE ADULT - SUBJECTIVE AND OBJECTIVE BOX
Cardiac Surgery Pre-op Note:    CC: Patient is a 50y old  Female who presents with a chief complaint of CAD  found to have multivessel disease p[laced on heparin  gtt   now for CABG in am with Dr Baig      Referring Physician: Dr Marroquin                                                                                                           Surgeon:Dr Baig    Procedure:  CABG    Allergies  No Known Allergies  Intolerances        HPI:  51y/o Smooth Female with pmhx of T2DM (diagnosed ) and Hypertension (diagnosed ) p/w worsening shortness of breath and chest pain for 1 wk on 3/23. Due to insurance issues, was not able to take home metformin. Last 1 week she was unable to perform her daily routines due to sob with minimal exertion with associated cough and orthopnea requiring 2-3 pillow to sleep. At [facility] ED, pt was found to be in DKA with glucose level in 600s, proBNP 2107, trop 111 with ST depression in V5/V6. Endo was consulted and was placed on insulin gtt and gradually transitioned to SQ insulin. Pt was given Lasix IV push and admitted to CCU on bipap and nitro gtt. TTE done on 3/24 showed EF 35-40%, moderate to severe LV systolic dysfunction, apical thrombus seen, and trace pericardial effusion, and b/l pleural effusions. Pt was warranted for ischemic evaluation and underwent cath on 3/25. LHC/RHC 3/25 showed PCW 30, CO 3.94, CI 2.42, pLAD 80% stenosis, Mid LAD 60% stenosis, Mid circumflex 70%, RCA 75% stenosis, RPDA 70% stenosis, RPLS 60% stenosis at ostium.    (29 Mar 2021 23:16)      PAST MEDICAL & SURGICAL HISTORY:  T2DM (type 2 diabetes mellitus)    Benign essential HTN  HTN (hypertension)  T2DM (type 2 diabetes mellitus)  H/O  section        MEDICATIONS  (STANDING):  aspirin enteric coated 81 milliGRAM(s) Oral daily  atorvastatin 20 milliGRAM(s) Oral at bedtime  heparin  Infusion 700 Unit(s)/Hr (7 mL/Hr) IV Continuous <Continuous>  influenza   Vaccine 0.5 milliLiter(s) IntraMuscular once  insulin glargine Injectable (LANTUS) 17 Unit(s) SubCutaneous at bedtime  insulin lispro (ADMELOG) corrective regimen sliding scale   SubCutaneous three times a day before meals  insulin lispro (ADMELOG) corrective regimen sliding scale   SubCutaneous at bedtime  insulin lispro Injectable (ADMELOG) 10 Unit(s) SubCutaneous three times a day with meals  metoprolol tartrate 25 milliGRAM(s) Oral two times a day  sodium chloride 0.9% lock flush 3 milliLiter(s) IV Push every 8 hours  torsemide 10 milliGRAM(s) Oral daily    MEDICATIONS  (PRN):    T(C): 36.8 (21 @ 04:40), Max: 36.8 (21 @ 04:40)  T(F): 98.2 (21 @ 04:40), Max: 98.2 (21 @ 04:40)  HR: 71 (21 @ 04:40) (71 - 84)  BP: 112/69 (21 @ 04:40) (112/69 - 123/75)  RR: 18 (21 @ 04:40) (18 - 18)  SpO2: 96% (21 @ 04:40) (96% - 96%)  height 160 cm  weight 55.7 kg      Labs:                        12.9   8.73  )-----------( 333      ( 2021 01:11 )             41.3     04    134<L>  |  101  |  14  ----------------------------<  127<H>  4.4   |  21<L>  |  0.64    Ca    9.8      2021 01:11  Mg     2.1         TPro  6.9  /  Alb  3.3  /  TBili  0.2  /  DBili  x   /  AST  14  /  ALT  12  /  AlkPhos  76  03-31    PT/INR - ( 2021 01:11 )   PT: 11.9 sec;   INR: 0.99 ratio         PTT - ( 2021 01:11 )  PTT:62.2 sec    Blood Type: ABO Interpretation: B ( @ 01:26)    HGB A1C: 13.9  albumin: 3.3  Pro-BNP: 2847  Thyroid Panel:  @ 03:59/5.37  --/--/--    MRSA: MRSA PCR Result.: NotDetec ( @ 04:07)   / MSSA:       CXR: xra< from: Xray Chest 1 View AP/PA (21 @ 06:07) >  mpression:    The heart is slightly enlarged. Improving left lower lobe pneumonia. Clearing right lower lobe pneumonia and right pleural effusion. No radiographic evidence for congestive heart failure. Degenerative changes of the thoracic spine..        EKG:< from: 12 Lead ECG (21 @ 08:27) >  Diagnosis Line NORMAL SINUS RHYTHM  POSSIBLE ANTERIOR INFARCT , AGE UNDETERMINED  ABNORMAL ECG  NO PREVIOUS ECGS AVAILABLE  Confirmed by DEMOND JACKSON, MANUEL ([id number]) on 2021 9:32:09 AM    < end of copied text >      Carotid Duplex:  no hemodynamically significant stenosis    PFT's:    Echocardiogram:< from: TTE with Doppler (w/Cont) (21 @ 06:17) >  1. Normal mitral valve. Minimal mitral regurgitation.  2. Normal trileaflet aortic valve. Minimal aortic  regurgitation.  3. Mild-moderate  segmental left ventricular systolic  dysfunction.  Endocardial visualization enhanced with  intravenous injection of Ultrasonic Enhancing Agent  (Definity). Estimated EF of 40-45%.No left ventricular  thrombus. The mid inferolateral wall, the apical lateral  wall, the basal inferior wall, the mid inferior wall, and  the mid anterolateral wall are hypokinetic.  4. Normal right ventricular size with preserved   right  ventricular systolic function. TAPSE 1.57 cm. TV S'  velocity <10 cm/sec.  5. Left pleural effusion.  *** No previous Echo exam.    < end of copied text >      Cardiac catheterization:< from: Cardiac Cath Lab - Adult (21 @ 16:17) >  VENTRICLES: No left ventriculogram was performed.  CORONARY VESSELS: The coronary circulation is right dominant.  LM:   --  Mid left main: There was a discrete 20 % stenosis.  LAD:--  Proximal LAD: There was a tubular 80 % stenosis. There was BASIL  grade 3 flow through the vessel (brisk flow).  --  Mid LAD: There was a diffuse 60 % stenosis. There was BASIL grade 3 flow  through the vessel (brisk flow).  CX:   --  Circumflex: The vessel was small sized.  --  Proximal circumflex: There was a tubular 30 % stenosis.  --  Mid circumflex: There was a diffuse 70 % stenosis.  RCA:   --  Proximal RCA: There was a tubular 75 % stenosis in the distal  third of the vessel segment. There was BASIL grade 3 flow through the  vessel (brisk flow).  --  RPDA: The vessel was small sized. There was a diffuse 70 % stenosis in  the proximal third of the vessel segment. There was BASIL grade 3 flow  through the vessel (brisk flow).  --  RPLS: There was a discrete 60 % stenosis at the ostium of the vessel  segment. There was BASIL grade 3 flow through the vessel (brisk flow). In a  second lesion, there was a discrete 80 % stenosis in the distal third of  the vessel segment, just before RPL1.There was BASIL grade 3 flow through  the vessel (brisk flow).  --  RPL1: The vessel was small sized. Angiography showed severe  atherosclerosis.  COMPLICATIONS: There were no complications.  DIAGNOSTIC RECOMMENDATIONS: Medical management is recommended. Recommend  viability testing to guide CAD management. Recommend diuresis for elevated  LV filling pressure.    < end of copied text >    jason< from: Cardiac Viability (21 @ 10:37) >  IMPRESSIONS:Abnormal Study  * Myocardial Perfusion SPECT results are abnormal.  * Review of raw data shows: The study is of good technical  quality, despite overlying attenuation from the breast.  * The left ventricle was normal in size. There is a small,  mild to moderate defect in apical wall and distal inferior  walls that are fixed on 24 hrs delay images, suggestive of  infarct with partial viability. The remaining LV segments  have normal initial uptake of tracer and are thus viable.  * Rest gated wall motion analysis was performed (LVEF = 28  %;LVEDV = 73 ml.), revealing markedly reduced  LV  function. There was focal apical akinesis, severe mid to  distal anterior hypokinesis and moderate to severe diffuse  hypokinesis in the remaining segments.    < end of copied text >    Vein Mapping:    Gen: WN/WD NAD  Neuro: AAOx3, nonfocal  Pulm: CTA B/L  CV: RRR, S1S2  Abd: Soft, NT, ND +BS  Ext: No edema, + peripheral pulses      Pt has AICD/PPM [ ] Yes  [x No             Brand Name:  Pre-op Beta Blocker ordered within 24 hrs of surgery (CABG ONLY)?  [ ] Yes metorpolol 25 BID  Type & Cross  [x] Yes  [ ] No  NPO after Midnight [x ] Yes  [ ] No  Pre-op ABX ordered, to be taped on chart:  x[ ] Yes  [ ] No     Hibiclens/Peridex ordered x[ ] Yes  [ ] No  Intraop on Hold: PRBCs, CXR, JACEY [x ]   Consent obtained  [x] Yes  [ ] No

## 2021-04-01 NOTE — PRE-ANESTHESIA EVALUATION ADULT - NSRADCARDRESULTSFT_GEN_ALL_CORE
TTE 3/30/2021  Conclusions:   1. Normal mitral valve. Minimal mitral regurgitation.  2. Normal trileaflet aortic valve. Minimal aortic regurgitation.   3. Mild-moderate  segmental left ventricular systolic dysfunction.  Endocardial visualization enhanced with intravenous injection of Ultrasonic Enhancing Agent (Definity). Estimated EF of 40-45%.No left ventricular thrombus. The mid inferolateral wall, the apical lateral wall, the basal inferior wall, the mid inferior wall, and the mid anterolateral wall are hypokinetic.   4. Normal right ventricular size with preserved   right ventricular systolic function. TAPSE 1.57 cm. TV S' velocity <10 cm/sec.  5. Left pleural effusion.    Nuclear Viability study 3/29/2021  GATED ANALYSIS:  Rest gated wall motion analysis was performed (LVEF = 28 %;LVEDV = 73 ml.), revealing markedly reduced  LV function. There was focal apical akinesis, severe mid to distal anterior hypokinesis and moderate to severe diffuse hypokinesis in the remaining segments.  ------------------------------------------------------------------------  IMPRESSIONS:Abnormal Study  * Myocardial Perfusion SPECT results are abnormal.  * Review of raw data shows: The study is of good technical quality, despite overlying attenuation from the breast.  * The left ventricle was normal in size. There is a small, mild to moderate defect in apical wall and distal inferior walls that are fixed on 24 hrs delay images, suggestive of infarct with partial viability. The remaining LV segments have normal initial uptake of tracer and are thus viable.  * Rest gated wall motion analysis was performed (LVEF = 28 %;LVEDV = 73 ml.), revealing markedly reduced  LV function. There was focal apical akinesis, severe mid to distal anterior hypokinesis and moderate to severe diffuse hypokinesis in the remaining segments.    Cardiac Cath 3/25/2021  CORONARY VESSELS: The coronary circulation is right dominant.  LM:   --  Mid left main: There was a discrete 20 % stenosis.  LAD:   --  Proximal LAD: There was a tubular 80 % stenosis. There was BASIL grade 3 flow through the vessel (brisk flow).  --  Mid LAD: There was a diffuse 60 % stenosis. There was BASIL grade 3 flow through the vessel (brisk flow).   CX:   --  Circumflex: The vessel was small sized.  --  Proximal circumflex: There was a tubular 30 % stenosis.  --  Mid circumflex: There was a diffuse 70 % stenosis.  RCA:   --  Proximal RCA: There was a tubular 75 % stenosis in the distal third of the vessel segment. There was BASIL grade 3 flow through the vessel (brisk flow).  --  RPDA: The vessel was small sized. There was a diffuse 70 % stenosis in the proximal third of the vessel segment. There was BASIL grade 3 flow through the vessel (brisk flow).  --  RPLS: There was a discrete 60 % stenosis at the ostium of the vessel segment. There was BASIL grade 3 flow through the vessel (brisk flow). In a second lesion, there was a discrete 80 % stenosis in the distal third of the vessel segment, just before RPL1. There was BASIL grade 3 flow through the vessel (brisk flow).  --  RPL1: The vessel was small sized. Angiography showed severe atherosclerosis.  COMPLICATIONS: There were no complications.  DIAGNOSTIC RECOMMENDATIONS: Medical management is recommended. Recommend viability testing to guide CAD management. Recommend diuresis for elevated LV filling pressure.

## 2021-04-01 NOTE — PRE-ANESTHESIA EVALUATION ADULT - NSANTHPMHFT_GEN_ALL_CORE
51 yo F Female w/ PMHx of DM2 (not on insulin), HTN presented w/  worsening shortness of breath and chest pain for 1 wk on 3/23. At [facility] ED, pt was found to be in DKA and acute systolic CHF. Diuresed and TTE done on 3/24 showed EF 35-40%, moderate to severe LV systolic dysfunction, apical thrombus seen, and trace pericardial effusion, and b/l pleural effusions. Underwent cath on 3/25. LHC/RHC 3/25 showed PCW 30, CO 3.94, CI 2.42, pLAD 80% stenosis, Mid LAD 60% stenosis, Mid circumflex 70%, RCA 75% stenosis, RPDA 70% stenosis, RPLS 60% stenosis at ostium. Perfusion study revealed viability. Presents today for CABG 49 yo F Female w/ PMHx of DM2 (not on insulin), HTN presented w/  worsening shortness of breath and chest pain for 1 wk on 3/23. At [facility] ED, pt was found to be in DKA and acute systolic CHF. Diuresed and TTE done on 3/24 showed EF 35-40%, moderate to severe LV systolic dysfunction, apical thrombus seen, and trace pericardial effusion, and b/l pleural effusions. Underwent cath on 3/25. LHC/RHC 3/25 showed PCW 30, CO 3.94, CI 2.42, pLAD 80% stenosis, Mid LAD 60% stenosis, Mid circumflex 70%, RCA 75% stenosis, RPDA 70% stenosis, RPLS 60% stenosis at ostium. Perfusion study revealed viability. Presents today for CABG    Denies active CP/SOB/Orthopnea

## 2021-04-02 ENCOUNTER — APPOINTMENT (OUTPATIENT)
Dept: CARDIOTHORACIC SURGERY | Facility: HOSPITAL | Age: 51
End: 2021-04-02

## 2021-04-02 PROBLEM — I10 ESSENTIAL (PRIMARY) HYPERTENSION: Chronic | Status: ACTIVE | Noted: 2021-03-29

## 2021-04-02 PROBLEM — E11.9 TYPE 2 DIABETES MELLITUS WITHOUT COMPLICATIONS: Chronic | Status: ACTIVE | Noted: 2021-03-29

## 2021-04-02 LAB
ALBUMIN SERPL ELPH-MCNC: 3.1 G/DL — LOW (ref 3.3–5)
ALBUMIN SERPL ELPH-MCNC: 3.4 G/DL — SIGNIFICANT CHANGE UP (ref 3.3–5)
ALP SERPL-CCNC: 36 U/L — LOW (ref 40–120)
ALP SERPL-CCNC: 70 U/L — SIGNIFICANT CHANGE UP (ref 40–120)
ALT FLD-CCNC: 12 U/L — SIGNIFICANT CHANGE UP (ref 10–45)
ALT FLD-CCNC: 7 U/L — LOW (ref 10–45)
ANION GAP SERPL CALC-SCNC: 12 MMOL/L — SIGNIFICANT CHANGE UP (ref 5–17)
ANION GAP SERPL CALC-SCNC: 15 MMOL/L — SIGNIFICANT CHANGE UP (ref 5–17)
APTT BLD: 28.3 SEC — SIGNIFICANT CHANGE UP (ref 27.5–35.5)
AST SERPL-CCNC: 16 U/L — SIGNIFICANT CHANGE UP (ref 10–40)
AST SERPL-CCNC: 26 U/L — SIGNIFICANT CHANGE UP (ref 10–40)
BASE EXCESS BLDV CALC-SCNC: -0.7 MMOL/L — SIGNIFICANT CHANGE UP (ref -2–2)
BASE EXCESS BLDV CALC-SCNC: -2.5 MMOL/L — LOW (ref -2–2)
BASE EXCESS BLDV CALC-SCNC: 0.5 MMOL/L — SIGNIFICANT CHANGE UP (ref -2–2)
BASE EXCESS BLDV CALC-SCNC: 2.2 MMOL/L — HIGH (ref -2–2)
BASE EXCESS BLDV CALC-SCNC: 3.5 MMOL/L — HIGH (ref -2–2)
BASE EXCESS BLDV CALC-SCNC: 3.6 MMOL/L — HIGH (ref -2–2)
BASOPHILS # BLD AUTO: 0.06 K/UL — SIGNIFICANT CHANGE UP (ref 0–0.2)
BASOPHILS # BLD AUTO: 0.11 K/UL — SIGNIFICANT CHANGE UP (ref 0–0.2)
BASOPHILS NFR BLD AUTO: 0.8 % — SIGNIFICANT CHANGE UP (ref 0–2)
BASOPHILS NFR BLD AUTO: 1 % — SIGNIFICANT CHANGE UP (ref 0–2)
BILIRUB SERPL-MCNC: 0.2 MG/DL — SIGNIFICANT CHANGE UP (ref 0.2–1.2)
BILIRUB SERPL-MCNC: 0.8 MG/DL — SIGNIFICANT CHANGE UP (ref 0.2–1.2)
BLOOD GAS VENOUS - CREATININE: SIGNIFICANT CHANGE UP MG/DL (ref 0.5–1.3)
BUN SERPL-MCNC: 10 MG/DL — SIGNIFICANT CHANGE UP (ref 7–23)
BUN SERPL-MCNC: 13 MG/DL — SIGNIFICANT CHANGE UP (ref 7–23)
CA-I SERPL-SCNC: 0.9 MMOL/L — LOW (ref 1.12–1.3)
CA-I SERPL-SCNC: 0.91 MMOL/L — LOW (ref 1.12–1.3)
CA-I SERPL-SCNC: 0.91 MMOL/L — LOW (ref 1.12–1.3)
CA-I SERPL-SCNC: 0.93 MMOL/L — LOW (ref 1.12–1.3)
CA-I SERPL-SCNC: 0.95 MMOL/L — LOW (ref 1.12–1.3)
CA-I SERPL-SCNC: 1.22 MMOL/L — SIGNIFICANT CHANGE UP (ref 1.12–1.3)
CALCIUM SERPL-MCNC: 8.7 MG/DL — SIGNIFICANT CHANGE UP (ref 8.4–10.5)
CALCIUM SERPL-MCNC: 9.6 MG/DL — SIGNIFICANT CHANGE UP (ref 8.4–10.5)
CHLORIDE BLDV-SCNC: 110 MMOL/L — HIGH (ref 96–108)
CHLORIDE BLDV-SCNC: SIGNIFICANT CHANGE UP MMOL/L (ref 96–108)
CHLORIDE SERPL-SCNC: 105 MMOL/L — SIGNIFICANT CHANGE UP (ref 96–108)
CHLORIDE SERPL-SCNC: 110 MMOL/L — HIGH (ref 96–108)
CK MB BLD-MCNC: 9.3 % — HIGH (ref 0–3.5)
CK MB CFR SERPL CALC: 20.7 NG/ML — HIGH (ref 0–3.8)
CK SERPL-CCNC: 223 U/L — HIGH (ref 25–170)
CO2 BLDV-SCNC: 25 MMOL/L — SIGNIFICANT CHANGE UP (ref 22–30)
CO2 SERPL-SCNC: 21 MMOL/L — LOW (ref 22–31)
CO2 SERPL-SCNC: 21 MMOL/L — LOW (ref 22–31)
COVID-19 SPIKE DOMAIN AB INTERP: POSITIVE
COVID-19 SPIKE DOMAIN ANTIBODY RESULT: 188 AU/ML — HIGH
CREAT SERPL-MCNC: 0.6 MG/DL — SIGNIFICANT CHANGE UP (ref 0.5–1.3)
CREAT SERPL-MCNC: 0.62 MG/DL — SIGNIFICANT CHANGE UP (ref 0.5–1.3)
EOSINOPHIL # BLD AUTO: 0.11 K/UL — SIGNIFICANT CHANGE UP (ref 0–0.5)
EOSINOPHIL # BLD AUTO: 0.18 K/UL — SIGNIFICANT CHANGE UP (ref 0–0.5)
EOSINOPHIL NFR BLD AUTO: 1 % — SIGNIFICANT CHANGE UP (ref 0–6)
EOSINOPHIL NFR BLD AUTO: 2.4 % — SIGNIFICANT CHANGE UP (ref 0–6)
FIBRINOGEN PPP-MCNC: 336 MG/DL — SIGNIFICANT CHANGE UP (ref 290–520)
GAS PNL BLDA: SIGNIFICANT CHANGE UP
GAS PNL BLDV: 137 MMOL/L — SIGNIFICANT CHANGE UP (ref 135–145)
GAS PNL BLDV: 141 MMOL/L — SIGNIFICANT CHANGE UP (ref 135–145)
GAS PNL BLDV: 142 MMOL/L — SIGNIFICANT CHANGE UP (ref 135–145)
GAS PNL BLDV: 143 MMOL/L — SIGNIFICANT CHANGE UP (ref 135–145)
GAS PNL BLDV: SIGNIFICANT CHANGE UP
GLUCOSE BLDC GLUCOMTR-MCNC: 113 MG/DL — HIGH (ref 70–99)
GLUCOSE BLDC GLUCOMTR-MCNC: 122 MG/DL — HIGH (ref 70–99)
GLUCOSE BLDC GLUCOMTR-MCNC: 141 MG/DL — HIGH (ref 70–99)
GLUCOSE BLDC GLUCOMTR-MCNC: 148 MG/DL — HIGH (ref 70–99)
GLUCOSE BLDV-MCNC: 104 MG/DL — HIGH (ref 70–99)
GLUCOSE BLDV-MCNC: 117 MG/DL — HIGH (ref 70–99)
GLUCOSE BLDV-MCNC: 121 MG/DL — HIGH (ref 70–99)
GLUCOSE BLDV-MCNC: 132 MG/DL — HIGH (ref 70–99)
GLUCOSE BLDV-MCNC: 136 MG/DL — HIGH (ref 70–99)
GLUCOSE BLDV-MCNC: 143 MG/DL — HIGH (ref 70–99)
GLUCOSE SERPL-MCNC: 101 MG/DL — HIGH (ref 70–99)
GLUCOSE SERPL-MCNC: 145 MG/DL — HIGH (ref 70–99)
HCO3 BLDV-SCNC: 23 MMOL/L — SIGNIFICANT CHANGE UP (ref 21–29)
HCO3 BLDV-SCNC: 24 MMOL/L — SIGNIFICANT CHANGE UP (ref 21–29)
HCO3 BLDV-SCNC: 25 MMOL/L — SIGNIFICANT CHANGE UP (ref 21–29)
HCO3 BLDV-SCNC: 26 MMOL/L — SIGNIFICANT CHANGE UP (ref 21–29)
HCO3 BLDV-SCNC: 28 MMOL/L — SIGNIFICANT CHANGE UP (ref 21–29)
HCO3 BLDV-SCNC: 28 MMOL/L — SIGNIFICANT CHANGE UP (ref 21–29)
HCT VFR BLD CALC: 25.9 % — LOW (ref 34.5–45)
HCT VFR BLD CALC: 40.9 % — SIGNIFICANT CHANGE UP (ref 34.5–45)
HCT VFR BLDA CALC: 19 % — CRITICAL LOW (ref 39–50)
HCT VFR BLDA CALC: 20 % — CRITICAL LOW (ref 39–50)
HCT VFR BLDA CALC: 22 % — CRITICAL LOW (ref 39–50)
HCT VFR BLDA CALC: 22 % — CRITICAL LOW (ref 39–50)
HCT VFR BLDA CALC: 23 % — LOW (ref 39–50)
HCT VFR BLDA CALC: 28 % — LOW (ref 39–50)
HGB BLD CALC-MCNC: 6 G/DL — CRITICAL LOW (ref 11.5–15.5)
HGB BLD CALC-MCNC: 6.3 G/DL — CRITICAL LOW (ref 11.5–15.5)
HGB BLD CALC-MCNC: 6.9 G/DL — CRITICAL LOW (ref 11.5–15.5)
HGB BLD CALC-MCNC: 7 G/DL — CRITICAL LOW (ref 11.5–15.5)
HGB BLD CALC-MCNC: 7.3 G/DL — LOW (ref 11.5–15.5)
HGB BLD CALC-MCNC: 9.1 G/DL — LOW (ref 11.5–15.5)
HGB BLD-MCNC: 12.7 G/DL — SIGNIFICANT CHANGE UP (ref 11.5–15.5)
HGB BLD-MCNC: 8.6 G/DL — LOW (ref 11.5–15.5)
HOROWITZ INDEX BLDV+IHG-RTO: 0 — SIGNIFICANT CHANGE UP
HOROWITZ INDEX BLDV+IHG-RTO: 100 — SIGNIFICANT CHANGE UP
IMM GRANULOCYTES NFR BLD AUTO: 0.1 % — SIGNIFICANT CHANGE UP (ref 0–1.5)
INR BLD: 1 RATIO — SIGNIFICANT CHANGE UP (ref 0.88–1.16)
INR BLD: 1.21 RATIO — HIGH (ref 0.88–1.16)
LACTATE BLDV-MCNC: 0.9 MMOL/L — SIGNIFICANT CHANGE UP (ref 0.7–2)
LACTATE BLDV-MCNC: 0.9 MMOL/L — SIGNIFICANT CHANGE UP (ref 0.7–2)
LACTATE BLDV-MCNC: 1.1 MMOL/L — SIGNIFICANT CHANGE UP (ref 0.7–2)
LACTATE BLDV-MCNC: 1.2 MMOL/L — SIGNIFICANT CHANGE UP (ref 0.7–2)
LACTATE BLDV-MCNC: 1.3 MMOL/L — SIGNIFICANT CHANGE UP (ref 0.7–2)
LACTATE BLDV-MCNC: 1.3 MMOL/L — SIGNIFICANT CHANGE UP (ref 0.7–2)
LYMPHOCYTES # BLD AUTO: 1.71 K/UL — SIGNIFICANT CHANGE UP (ref 1–3.3)
LYMPHOCYTES # BLD AUTO: 15 % — SIGNIFICANT CHANGE UP (ref 13–44)
LYMPHOCYTES # BLD AUTO: 2.63 K/UL — SIGNIFICANT CHANGE UP (ref 1–3.3)
LYMPHOCYTES # BLD AUTO: 34.4 % — SIGNIFICANT CHANGE UP (ref 13–44)
MAGNESIUM SERPL-MCNC: 1.8 MG/DL — SIGNIFICANT CHANGE UP (ref 1.6–2.6)
MAGNESIUM SERPL-MCNC: 4.1 MG/DL — HIGH (ref 1.6–2.6)
MANUAL SMEAR VERIFICATION: SIGNIFICANT CHANGE UP
MCHC RBC-ENTMCNC: 25.5 PG — LOW (ref 27–34)
MCHC RBC-ENTMCNC: 26.5 PG — LOW (ref 27–34)
MCHC RBC-ENTMCNC: 31.1 GM/DL — LOW (ref 32–36)
MCHC RBC-ENTMCNC: 33.2 GM/DL — SIGNIFICANT CHANGE UP (ref 32–36)
MCV RBC AUTO: 79.9 FL — LOW (ref 80–100)
MCV RBC AUTO: 82.1 FL — SIGNIFICANT CHANGE UP (ref 80–100)
MONOCYTES # BLD AUTO: 0.8 K/UL — SIGNIFICANT CHANGE UP (ref 0–0.9)
MONOCYTES # BLD AUTO: 0.8 K/UL — SIGNIFICANT CHANGE UP (ref 0–0.9)
MONOCYTES NFR BLD AUTO: 10.5 % — SIGNIFICANT CHANGE UP (ref 2–14)
MONOCYTES NFR BLD AUTO: 7 % — SIGNIFICANT CHANGE UP (ref 2–14)
NEUTROPHILS # BLD AUTO: 3.96 K/UL — SIGNIFICANT CHANGE UP (ref 1.8–7.4)
NEUTROPHILS # BLD AUTO: 8.68 K/UL — HIGH (ref 1.8–7.4)
NEUTROPHILS NFR BLD AUTO: 51.8 % — SIGNIFICANT CHANGE UP (ref 43–77)
NEUTROPHILS NFR BLD AUTO: 70 % — SIGNIFICANT CHANGE UP (ref 43–77)
NEUTS BAND # BLD: 6 % — SIGNIFICANT CHANGE UP (ref 0–8)
NRBC # BLD: 0 /100 WBCS — SIGNIFICANT CHANGE UP (ref 0–0)
NRBC # BLD: 0 /100 — SIGNIFICANT CHANGE UP (ref 0–0)
OTHER CELLS CSF MANUAL: 10 ML/DL — LOW (ref 18–22)
PCO2 BLDV: 41 MMHG — SIGNIFICANT CHANGE UP (ref 39–42)
PCO2 BLDV: 42 MMHG — SIGNIFICANT CHANGE UP (ref 39–42)
PCO2 BLDV: 43 MMHG — HIGH (ref 39–42)
PCO2 BLDV: 44 MMHG — HIGH (ref 39–42)
PCO2 BLDV: 46 MMHG — HIGH (ref 39–42)
PCO2 BLDV: 46 MMHG — HIGH (ref 39–42)
PH BLDV: 7.32 — LOW (ref 7.35–7.45)
PH BLDV: 7.36 — SIGNIFICANT CHANGE UP (ref 7.35–7.45)
PH BLDV: 7.38 — SIGNIFICANT CHANGE UP (ref 7.35–7.45)
PH BLDV: 7.4 — SIGNIFICANT CHANGE UP (ref 7.35–7.45)
PH BLDV: 7.43 — SIGNIFICANT CHANGE UP (ref 7.35–7.45)
PH BLDV: 7.43 — SIGNIFICANT CHANGE UP (ref 7.35–7.45)
PHOSPHATE SERPL-MCNC: 4.3 MG/DL — SIGNIFICANT CHANGE UP (ref 2.5–4.5)
PLAT MORPH BLD: NORMAL — SIGNIFICANT CHANGE UP
PLATELET # BLD AUTO: 123 K/UL — LOW (ref 150–400)
PLATELET # BLD AUTO: 292 K/UL — SIGNIFICANT CHANGE UP (ref 150–400)
PO2 BLDV: 48 MMHG — HIGH (ref 25–45)
PO2 BLDV: 55 MMHG — HIGH (ref 25–45)
PO2 BLDV: 57 MMHG — HIGH (ref 25–45)
PO2 BLDV: 61 MMHG — HIGH (ref 25–45)
PO2 BLDV: 62 MMHG — HIGH (ref 25–45)
PO2 BLDV: 72 MMHG — HIGH (ref 25–45)
POTASSIUM BLDV-SCNC: 3.7 MMOL/L — SIGNIFICANT CHANGE UP (ref 3.5–5)
POTASSIUM BLDV-SCNC: 4.1 MMOL/L — SIGNIFICANT CHANGE UP (ref 3.5–5.3)
POTASSIUM BLDV-SCNC: 5 MMOL/L — SIGNIFICANT CHANGE UP (ref 3.5–5)
POTASSIUM BLDV-SCNC: 5.1 MMOL/L — HIGH (ref 3.5–5)
POTASSIUM BLDV-SCNC: 5.1 MMOL/L — HIGH (ref 3.5–5)
POTASSIUM BLDV-SCNC: 5.6 MMOL/L — HIGH (ref 3.5–5)
POTASSIUM SERPL-MCNC: 4.1 MMOL/L — SIGNIFICANT CHANGE UP (ref 3.5–5.3)
POTASSIUM SERPL-MCNC: 4.2 MMOL/L — SIGNIFICANT CHANGE UP (ref 3.5–5.3)
POTASSIUM SERPL-SCNC: 4.1 MMOL/L — SIGNIFICANT CHANGE UP (ref 3.5–5.3)
POTASSIUM SERPL-SCNC: 4.2 MMOL/L — SIGNIFICANT CHANGE UP (ref 3.5–5.3)
PROT SERPL-MCNC: 4.8 G/DL — LOW (ref 6–8.3)
PROT SERPL-MCNC: 6.7 G/DL — SIGNIFICANT CHANGE UP (ref 6–8.3)
PROTHROM AB SERPL-ACNC: 12 SEC — SIGNIFICANT CHANGE UP (ref 10.6–13.6)
PROTHROM AB SERPL-ACNC: 14.4 SEC — HIGH (ref 10.6–13.6)
RBC # BLD: 3.24 M/UL — LOW (ref 3.8–5.2)
RBC # BLD: 4.98 M/UL — SIGNIFICANT CHANGE UP (ref 3.8–5.2)
RBC # FLD: 13.2 % — SIGNIFICANT CHANGE UP (ref 10.3–14.5)
RBC # FLD: 13.7 % — SIGNIFICANT CHANGE UP (ref 10.3–14.5)
RBC BLD AUTO: SIGNIFICANT CHANGE UP
SAO2 % BLDV: 80 % — SIGNIFICANT CHANGE UP (ref 67–88)
SAO2 % BLDV: 90 % — HIGH (ref 67–88)
SAO2 % BLDV: 90 % — HIGH (ref 67–88)
SAO2 % BLDV: 92 % — HIGH (ref 67–88)
SAO2 % BLDV: 94 % — HIGH (ref 67–88)
SAO2 % BLDV: 96 % — HIGH (ref 67–88)
SARS-COV-2 IGG+IGM SERPL QL IA: 188 AU/ML — HIGH
SARS-COV-2 IGG+IGM SERPL QL IA: POSITIVE
SODIUM SERPL-SCNC: 138 MMOL/L — SIGNIFICANT CHANGE UP (ref 135–145)
SODIUM SERPL-SCNC: 146 MMOL/L — HIGH (ref 135–145)
TROPONIN T, HIGH SENSITIVITY RESULT: 440 NG/L — HIGH (ref 0–51)
WBC # BLD: 11.42 K/UL — HIGH (ref 3.8–10.5)
WBC # BLD: 7.64 K/UL — SIGNIFICANT CHANGE UP (ref 3.8–10.5)
WBC # FLD AUTO: 11.42 K/UL — HIGH (ref 3.8–10.5)
WBC # FLD AUTO: 7.64 K/UL — SIGNIFICANT CHANGE UP (ref 3.8–10.5)

## 2021-04-02 PROCEDURE — 99291 CRITICAL CARE FIRST HOUR: CPT

## 2021-04-02 PROCEDURE — 33533 CABG ARTERIAL SINGLE: CPT

## 2021-04-02 PROCEDURE — 33508 ENDOSCOPIC VEIN HARVEST: CPT | Mod: 59

## 2021-04-02 PROCEDURE — 33518 CABG ARTERY-VEIN TWO: CPT

## 2021-04-02 PROCEDURE — 71045 X-RAY EXAM CHEST 1 VIEW: CPT | Mod: 26

## 2021-04-02 PROCEDURE — 93010 ELECTROCARDIOGRAM REPORT: CPT

## 2021-04-02 RX ORDER — ASPIRIN/CALCIUM CARB/MAGNESIUM 324 MG
325 TABLET ORAL DAILY
Refills: 0 | Status: DISCONTINUED | OUTPATIENT
Start: 2021-04-02 | End: 2021-04-05

## 2021-04-02 RX ORDER — ALBUMIN HUMAN 25 %
250 VIAL (ML) INTRAVENOUS ONCE
Refills: 0 | Status: COMPLETED | OUTPATIENT
Start: 2021-04-02 | End: 2021-04-02

## 2021-04-02 RX ORDER — OXYCODONE HYDROCHLORIDE 5 MG/1
5 TABLET ORAL EVERY 4 HOURS
Refills: 0 | Status: DISCONTINUED | OUTPATIENT
Start: 2021-04-02 | End: 2021-04-09

## 2021-04-02 RX ORDER — ACETAMINOPHEN 500 MG
650 TABLET ORAL EVERY 6 HOURS
Refills: 0 | Status: DISCONTINUED | OUTPATIENT
Start: 2021-04-02 | End: 2021-04-09

## 2021-04-02 RX ORDER — DEXMEDETOMIDINE HYDROCHLORIDE IN 0.9% SODIUM CHLORIDE 4 UG/ML
0.7 INJECTION INTRAVENOUS
Qty: 200 | Refills: 0 | Status: DISCONTINUED | OUTPATIENT
Start: 2021-04-02 | End: 2021-04-03

## 2021-04-02 RX ORDER — POLYETHYLENE GLYCOL 3350 17 G/17G
17 POWDER, FOR SOLUTION ORAL DAILY
Refills: 0 | Status: DISCONTINUED | OUTPATIENT
Start: 2021-04-02 | End: 2021-04-09

## 2021-04-02 RX ORDER — MILRINONE LACTATE 1 MG/ML
0.2 INJECTION, SOLUTION INTRAVENOUS
Qty: 20 | Refills: 0 | Status: DISCONTINUED | OUTPATIENT
Start: 2021-04-02 | End: 2021-04-03

## 2021-04-02 RX ORDER — POTASSIUM CHLORIDE 20 MEQ
10 PACKET (EA) ORAL
Refills: 0 | Status: COMPLETED | OUTPATIENT
Start: 2021-04-02 | End: 2021-04-02

## 2021-04-02 RX ORDER — CHLORHEXIDINE GLUCONATE 213 G/1000ML
15 SOLUTION TOPICAL EVERY 12 HOURS
Refills: 0 | Status: DISCONTINUED | OUTPATIENT
Start: 2021-04-02 | End: 2021-04-03

## 2021-04-02 RX ORDER — ASPIRIN/CALCIUM CARB/MAGNESIUM 324 MG
300 TABLET ORAL ONCE
Refills: 0 | Status: COMPLETED | OUTPATIENT
Start: 2021-04-02 | End: 2021-04-02

## 2021-04-02 RX ORDER — SODIUM CHLORIDE 9 MG/ML
500 INJECTION, SOLUTION INTRAVENOUS ONCE
Refills: 0 | Status: COMPLETED | OUTPATIENT
Start: 2021-04-02 | End: 2021-04-02

## 2021-04-02 RX ORDER — POTASSIUM CHLORIDE 20 MEQ
10 PACKET (EA) ORAL
Refills: 0 | Status: DISCONTINUED | OUTPATIENT
Start: 2021-04-02 | End: 2021-04-03

## 2021-04-02 RX ORDER — CHLORHEXIDINE GLUCONATE 213 G/1000ML
1 SOLUTION TOPICAL
Refills: 0 | Status: DISCONTINUED | OUTPATIENT
Start: 2021-04-02 | End: 2021-04-04

## 2021-04-02 RX ORDER — CEFUROXIME AXETIL 250 MG
1500 TABLET ORAL EVERY 8 HOURS
Refills: 0 | Status: COMPLETED | OUTPATIENT
Start: 2021-04-02 | End: 2021-04-03

## 2021-04-02 RX ORDER — SODIUM CHLORIDE 9 MG/ML
1000 INJECTION INTRAMUSCULAR; INTRAVENOUS; SUBCUTANEOUS
Refills: 0 | Status: DISCONTINUED | OUTPATIENT
Start: 2021-04-02 | End: 2021-04-04

## 2021-04-02 RX ORDER — DOBUTAMINE HCL 250MG/20ML
2 VIAL (ML) INTRAVENOUS
Qty: 500 | Refills: 0 | Status: DISCONTINUED | OUTPATIENT
Start: 2021-04-02 | End: 2021-04-03

## 2021-04-02 RX ORDER — POTASSIUM CHLORIDE 20 MEQ
10 PACKET (EA) ORAL
Refills: 0 | Status: COMPLETED | OUTPATIENT
Start: 2021-04-02 | End: 2021-04-03

## 2021-04-02 RX ORDER — ASPIRIN/CALCIUM CARB/MAGNESIUM 324 MG
300 TABLET ORAL ONCE
Refills: 0 | Status: COMPLETED | OUTPATIENT
Start: 2021-04-02

## 2021-04-02 RX ORDER — PANTOPRAZOLE SODIUM 20 MG/1
40 TABLET, DELAYED RELEASE ORAL DAILY
Refills: 0 | Status: DISCONTINUED | OUTPATIENT
Start: 2021-04-02 | End: 2021-04-03

## 2021-04-02 RX ORDER — INSULIN HUMAN 100 [IU]/ML
3 INJECTION, SOLUTION SUBCUTANEOUS
Qty: 100 | Refills: 0 | Status: DISCONTINUED | OUTPATIENT
Start: 2021-04-02 | End: 2021-04-03

## 2021-04-02 RX ADMIN — MILRINONE LACTATE 3.34 MICROGRAM(S)/KG/MIN: 1 INJECTION, SOLUTION INTRAVENOUS at 16:57

## 2021-04-02 RX ADMIN — CHLORHEXIDINE GLUCONATE 30 MILLILITER(S): 213 SOLUTION TOPICAL at 04:36

## 2021-04-02 RX ADMIN — DEXMEDETOMIDINE HYDROCHLORIDE IN 0.9% SODIUM CHLORIDE 9.75 MICROGRAM(S)/KG/HR: 4 INJECTION INTRAVENOUS at 16:56

## 2021-04-02 RX ADMIN — SODIUM CHLORIDE 1000 MILLILITER(S): 9 INJECTION, SOLUTION INTRAVENOUS at 22:26

## 2021-04-02 RX ADMIN — CHLORHEXIDINE GLUCONATE 15 MILLILITER(S): 213 SOLUTION TOPICAL at 17:34

## 2021-04-02 RX ADMIN — Medication 125 MILLILITER(S): at 21:44

## 2021-04-02 RX ADMIN — Medication 100 MILLIEQUIVALENT(S): at 17:34

## 2021-04-02 RX ADMIN — Medication 100 MILLIGRAM(S): at 16:56

## 2021-04-02 RX ADMIN — Medication 25 MILLIGRAM(S): at 04:36

## 2021-04-02 RX ADMIN — Medication 100 MILLIEQUIVALENT(S): at 21:44

## 2021-04-02 RX ADMIN — Medication 100 MILLIEQUIVALENT(S): at 22:31

## 2021-04-02 RX ADMIN — Medication 3.34 MICROGRAM(S)/KG/MIN: at 16:57

## 2021-04-02 RX ADMIN — PANTOPRAZOLE SODIUM 40 MILLIGRAM(S): 20 TABLET, DELAYED RELEASE ORAL at 16:57

## 2021-04-02 RX ADMIN — Medication 100 MILLIEQUIVALENT(S): at 18:13

## 2021-04-02 RX ADMIN — SODIUM CHLORIDE 2000 MILLILITER(S): 9 INJECTION, SOLUTION INTRAVENOUS at 17:14

## 2021-04-02 RX ADMIN — SODIUM CHLORIDE 3 MILLILITER(S): 9 INJECTION INTRAMUSCULAR; INTRAVENOUS; SUBCUTANEOUS at 04:27

## 2021-04-02 RX ADMIN — Medication 300 MILLIGRAM(S): at 22:17

## 2021-04-02 RX ADMIN — Medication 10 MILLIGRAM(S): at 04:35

## 2021-04-02 RX ADMIN — Medication 100 MILLIEQUIVALENT(S): at 18:06

## 2021-04-02 RX ADMIN — SODIUM CHLORIDE 2000 MILLILITER(S): 9 INJECTION, SOLUTION INTRAVENOUS at 16:14

## 2021-04-02 RX ADMIN — INSULIN HUMAN 3 UNIT(S)/HR: 100 INJECTION, SOLUTION SUBCUTANEOUS at 16:56

## 2021-04-02 RX ADMIN — Medication 125 MILLILITER(S): at 19:50

## 2021-04-02 RX ADMIN — Medication 100 MILLIGRAM(S): at 23:12

## 2021-04-02 RX ADMIN — Medication 125 MILLILITER(S): at 20:45

## 2021-04-02 RX ADMIN — Medication 125 MILLILITER(S): at 18:13

## 2021-04-02 RX ADMIN — SODIUM CHLORIDE 10 MILLILITER(S): 9 INJECTION INTRAMUSCULAR; INTRAVENOUS; SUBCUTANEOUS at 16:56

## 2021-04-02 NOTE — PROGRESS NOTE ADULT - SUBJECTIVE AND OBJECTIVE BOX
JAMSHID IRVIN  MRN-[id number]  Patient is a 50y old  Female who presents with a chief complaint of TVD (2021 11:08)    HPI:  49y/o Smooth Female with pmhx of T2DM (diagnosed ) and Hypertension (diagnosed ) p/w worsening shortness of breath and chest pain for 1 wk on 3/23. Due to insurance issues, was not able to take home metformin. Last 1 week she was unable to perform her daily routines due to sob with minimal exertion with associated cough and orthopnea requiring 2-3 pillow to sleep. At [facility] ED, pt was found to be in DKA with glucose level in 600s, proBNP 2107, trop 111 with ST depression in V5/V6. Endo was consulted and was placed on insulin gtt and gradually transitioned to SQ insulin. Pt was given Lasix IV push and admitted to CCU on bipap and nitro gtt. TTE done on 3/24 showed EF 35-40%, moderate to severe LV systolic dysfunction, apical thrombus seen, and trace pericardial effusion, and b/l pleural effusions. Pt was warranted for ischemic evaluation and underwent cath on 3/25. LHC/RHC 3/25 showed PCW 30, CO 3.94, CI 2.42, pLAD 80% stenosis, Mid LAD 60% stenosis, Mid circumflex 70%, RCA 75% stenosis, RPDA 70% stenosis, RPLS 60% stenosis at ostium.    (29 Mar 2021 23:16)      Surgery/Hospital course:  3/23 DKA at [facility]   4/2 C3L, LEANA clip     Today:  S/p C3L and LEANA clip today, received 1U pRBCs intraop. Intubated on full vent support. ASA tonight, plan to start Plavix in the AM. Lactate elevated to 2.1 s/p volume resuscitation with LR and Albumin x1, will f/u.     REVIEW OF SYSTEMS:  Unable to obtain, pt intubated and sedated     Physical Exam:  Vital Signs Last 24 Hrs  T(C): 35.4 (2021 16:00), Max: 36.8 (2021 21:47)  T(F): 95.7 (2021 16:00), Max: 98.3 (2021 21:47)  HR: 100 (2021 19:00) (74 - 100)  BP: 104/72 (2021 05:06) (104/72 - 111/72)  BP(mean): 85 (2021 21:47) (85 - 85)  RR: 10 (2021 19:00) (10 - 18)  SpO2: 100% (2021 19:00) (98% - 100%)  Gen:  intubated    CNS: sedated 	  Neck: no JVD  RES : clear , no wheezing    Chest:   + chest tubes                     CVS: Regular  rhythm. Normal S1/S2  Abd: Soft, non-distended. Bowel sounds present.  Skin: No rash.  Ext:  no edema, A Line  ============================I/O===========================   I&O's Detail    2021 07:01  -  2021 07:00  --------------------------------------------------------  IN:    Heparin: 105 mL    Heparin: 63 mL    Oral Fluid: 1020 mL  Total IN: 1188 mL    OUT:    Voided (mL): 200 mL  Total OUT: 200 mL    Total NET: 988 mL      2021 07:01  -  2021 19:17  --------------------------------------------------------  IN:    Albumin 5%  - 250 mL: 250 mL    Dexmedetomidine: 27.8 mL    DOBUTamine: 17.5 mL    Insulin: 6 mL    IV PiggyBack: 200 mL    Lactated Ringers Bolus: 1000 mL    Milrinone: 9.9 mL    sodium chloride 0.9%: 40 mL  Total IN: 1551.2 mL    OUT:    Chest Tube (mL): 90 mL    Chest Tube (mL): 45 mL    Chest Tube (mL): 15 mL    Indwelling Catheter - Urethral (mL): 1225 mL  Total OUT: 1375 mL    Total NET: 176.2 mL        ============================ LABS =========================                        8.6    11.42 )-----------( 123      ( 2021 16:33 )             25.9     04-02    146<H>  |  110<H>  |  10  ----------------------------<  145<H>  4.2   |  21<L>  |  0.60    Ca    8.7      2021 16:33  Phos  4.3     04-  Mg     4.1     04-    TPro  4.8<L>  /  Alb  3.4  /  TBili  0.8  /  DBili  x   /  AST  26  /  ALT  7<L>  /  AlkPhos  36<L>      LIVER FUNCTIONS - ( 2021 16:33 )  Alb: 3.4 g/dL / Pro: 4.8 g/dL / ALK PHOS: 36 U/L / ALT: 7 U/L / AST: 26 U/L / GGT: x           PT/INR - ( 2021 16:33 )   PT: 14.4 sec;   INR: 1.21 ratio         PTT - ( 2021 16:33 )  PTT:28.3 sec  ABG - ( 2021 17:30 )  pH, Arterial: 7.45  pH, Blood: x     /  pCO2: 34    /  pO2: 215   / HCO3: 23    / Base Excess: .0    /  SaO2: 98                  ======================Micro/Rad/Cardio=================  CXR: Reviewed  Echo:Reviewed  ======================================================  PAST MEDICAL & SURGICAL HISTORY:  T2DM (type 2 diabetes mellitus)    Benign essential HTN    HTN (hypertension)    T2DM (type 2 diabetes mellitus)    H/O  section      ====================ASSESSMENT ==============  S/p C3L, LEANA clip on    CAD/ASHD  Hemodynamic instability   Lactic acidosis   Hypertension   Diabetes mellitus type 2 / DKA     Plan:  ====================== NEUROLOGY=====================  Sedated with IV Precedex to maintain vent synchrony   Tylenol and Oxycodone for analgesia PRN     acetaminophen   Tablet .. 650 milliGRAM(s) Oral every 6 hours PRN Mild Pain (1 - 3)  dexMEDEtomidine Infusion 0.7 MICROgram(s)/kG/Hr (9.75 mL/Hr) IV Continuous <Continuous>  oxyCODONE    IR 5 milliGRAM(s) Oral every 4 hours PRN Moderate Pain (4 - 6)    ==================== RESPIRATORY======================  Respiratory status required full ventilatory support, close monitoring of respiratory rate and breathing pattern, the following of ABG’s with A-line monitoring, continuous pulse oximetry monitoring    Mechanical Ventilation:  Mode: AC/ CMV (Assist Control/ Continuous Mandatory Ventilation)  RR (machine): 12  TV (machine): 500  FiO2: 40  PEEP: 5  ITime: 1  MAP: 10  PIP: 27      ====================CARDIOVASCULAR==================  CAD s/p C3L, LEANA clip   Continue invasive hemodynamic monitoring   Inotropic support with IV Dobutamine and IV Primacor   ASA for graft patency, plan to start Plavix in the AM   Lactate elevated to 2.1, s/p LR and albumin x1, will f/u   Temp V pacing wires in place      aspirin enteric coated 325 milliGRAM(s) Oral daily  DOBUTamine Infusion 2 MICROgram(s)/kG/Min (3.34 mL/Hr) IV Continuous <Continuous>  milrinone Infusion 0.2 MICROgram(s)/kG/Min (3.34 mL/Hr) IV Continuous <Continuous>    ===================HEMATOLOGIC/ONC ===================  S/p 1U pRBCs intraop, monitor H&H/Plts   Maintain and monitor chest tube output     ===================== RENAL =========================  Molina for monitoring urine output  Monitor I&Os and BUN/Cr     ==================== GASTROINTESTINAL===================  NPO post op, will advance diet as tolerated     GI prophylaxis, pantoprazole  Injectable 40 milliGRAM(s) IV Push daily  polyethylene glycol 3350 17 Gram(s) Oral daily    =======================    ENDOCRINE  =====================  Hx of DM2, with recent DKA at [facility], continue glucose control with IV insulin drip     insulin regular Infusion 3 Unit(s)/Hr (3 mL/Hr) IV Continuous <Continuous>    ========================INFECTIOUS DISEASE================  Perioperative coverage with cefuroxime     cefuroxime  IVPB 1500 milliGRAM(s) IV Intermittent every 8 hours      Patient requires continuous monitoring with:  bedside rhythm monitoring, arterial line, pulse oximetry, ventilator management and monitoring; intermittent blood gas analysis.  Care plan discussed with ICU care team.  patient remain critical; required more than usual post op care; I have spent 35 minutes providing non routine post op care, revaluated multiple times during the day .     By signing my name below, I, Kimberlyn Bhat, attest that this documentation has been prepared under the direction and in the presence of Martha Waters MD.  Electronically signed: Charles Cyr, 21 @ 19:17    I, Martha Waters, personally performed the services described in this documentation. all medical record entries made by the scribe were at my direction and in my presence. I have reviewed the chart and agree that the record reflects my personal performance and is accurate and complete  Electronically signed: Martha Waters, 21 @ 19:17       JAMSHID IRVIN  MRN-[id number]  Patient is a 50y old  Female who presents with a chief complaint of TVD (2021 11:08)    HPI:  51y/o Smooth Female with pmhx of T2DM (diagnosed ) and Hypertension (diagnosed ) p/w worsening shortness of breath and chest pain for 1 wk on 3/23. Due to insurance issues, was not able to take home metformin. Last 1 week she was unable to perform her daily routines due to sob with minimal exertion with associated cough and orthopnea requiring 2-3 pillow to sleep. At [facility] ED, pt was found to be in DKA with glucose level in 600s, proBNP 2107, trop 111 with ST depression in V5/V6. Endo was consulted and was placed on insulin gtt and gradually transitioned to SQ insulin. Pt was given Lasix IV push and admitted to CCU on bipap and nitro gtt. TTE done on 3/24 showed EF 35-40%, moderate to severe LV systolic dysfunction, apical thrombus seen, and trace pericardial effusion, and b/l pleural effusions. Pt was warranted for ischemic evaluation and underwent cath on 3/25. LHC/RHC 3/25 showed PCW 30, CO 3.94, CI 2.42, pLAD 80% stenosis, Mid LAD 60% stenosis, Mid circumflex 70%, RCA 75% stenosis, RPDA 70% stenosis, RPLS 60% stenosis at ostium.    (29 Mar 2021 23:16)      Surgery/Hospital course:  3/23 DKA at [facility]   2021 CABG x3 C3L, LEANA clip     Today:  S/p C3L and LEANA clip today, received 1U pRBCs intraop. Intubated on full vent support. ASA tonight, plan to start Plavix in the AM. Lactate elevated to 2.1 s/p volume resuscitation with LR and Albumin x1, will f/u.   remain intubated so far    REVIEW OF SYSTEMS:  Unable to obtain, pt intubated and sedated     Physical Exam:  Vital Signs Last 24 Hrs  T(C): 35.4 (2021 16:00), Max: 36.8 (2021 21:47)  T(F): 95.7 (2021 16:00), Max: 98.3 (2021 21:47)  HR: 100 (2021 19:00) (74 - 100)  BP: 104/72 (2021 05:06) (104/72 - 111/72)  BP(mean): 85 (2021 21:47) (85 - 85)  RR: 10 (2021 19:00) (10 - 18)  SpO2: 100% (2021 19:00) (98% - 100%)  Gen:  intubated    CNS: sedated 	  Neck: no JVD  RES : clear , no wheezing    Chest:   + chest tubes                     CVS: Regular  rhythm. Normal S1/S2  Abd: Soft, non-distended. Bowel sounds present.  Skin: No rash.  Ext:  no edema, A Line  ============================I/O===========================   I&O's Detail    2021 07:01  -  2021 07:00  --------------------------------------------------------  IN:    Heparin: 105 mL    Heparin: 63 mL    Oral Fluid: 1020 mL  Total IN: 1188 mL    OUT:    Voided (mL): 200 mL  Total OUT: 200 mL    Total NET: 988 mL      2021 07:01  -  2021 19:17  --------------------------------------------------------  IN:    Albumin 5%  - 250 mL: 250 mL    Dexmedetomidine: 27.8 mL    DOBUTamine: 17.5 mL    Insulin: 6 mL    IV PiggyBack: 200 mL    Lactated Ringers Bolus: 1000 mL    Milrinone: 9.9 mL    sodium chloride 0.9%: 40 mL  Total IN: 1551.2 mL    OUT:    Chest Tube (mL): 90 mL    Chest Tube (mL): 45 mL    Chest Tube (mL): 15 mL    Indwelling Catheter - Urethral (mL): 1225 mL  Total OUT: 1375 mL    Total NET: 176.2 mL        ============================ LABS =========================                        8.6    11.42 )-----------( 123      ( 2021 16:33 )             25.9     04-02    146<H>  |  110<H>  |  10  ----------------------------<  145<H>  4.2   |  21<L>  |  0.60    Ca    8.7      2021 16:33  Phos  4.3     04-02  Mg     4.1     04-02    TPro  4.8<L>  /  Alb  3.4  /  TBili  0.8  /  DBili  x   /  AST  26  /  ALT  7<L>  /  AlkPhos  36<L>  04-02    LIVER FUNCTIONS - ( 2021 16:33 )  Alb: 3.4 g/dL / Pro: 4.8 g/dL / ALK PHOS: 36 U/L / ALT: 7 U/L / AST: 26 U/L / GGT: x           PT/INR - ( 2021 16:33 )   PT: 14.4 sec;   INR: 1.21 ratio         PTT - ( 2021 16:33 )  PTT:28.3 sec  ABG - ( 2021 17:30 )  pH, Arterial: 7.45  pH, Blood: x     /  pCO2: 34    /  pO2: 215   / HCO3: 23    / Base Excess: .0    /  SaO2: 98                  ======================Micro/Rad/Cardio=================  CXR: Reviewed  Echo:Reviewed  ======================================================  PAST MEDICAL & SURGICAL HISTORY:  T2DM (type 2 diabetes mellitus)    Benign essential HTN    HTN (hypertension)    T2DM (type 2 diabetes mellitus)    H/O  section      ====================ASSESSMENT ==============  2021 CABG x3 C3L, LEANA clip   acute on chronic systolic CHF  anemia blood loss  Hemodynamic instability   Lactic acidosis   Hyperglycemia  CAD/ASHD  Hypertension   Diabetes mellitus type 2 / DKA     Plan:  ====================== NEUROLOGY=====================  Sedated with IV Precedex to maintain vent synchrony   Tylenol and Oxycodone for analgesia PRN     acetaminophen   Tablet .. 650 milliGRAM(s) Oral every 6 hours PRN Mild Pain (1 - 3)  dexMEDEtomidine Infusion 0.7 MICROgram(s)/kG/Hr (9.75 mL/Hr) IV Continuous <Continuous>  oxyCODONE    IR 5 milliGRAM(s) Oral every 4 hours PRN Moderate Pain (4 - 6)    ==================== RESPIRATORY======================  Respiratory status required full ventilatory support, close monitoring of respiratory rate and breathing pattern, the following of ABG’s with A-line monitoring, continuous pulse oximetry monitoring    Mechanical Ventilation:  Mode: AC/ CMV (Assist Control/ Continuous Mandatory Ventilation)  RR (machine): 12  TV (machine): 500  FiO2: 40  PEEP: 5  weaning on cpap 15/5 , pt still sleepy, will proceed carefully      ====================CARDIOVASCULAR==================  CAD s/p C3L, LEANA clip   Continue invasive hemodynamic monitoring   Inotropic support with IV Dobutamine and IV Primacor ( hemodynamic instability)  ASA for graft patency, plan to start Plavix in the AM   Lactate elevated to 2.1, s/p LR and albumin x1, will f/u   Temp V pacing wires in place      aspirin enteric coated 325 milliGRAM(s) Oral daily; Plavix in am, check platelets count  DOBUTamine Infusion 2 MICROgram(s)/kG/Min (3.34 mL/Hr) IV Continuous <Continuous>  milrinone Infusion 0.2 MICROgram(s)/kG/Min (3.34 mL/Hr) IV Continuous <Continuous>    ===================HEMATOLOGIC/ONC ===================  S/p 1U pRBCs intraop, monitor H&H/Plts   Maintain and monitor chest tube output     ===================== RENAL =========================  Molina for monitoring urine output  Monitor I&Os and BUN/Cr     ==================== GASTROINTESTINAL===================  NPO post op, will advance diet as tolerated     GI prophylaxis, pantoprazole  Injectable 40 milliGRAM(s) IV Push daily  polyethylene glycol 3350 17 Gram(s) Oral daily    =======================    ENDOCRINE  =====================  Hx of DM2, with recent DKA at [facility], continue glucose control with IV insulin drip     insulin regular Infusion 3 Unit(s)/Hr (3 mL/Hr) IV Continuous <Continuous>    ========================INFECTIOUS DISEASE================  Perioperative coverage with cefuroxime     cefuroxime  IVPB 1500 milliGRAM(s) IV Intermittent every 8 hours      Patient requires continuous monitoring with:  bedside rhythm monitoring, arterial line, pulse oximetry, ventilator management and monitoring; intermittent blood gas analysis.  Care plan discussed with ICU care team.  patient remain critical; required more than usual post op care; I have spent 40 minutes providing non routine post op care, revaluated multiple times during the day .     By signing my name below, I, Kimberlyn Bhat, attest that this documentation has been prepared under the direction and in the presence of Martha Waters MD.  Electronically signed: Karel Cyr, 21 @ 19:17    I, Martha Waters, personally performed the services described in this documentation. all medical record entries made by the karel were at my direction and in my presence. I have reviewed the chart and agree that the record reflects my personal performance and is accurate and complete  Electronically signed: Martha Waters, 21 @ 19:17

## 2021-04-02 NOTE — AIRWAY REMOVAL NOTE  ADULT & PEDS - ARTIFICAL AIRWAY REMOVAL COMMENTS
Written order for extubation verified. The patient was identified by full name and birth date compared to the identification band. Present during the procedure was RT Barney and RN Adina TOLENTINO

## 2021-04-02 NOTE — BRIEF OPERATIVE NOTE - OPERATION/FINDINGS
Coronary artery disease, CABG x 3 (LIMA-LAD, SVG-PDA, SVG-RCA)  Left atrial appendage ligation with 45 mm Atri-Clip  R pleura opened, BLAKE take-down aborted  2 mediastinal chest tubes, R pleural CT  1 V wire  1 PRBC on bypass  Bypass: 167 min. XClamp: 124 min

## 2021-04-02 NOTE — BRIEF OPERATIVE NOTE - NSICDXBRIEFPROCEDURE_GEN_ALL_CORE_FT
PROCEDURES:  Bypass graft, coronary artery, 1 arterial and 2 venous 02-Apr-2021 16:12:57  Bereket Champion  Ligation, left atrial appendage 02-Apr-2021 16:13:11  Bereket Champion

## 2021-04-03 LAB
ALBUMIN SERPL ELPH-MCNC: 3.9 G/DL — SIGNIFICANT CHANGE UP (ref 3.3–5)
ALP SERPL-CCNC: 33 U/L — LOW (ref 40–120)
ALT FLD-CCNC: 9 U/L — LOW (ref 10–45)
ANION GAP SERPL CALC-SCNC: 13 MMOL/L — SIGNIFICANT CHANGE UP (ref 5–17)
APTT BLD: 63.3 SEC — HIGH (ref 27.5–35.5)
AST SERPL-CCNC: 39 U/L — SIGNIFICANT CHANGE UP (ref 10–40)
BASE EXCESS BLDV CALC-SCNC: -0.4 MMOL/L — SIGNIFICANT CHANGE UP (ref -2–2)
BASE EXCESS BLDV CALC-SCNC: 0.7 MMOL/L — SIGNIFICANT CHANGE UP (ref -2–2)
BILIRUB SERPL-MCNC: 1.5 MG/DL — HIGH (ref 0.2–1.2)
BUN SERPL-MCNC: 11 MG/DL — SIGNIFICANT CHANGE UP (ref 7–23)
CA-I SERPL-SCNC: 1.19 MMOL/L — SIGNIFICANT CHANGE UP (ref 1.12–1.3)
CALCIUM SERPL-MCNC: 8.7 MG/DL — SIGNIFICANT CHANGE UP (ref 8.4–10.5)
CHLORIDE BLDV-SCNC: 107 MMOL/L — SIGNIFICANT CHANGE UP (ref 96–108)
CHLORIDE SERPL-SCNC: 112 MMOL/L — HIGH (ref 96–108)
CO2 BLDV-SCNC: 26 MMOL/L — SIGNIFICANT CHANGE UP (ref 22–30)
CO2 BLDV-SCNC: 26 MMOL/L — SIGNIFICANT CHANGE UP (ref 22–30)
CO2 SERPL-SCNC: 21 MMOL/L — LOW (ref 22–31)
CREAT SERPL-MCNC: 0.63 MG/DL — SIGNIFICANT CHANGE UP (ref 0.5–1.3)
GAS PNL BLDA: SIGNIFICANT CHANGE UP
GAS PNL BLDV: 134 MMOL/L — LOW (ref 135–145)
GAS PNL BLDV: SIGNIFICANT CHANGE UP
GLUCOSE BLDC GLUCOMTR-MCNC: 103 MG/DL — HIGH (ref 70–99)
GLUCOSE BLDC GLUCOMTR-MCNC: 108 MG/DL — HIGH (ref 70–99)
GLUCOSE BLDC GLUCOMTR-MCNC: 122 MG/DL — HIGH (ref 70–99)
GLUCOSE BLDC GLUCOMTR-MCNC: 127 MG/DL — HIGH (ref 70–99)
GLUCOSE BLDC GLUCOMTR-MCNC: 130 MG/DL — HIGH (ref 70–99)
GLUCOSE BLDC GLUCOMTR-MCNC: 135 MG/DL — HIGH (ref 70–99)
GLUCOSE BLDC GLUCOMTR-MCNC: 137 MG/DL — HIGH (ref 70–99)
GLUCOSE BLDC GLUCOMTR-MCNC: 143 MG/DL — HIGH (ref 70–99)
GLUCOSE BLDC GLUCOMTR-MCNC: 78 MG/DL — SIGNIFICANT CHANGE UP (ref 70–99)
GLUCOSE BLDC GLUCOMTR-MCNC: 84 MG/DL — SIGNIFICANT CHANGE UP (ref 70–99)
GLUCOSE BLDC GLUCOMTR-MCNC: 85 MG/DL — SIGNIFICANT CHANGE UP (ref 70–99)
GLUCOSE BLDC GLUCOMTR-MCNC: 92 MG/DL — SIGNIFICANT CHANGE UP (ref 70–99)
GLUCOSE BLDV-MCNC: 130 MG/DL — HIGH (ref 70–99)
GLUCOSE SERPL-MCNC: 147 MG/DL — HIGH (ref 70–99)
HCO3 BLDV-SCNC: 25 MMOL/L — SIGNIFICANT CHANGE UP (ref 21–29)
HCO3 BLDV-SCNC: 25 MMOL/L — SIGNIFICANT CHANGE UP (ref 21–29)
HCT VFR BLD CALC: 27.9 % — LOW (ref 34.5–45)
HCT VFR BLDA CALC: 30 % — LOW (ref 39–50)
HGB BLD CALC-MCNC: 9.9 G/DL — LOW (ref 11.5–15.5)
HGB BLD-MCNC: 9 G/DL — LOW (ref 11.5–15.5)
HOROWITZ INDEX BLDV+IHG-RTO: 21 — SIGNIFICANT CHANGE UP
HOROWITZ INDEX BLDV+IHG-RTO: 36 — SIGNIFICANT CHANGE UP
INR BLD: 1.29 RATIO — HIGH (ref 0.88–1.16)
LACTATE BLDV-MCNC: 1.3 MMOL/L — SIGNIFICANT CHANGE UP (ref 0.7–2)
MAGNESIUM SERPL-MCNC: 2.7 MG/DL — HIGH (ref 1.6–2.6)
MCHC RBC-ENTMCNC: 26.7 PG — LOW (ref 27–34)
MCHC RBC-ENTMCNC: 32.3 GM/DL — SIGNIFICANT CHANGE UP (ref 32–36)
MCV RBC AUTO: 82.8 FL — SIGNIFICANT CHANGE UP (ref 80–100)
NRBC # BLD: 0 /100 WBCS — SIGNIFICANT CHANGE UP (ref 0–0)
OTHER CELLS CSF MANUAL: 8 ML/DL — LOW (ref 18–22)
PCO2 BLDV: 41 MMHG — SIGNIFICANT CHANGE UP (ref 39–42)
PCO2 BLDV: 47 MMHG — HIGH (ref 39–42)
PH BLDV: 7.34 — LOW (ref 7.35–7.45)
PH BLDV: 7.4 — SIGNIFICANT CHANGE UP (ref 7.35–7.45)
PHOSPHATE SERPL-MCNC: 3.3 MG/DL — SIGNIFICANT CHANGE UP (ref 2.5–4.5)
PLATELET # BLD AUTO: 121 K/UL — LOW (ref 150–400)
PO2 BLDV: 31 MMHG — SIGNIFICANT CHANGE UP (ref 25–45)
PO2 BLDV: 32 MMHG — SIGNIFICANT CHANGE UP (ref 25–45)
POTASSIUM BLDV-SCNC: 4.2 MMOL/L — SIGNIFICANT CHANGE UP (ref 3.5–5.3)
POTASSIUM SERPL-MCNC: 4.6 MMOL/L — SIGNIFICANT CHANGE UP (ref 3.5–5.3)
POTASSIUM SERPL-SCNC: 4.6 MMOL/L — SIGNIFICANT CHANGE UP (ref 3.5–5.3)
PROT SERPL-MCNC: 5.2 G/DL — LOW (ref 6–8.3)
PROTHROM AB SERPL-ACNC: 15.3 SEC — HIGH (ref 10.6–13.6)
RBC # BLD: 3.37 M/UL — LOW (ref 3.8–5.2)
RBC # FLD: 13.6 % — SIGNIFICANT CHANGE UP (ref 10.3–14.5)
SAO2 % BLDV: 58 % — LOW (ref 67–88)
SAO2 % BLDV: 58 % — LOW (ref 67–88)
SODIUM SERPL-SCNC: 146 MMOL/L — HIGH (ref 135–145)
WBC # BLD: 7.41 K/UL — SIGNIFICANT CHANGE UP (ref 3.8–10.5)
WBC # FLD AUTO: 7.41 K/UL — SIGNIFICANT CHANGE UP (ref 3.8–10.5)

## 2021-04-03 PROCEDURE — 93010 ELECTROCARDIOGRAM REPORT: CPT

## 2021-04-03 PROCEDURE — 71045 X-RAY EXAM CHEST 1 VIEW: CPT | Mod: 26

## 2021-04-03 PROCEDURE — 99291 CRITICAL CARE FIRST HOUR: CPT

## 2021-04-03 RX ORDER — INSULIN LISPRO 100/ML
VIAL (ML) SUBCUTANEOUS AT BEDTIME
Refills: 0 | Status: DISCONTINUED | OUTPATIENT
Start: 2021-04-03 | End: 2021-04-08

## 2021-04-03 RX ORDER — DIGOXIN 250 MCG
500 TABLET ORAL ONCE
Refills: 0 | Status: COMPLETED | OUTPATIENT
Start: 2021-04-03 | End: 2021-04-03

## 2021-04-03 RX ORDER — INSULIN GLARGINE 100 [IU]/ML
18 INJECTION, SOLUTION SUBCUTANEOUS AT BEDTIME
Refills: 0 | Status: DISCONTINUED | OUTPATIENT
Start: 2021-04-03 | End: 2021-04-03

## 2021-04-03 RX ORDER — INSULIN LISPRO 100/ML
6 VIAL (ML) SUBCUTANEOUS
Refills: 0 | Status: DISCONTINUED | OUTPATIENT
Start: 2021-04-03 | End: 2021-04-06

## 2021-04-03 RX ORDER — ENOXAPARIN SODIUM 100 MG/ML
40 INJECTION SUBCUTANEOUS DAILY
Refills: 0 | Status: DISCONTINUED | OUTPATIENT
Start: 2021-04-03 | End: 2021-04-09

## 2021-04-03 RX ORDER — ACETAMINOPHEN 500 MG
1000 TABLET ORAL ONCE
Refills: 0 | Status: COMPLETED | OUTPATIENT
Start: 2021-04-03 | End: 2021-04-03

## 2021-04-03 RX ORDER — METOPROLOL TARTRATE 50 MG
25 TABLET ORAL
Refills: 0 | Status: DISCONTINUED | OUTPATIENT
Start: 2021-04-03 | End: 2021-04-06

## 2021-04-03 RX ORDER — PANTOPRAZOLE SODIUM 20 MG/1
40 TABLET, DELAYED RELEASE ORAL
Refills: 0 | Status: DISCONTINUED | OUTPATIENT
Start: 2021-04-03 | End: 2021-04-09

## 2021-04-03 RX ORDER — INSULIN GLARGINE 100 [IU]/ML
10 INJECTION, SOLUTION SUBCUTANEOUS AT BEDTIME
Refills: 0 | Status: DISCONTINUED | OUTPATIENT
Start: 2021-04-03 | End: 2021-04-05

## 2021-04-03 RX ORDER — HUMAN INSULIN 100 [IU]/ML
8 INJECTION, SUSPENSION SUBCUTANEOUS ONCE
Refills: 0 | Status: COMPLETED | OUTPATIENT
Start: 2021-04-03 | End: 2021-04-03

## 2021-04-03 RX ORDER — INSULIN LISPRO 100/ML
VIAL (ML) SUBCUTANEOUS
Refills: 0 | Status: DISCONTINUED | OUTPATIENT
Start: 2021-04-03 | End: 2021-04-08

## 2021-04-03 RX ORDER — DIGOXIN 250 MCG
250 TABLET ORAL EVERY 6 HOURS
Refills: 0 | Status: COMPLETED | OUTPATIENT
Start: 2021-04-03 | End: 2021-04-03

## 2021-04-03 RX ORDER — CLOPIDOGREL BISULFATE 75 MG/1
75 TABLET, FILM COATED ORAL DAILY
Refills: 0 | Status: DISCONTINUED | OUTPATIENT
Start: 2021-04-03 | End: 2021-04-09

## 2021-04-03 RX ORDER — METOPROLOL TARTRATE 50 MG
5 TABLET ORAL ONCE
Refills: 0 | Status: COMPLETED | OUTPATIENT
Start: 2021-04-03 | End: 2021-04-03

## 2021-04-03 RX ORDER — ATORVASTATIN CALCIUM 80 MG/1
80 TABLET, FILM COATED ORAL AT BEDTIME
Refills: 0 | Status: DISCONTINUED | OUTPATIENT
Start: 2021-04-03 | End: 2021-04-09

## 2021-04-03 RX ADMIN — CHLORHEXIDINE GLUCONATE 15 MILLILITER(S): 213 SOLUTION TOPICAL at 05:09

## 2021-04-03 RX ADMIN — Medication 1000 MILLIGRAM(S): at 16:30

## 2021-04-03 RX ADMIN — Medication 325 MILLIGRAM(S): at 12:44

## 2021-04-03 RX ADMIN — Medication 5 MILLIGRAM(S): at 16:40

## 2021-04-03 RX ADMIN — Medication 100 MILLIGRAM(S): at 13:45

## 2021-04-03 RX ADMIN — Medication 6 UNIT(S): at 18:12

## 2021-04-03 RX ADMIN — Medication 100 MILLIGRAM(S): at 21:20

## 2021-04-03 RX ADMIN — Medication 400 MILLIGRAM(S): at 16:00

## 2021-04-03 RX ADMIN — Medication 500 MICROGRAM(S): at 08:47

## 2021-04-03 RX ADMIN — Medication 400 MILLIGRAM(S): at 06:36

## 2021-04-03 RX ADMIN — Medication 250 MICROGRAM(S): at 19:44

## 2021-04-03 RX ADMIN — OXYCODONE HYDROCHLORIDE 5 MILLIGRAM(S): 5 TABLET ORAL at 20:20

## 2021-04-03 RX ADMIN — OXYCODONE HYDROCHLORIDE 5 MILLIGRAM(S): 5 TABLET ORAL at 19:50

## 2021-04-03 RX ADMIN — PANTOPRAZOLE SODIUM 40 MILLIGRAM(S): 20 TABLET, DELAYED RELEASE ORAL at 06:41

## 2021-04-03 RX ADMIN — ENOXAPARIN SODIUM 40 MILLIGRAM(S): 100 INJECTION SUBCUTANEOUS at 13:44

## 2021-04-03 RX ADMIN — CLOPIDOGREL BISULFATE 75 MILLIGRAM(S): 75 TABLET, FILM COATED ORAL at 13:45

## 2021-04-03 RX ADMIN — HUMAN INSULIN 8 UNIT(S): 100 INJECTION, SUSPENSION SUBCUTANEOUS at 10:15

## 2021-04-03 RX ADMIN — Medication 250 MICROGRAM(S): at 13:45

## 2021-04-03 RX ADMIN — Medication 1000 MILLIGRAM(S): at 07:06

## 2021-04-03 RX ADMIN — Medication 6 UNIT(S): at 13:15

## 2021-04-03 RX ADMIN — CHLORHEXIDINE GLUCONATE 1 APPLICATION(S): 213 SOLUTION TOPICAL at 05:33

## 2021-04-03 RX ADMIN — Medication 25 MILLIGRAM(S): at 18:29

## 2021-04-03 RX ADMIN — Medication 100 MILLIGRAM(S): at 05:09

## 2021-04-03 RX ADMIN — ATORVASTATIN CALCIUM 80 MILLIGRAM(S): 80 TABLET, FILM COATED ORAL at 21:20

## 2021-04-03 NOTE — PHYSICAL THERAPY INITIAL EVALUATION ADULT - PLANNED THERAPY INTERVENTIONS, PT EVAL
Stair negotiation GOAL: patient will negotiate up / down 4 steps independently in 2 weeks/bed mobility training/gait training/transfer training

## 2021-04-03 NOTE — PROGRESS NOTE ADULT - SUBJECTIVE AND OBJECTIVE BOX
JAMSHID IRVIN  MRN-[id number]  Patient is a 50y old  Female who presents with a chief complaint of TVD (2021 19:17)      HPI:  51y/o Smooth Female with pmhx of T2DM (diagnosed ) and Hypertension (diagnosed ) p/w worsening shortness of breath and chest pain for 1 wk on 3/23. Due to insurance issues, was not able to take home metformin. Last 1 week she was unable to perform her daily routines due to sob with minimal exertion with associated cough and orthopnea requiring 2-3 pillow to sleep. At [facility] ED, pt was found to be in DKA with glucose level in 600s, proBNP 2107, trop 111 with ST depression in V5/V6. Endo was consulted and was placed on insulin gtt and gradually transitioned to SQ insulin. Pt was given Lasix IV push and admitted to CCU on bipap and nitro gtt. TTE done on 3/24 showed EF 35-40%, moderate to severe LV systolic dysfunction, apical thrombus seen, and trace pericardial effusion, and b/l pleural effusions. Pt was warranted for ischemic evaluation and underwent cath on 3/25. LHC/RHC 3/25 showed PCW 30, CO 3.94, CI 2.42, pLAD 80% stenosis, Mid LAD 60% stenosis, Mid circumflex 70%, RCA 75% stenosis, RPDA 70% stenosis, RPLS 60% stenosis at ostium.    (29 Mar 2021 23:16)      Surgery/Hospital Course:  3/23 DKA at [facility]   4/ CABG x3, LEANA clip     Today:  Extubated overnight     ICU Vital Signs Last 24 Hrs  T(C): 36.8 (2021 04:00), Max: 36.8 (2021 04:00)  T(F): 98.2 (2021 04:00), Max: 98.2 (2021 04:00)  HR: 95 (2021 05:45) (80 - 113)  BP: --  BP(mean): --  ABP: 114/55 (2021 05:45) (98/40 - 156/71)  ABP(mean): 69 (2021 05:45) (60 - 96)  RR: 27 (2021 05:45) (4 - 29)  SpO2: 100% (2021 05:45) (100% - 100%)      Physical Exam:  Gen:  NAD   CNS: sedated 	  Neck: no JVD  RES : clear , no wheezing    Chest:   + chest tubes                     CVS: Regular  rhythm. Normal S1/S2  Abd: Soft, non-distended. Bowel sounds present.  Skin: No rash.  Ext:  no edema, A Line    ============================I/O===========================   I&O's Detail    2021 07:01  -  2021 07:00  --------------------------------------------------------  IN:    Heparin: 105 mL    Heparin: 63 mL    Oral Fluid: 1020 mL  Total IN: 1188 mL    OUT:    Voided (mL): 200 mL  Total OUT: 200 mL    Total NET: 988 mL      2021 07:01  -  2021 06:09  --------------------------------------------------------  IN:    Albumin 5%  - 250 mL: 1000 mL    Dexmedetomidine: 27.8 mL    DOBUTamine: 63.7 mL    Insulin: 42 mL    IV PiggyBack: 350 mL    Lactated Ringers Bolus: 1750 mL    Milrinone: 46.2 mL    PRBCs (Packed Red Blood Cells): 300 mL    sodium chloride 0.9%: 150 mL  Total IN: 3729.7 mL    OUT:    Chest Tube (mL): 110 mL    Chest Tube (mL): 120 mL    Chest Tube (mL): 210 mL    Indwelling Catheter - Urethral (mL): 1985 mL  Total OUT: 2425 mL    Total NET: 1304.7 mL        ============================ LABS =========================                        9.0    7.41  )-----------( 121      ( 2021 00:39 )             27.9     04-03    146<H>  |  112<H>  |  11  ----------------------------<  147<H>  4.6   |  21<L>  |  0.63    Ca    8.7      2021 00:39  Phos  3.3     04-03  Mg     2.7     04-03    TPro  5.2<L>  /  Alb  3.9  /  TBili  1.5<H>  /  DBili  x   /  AST  39  /  ALT  9<L>  /  AlkPhos  33<L>  04-03    LIVER FUNCTIONS - ( 2021 00:39 )  Alb: 3.9 g/dL / Pro: 5.2 g/dL / ALK PHOS: 33 U/L / ALT: 9 U/L / AST: 39 U/L / GGT: x           PT/INR - ( 2021 00:39 )   PT: 15.3 sec;   INR: 1.29 ratio         PTT - ( 2021 00:39 )  PTT:63.3 sec  ABG - ( 2021 05:54 )  pH, Arterial: 7.42  pH, Blood: x     /  pCO2: 36    /  pO2: 145   / HCO3: 22    / Base Excess: -1.4  /  SaO2: 100                 ======================Micro/Rad/Cardio=================  CXR: Reviewed  Echo:Reviewed  ======================================================  PAST MEDICAL & SURGICAL HISTORY:  T2DM (type 2 diabetes mellitus)    Benign essential HTN    HTN (hypertension)    T2DM (type 2 diabetes mellitus)    H/O  section      ====================ASSESSMENT ==============  2021 CABG x3 C3L, LEANA clip   Acute on chronic systolic CHF  Anemia blood loss  Hemodynamic instability   Lactic acidosis   CAD/ASHD  Hypertension   Diabetes mellitus type 2 / DKA   Hypovolemic shock  Post op respiratory insufficiency     Plan:  ====================== NEUROLOGY=====================  Sedated with IV Precedex post extubation  Tylenol and Oxycodone for analgesia PRN     acetaminophen   Tablet .. 650 milliGRAM(s) Oral every 6 hours PRN Mild Pain (1 - 3)  dexMEDEtomidine Infusion 0.7 MICROgram(s)/kG/Hr (9.75 mL/Hr) IV Continuous <Continuous>  oxyCODONE    IR 5 milliGRAM(s) Oral every 4 hours PRN Moderate Pain (4 - 6)    ==================== RESPIRATORY======================  Extubated overnight, supplemental O2 via NC   Encourage incentive spirometry, continue pulse ox monitoring, follow ABGs     ====================CARDIOVASCULAR==================  CAD s/p C3L, LEANA clip   Continue invasive hemodynamic monitoring   Inotropic support with IV Dobutamine and IV Primacor   Lactate peaked to 3.4 overnight, now 1.8   Temporary V pacing wires in place   ASA for graft patency, plan to start Plavix this AM     aspirin enteric coated 325 milliGRAM(s) Oral daily  DOBUTamine Infusion 2 MICROgram(s)/kG/Min (3.34 mL/Hr) IV Continuous <Continuous>  milrinone Infusion 0.2 MICROgram(s)/kG/Min (3.34 mL/Hr) IV Continuous <Continuous>    ===================HEMATOLOGIC/ONC ===================  Acute blood loss anemia, monitor H&H/Plts      ===================== RENAL =========================  Continue monitoring urine output, I&OS, BUN/Cr     ==================== GASTROINTESTINAL===================  Tolerating consistent carb diet   Bowel regimen with Miralax     GI prophylaxis, pantoprazole  Injectable 40 milliGRAM(s) IV Push daily  polyethylene glycol 3350 17 Gram(s) Oral daily  potassium chloride  10 mEq/50 mL IVPB 10 milliEquivalent(s) IV Intermittent every 1 hour  sodium chloride 0.9%. 1000 milliLiter(s) (10 mL/Hr) IV Continuous <Continuous>    =======================    ENDOCRINE  =====================  Hx of DM2, recent DKA, continue glycemic control with IV insulin drip     insulin regular Infusion 3 Unit(s)/Hr (3 mL/Hr) IV Continuous <Continuous>    ========================INFECTIOUS DISEASE================  Perioperative coverage with cefuroxime     cefuroxime  IVPB 1500 milliGRAM(s) IV Intermittent every 8 hours        I have spent 35 minutes providing acute care for this critically ill patient     Patient requires continuous monitoring with bedside rhythm monitoring, pulse ox monitoring, and intermittent blood gas analysis. Care plan discussed with ICU care team. Patient remained critical and at risk for life threatening decompensation.     By signing my name below, I, Kimberlyn Bhat, attest that this documentation has been prepared under the direction and in the presence of Santhosh Hernandez MD   Electronically signed: Karel Cyr, 21 @ 06:09    I, Santhosh Hernandez, personally performed the services described in this documentation. all medical record entries made by the karel were at my direction and in my presence. I have reviewed the chart and agree that the record reflects my personal performance and is accurate and complete  Electronically signed: Santhosh Hernandez MD        JAMSHID IRVIN  MRN-[id number]  Patient is a 50y old  Female who presents with a chief complaint of TVD (2021 19:17)      HPI:  49y/o Smooth Female with pmhx of T2DM (diagnosed ) and Hypertension (diagnosed ) p/w worsening shortness of breath and chest pain for 1 wk on 3/23. Due to insurance issues, was not able to take home metformin. Last 1 week she was unable to perform her daily routines due to sob with minimal exertion with associated cough and orthopnea requiring 2-3 pillow to sleep. At [facility] ED, pt was found to be in DKA with glucose level in 600s, proBNP 2107, trop 111 with ST depression in V5/V6. Endo was consulted and was placed on insulin gtt and gradually transitioned to SQ insulin. Pt was given Lasix IV push and admitted to CCU on bipap and nitro gtt. TTE done on 3/24 showed EF 35-40%, moderate to severe LV systolic dysfunction, apical thrombus seen, and trace pericardial effusion, and b/l pleural effusions. Pt was warranted for ischemic evaluation and underwent cath on 3/25. LHC/RHC 3/25 showed PCW 30, CO 3.94, CI 2.42, pLAD 80% stenosis, Mid LAD 60% stenosis, Mid circumflex 70%, RCA 75% stenosis, RPDA 70% stenosis, RPLS 60% stenosis at ostium.    (29 Mar 2021 23:16)      Surgery/Hospital Course:  3/23 DKA at [facility]   4/2 CABG x3, LEANA clip     Today:  Dobutamine now off, Primacor at 0.2, will maintain throughout the weekend. Digoxin load for afib prophylaxis as per Dr. Baig. Ambulate as tolerated. Will give 8U NPH to bridge off insulin drip, then start Lantus and premeal tonight.     ICU Vital Signs Last 24 Hrs  T(C): 36.8 (2021 04:00), Max: 36.8 (2021 04:00)  T(F): 98.2 (2021 04:00), Max: 98.2 (2021 04:00)  HR: 95 (2021 05:45) (80 - 113)  BP: --  BP(mean): --  ABP: 114/55 (2021 05:45) (98/40 - 156/71)  ABP(mean): 69 (2021 05:45) (60 - 96)  RR: 27 (2021 05:45) (4 - 29)  SpO2: 100% (2021 05:45) (100% - 100%)      Physical Exam:  Gen:  NAD   CNS: sedated 	  Neck: no JVD  RES : clear , no wheezing    Chest:   + chest tubes                     CVS: Regular  rhythm. Normal S1/S2  Abd: Soft, non-distended. Bowel sounds present.  Skin: No rash.  Ext:  no edema, A Line    ============================I/O===========================   I&O's Detail    2021 07:01  -  2021 07:00  --------------------------------------------------------  IN:    Heparin: 105 mL    Heparin: 63 mL    Oral Fluid: 1020 mL  Total IN: 1188 mL    OUT:    Voided (mL): 200 mL  Total OUT: 200 mL    Total NET: 988 mL      2021 07:01  -  2021 06:09  --------------------------------------------------------  IN:    Albumin 5%  - 250 mL: 1000 mL    Dexmedetomidine: 27.8 mL    DOBUTamine: 63.7 mL    Insulin: 42 mL    IV PiggyBack: 350 mL    Lactated Ringers Bolus: 1750 mL    Milrinone: 46.2 mL    PRBCs (Packed Red Blood Cells): 300 mL    sodium chloride 0.9%: 150 mL  Total IN: 3729.7 mL    OUT:    Chest Tube (mL): 110 mL    Chest Tube (mL): 120 mL    Chest Tube (mL): 210 mL    Indwelling Catheter - Urethral (mL): 1985 mL  Total OUT: 2425 mL    Total NET: 1304.7 mL        ============================ LABS =========================                        9.0    7.41  )-----------( 121      ( 2021 00:39 )             27.9     04-03    146<H>  |  112<H>  |  11  ----------------------------<  147<H>  4.6   |  21<L>  |  0.63    Ca    8.7      2021 00:39  Phos  3.3     04-03  Mg     2.7     04-03    TPro  5.2<L>  /  Alb  3.9  /  TBili  1.5<H>  /  DBili  x   /  AST  39  /  ALT  9<L>  /  AlkPhos  33<L>  04-03    LIVER FUNCTIONS - ( 2021 00:39 )  Alb: 3.9 g/dL / Pro: 5.2 g/dL / ALK PHOS: 33 U/L / ALT: 9 U/L / AST: 39 U/L / GGT: x           PT/INR - ( 2021 00:39 )   PT: 15.3 sec;   INR: 1.29 ratio         PTT - ( 2021 00:39 )  PTT:63.3 sec  ABG - ( 2021 05:54 )  pH, Arterial: 7.42  pH, Blood: x     /  pCO2: 36    /  pO2: 145   / HCO3: 22    / Base Excess: -1.4  /  SaO2: 100                 ======================Micro/Rad/Cardio=================  CXR: Reviewed  Echo:Reviewed  ======================================================  PAST MEDICAL & SURGICAL HISTORY:  T2DM (type 2 diabetes mellitus)    Benign essential HTN    HTN (hypertension)    T2DM (type 2 diabetes mellitus)    H/O  section      ====================ASSESSMENT ==============  2021 CABG x3 C3L, LEANA clip   Acute on chronic systolic CHF  Anemia blood loss  Hemodynamic instability   Lactic acidosis   CAD/ASHD  Hypertension   Diabetes mellitus type 2 / DKA   Hypovolemic shock  Post op respiratory insufficiency     Plan:  ====================== NEUROLOGY=====================  Sedated with IV Precedex post extubation  Tylenol and Oxycodone for analgesia PRN   Ambulate as tolerated    acetaminophen   Tablet .. 650 milliGRAM(s) Oral every 6 hours PRN Mild Pain (1 - 3)  dexMEDEtomidine Infusion 0.7 MICROgram(s)/kG/Hr (9.75 mL/Hr) IV Continuous <Continuous>  oxyCODONE    IR 5 milliGRAM(s) Oral every 4 hours PRN Moderate Pain (4 - 6)    ==================== RESPIRATORY======================  Extubated overnight, supplemental O2 via NC   Encourage incentive spirometry, continue pulse ox monitoring, follow ABGs     ====================CARDIOVASCULAR==================  CAD s/p C3L, LEANA clip   Continue invasive hemodynamic monitoring   Dobutamine now off, Primacor at 0.2, will maintain throughout the weekend  Digoxin load for afib prophylaxis as per Dr. Baig  Lactate peaked to 3.4 overnight, now 1.8   Temporary V pacing wires in place   ASA for graft patency, plan to start Plavix this AM     aspirin enteric coated 325 milliGRAM(s) Oral daily  DOBUTamine Infusion 2 MICROgram(s)/kG/Min (3.34 mL/Hr) IV Continuous <Continuous>  milrinone Infusion 0.2 MICROgram(s)/kG/Min (3.34 mL/Hr) IV Continuous <Continuous>    ===================HEMATOLOGIC/ONC ===================  Acute blood loss anemia, monitor H&H/Plts      ===================== RENAL =========================  Continue monitoring urine output, I&OS, BUN/Cr     ==================== GASTROINTESTINAL===================  Tolerating consistent carb diet   Bowel regimen with Miralax     GI prophylaxis, pantoprazole  Injectable 40 milliGRAM(s) IV Push daily  polyethylene glycol 3350 17 Gram(s) Oral daily  potassium chloride  10 mEq/50 mL IVPB 10 milliEquivalent(s) IV Intermittent every 1 hour  sodium chloride 0.9%. 1000 milliLiter(s) (10 mL/Hr) IV Continuous <Continuous>    =======================    ENDOCRINE  =====================  Hx of DM2, recent DKA, continue glycemic control with IV insulin drip   Will give 8U NPH to bridge off insulin drip, then start Lantus and premeal tonight    insulin regular Infusion 3 Unit(s)/Hr (3 mL/Hr) IV Continuous <Continuous>    ========================INFECTIOUS DISEASE================  Perioperative coverage with cefuroxime     cefuroxime  IVPB 1500 milliGRAM(s) IV Intermittent every 8 hours        I have spent 35 minutes providing acute care for this critically ill patient     Patient requires continuous monitoring with bedside rhythm monitoring, pulse ox monitoring, and intermittent blood gas analysis. Care plan discussed with ICU care team. Patient remained critical and at risk for life threatening decompensation.     By signing my name below, I, Kimberlyn Bhat, attest that this documentation has been prepared under the direction and in the presence of Santhosh Hernandez MD   Electronically signed: Karel Cyr, 21 @ 06:09    I, Santhosh Hernandez, personally performed the services described in this documentation. all medical record entries made by the karel were at my direction and in my presence. I have reviewed the chart and agree that the record reflects my personal performance and is accurate and complete  Electronically signed: Santhosh Hernandez MD

## 2021-04-03 NOTE — PHYSICAL THERAPY INITIAL EVALUATION ADULT - GENERAL OBSERVATIONS, REHAB EVAL
Patient received sitting in chair in CTU, NAD, VSS, +Right IJ Cordis, +CT x 3 to LWS, +donald, +ICU monitors, +1L O2 via NC

## 2021-04-03 NOTE — PHYSICAL THERAPY INITIAL EVALUATION ADULT - PERTINENT HX OF CURRENT PROBLEM, REHAB EVAL
51yo female p/w worsening shortness of breath and chest pain for 1 wk admitted to OSH 3/23 for DKA, transferred to [facility] 3/29.  3/30: Carotid doppler (-)

## 2021-04-04 DIAGNOSIS — Z95.1 PRESENCE OF AORTOCORONARY BYPASS GRAFT: ICD-10-CM

## 2021-04-04 LAB
ALBUMIN SERPL ELPH-MCNC: 3.2 G/DL — LOW (ref 3.3–5)
ALP SERPL-CCNC: 54 U/L — SIGNIFICANT CHANGE UP (ref 40–120)
ALT FLD-CCNC: 31 U/L — SIGNIFICANT CHANGE UP (ref 10–45)
ANION GAP SERPL CALC-SCNC: 10 MMOL/L — SIGNIFICANT CHANGE UP (ref 5–17)
AST SERPL-CCNC: 91 U/L — HIGH (ref 10–40)
BILIRUB SERPL-MCNC: 0.5 MG/DL — SIGNIFICANT CHANGE UP (ref 0.2–1.2)
BUN SERPL-MCNC: 11 MG/DL — SIGNIFICANT CHANGE UP (ref 7–23)
CALCIUM SERPL-MCNC: 8.7 MG/DL — SIGNIFICANT CHANGE UP (ref 8.4–10.5)
CHLORIDE SERPL-SCNC: 107 MMOL/L — SIGNIFICANT CHANGE UP (ref 96–108)
CO2 SERPL-SCNC: 22 MMOL/L — SIGNIFICANT CHANGE UP (ref 22–31)
CREAT SERPL-MCNC: 0.64 MG/DL — SIGNIFICANT CHANGE UP (ref 0.5–1.3)
GAS PNL BLDA: SIGNIFICANT CHANGE UP
GLUCOSE BLDC GLUCOMTR-MCNC: 108 MG/DL — HIGH (ref 70–99)
GLUCOSE BLDC GLUCOMTR-MCNC: 155 MG/DL — HIGH (ref 70–99)
GLUCOSE BLDC GLUCOMTR-MCNC: 183 MG/DL — HIGH (ref 70–99)
GLUCOSE BLDC GLUCOMTR-MCNC: 210 MG/DL — HIGH (ref 70–99)
GLUCOSE SERPL-MCNC: 85 MG/DL — SIGNIFICANT CHANGE UP (ref 70–99)
HCT VFR BLD CALC: 29.5 % — LOW (ref 34.5–45)
HGB BLD-MCNC: 9.7 G/DL — LOW (ref 11.5–15.5)
MAGNESIUM SERPL-MCNC: 2.2 MG/DL — SIGNIFICANT CHANGE UP (ref 1.6–2.6)
MCHC RBC-ENTMCNC: 26.6 PG — LOW (ref 27–34)
MCHC RBC-ENTMCNC: 32.9 GM/DL — SIGNIFICANT CHANGE UP (ref 32–36)
MCV RBC AUTO: 80.8 FL — SIGNIFICANT CHANGE UP (ref 80–100)
NRBC # BLD: 0 /100 WBCS — SIGNIFICANT CHANGE UP (ref 0–0)
PHOSPHATE SERPL-MCNC: 3 MG/DL — SIGNIFICANT CHANGE UP (ref 2.5–4.5)
PLATELET # BLD AUTO: 154 K/UL — SIGNIFICANT CHANGE UP (ref 150–400)
POTASSIUM SERPL-MCNC: 4.4 MMOL/L — SIGNIFICANT CHANGE UP (ref 3.5–5.3)
POTASSIUM SERPL-SCNC: 4.4 MMOL/L — SIGNIFICANT CHANGE UP (ref 3.5–5.3)
PROT SERPL-MCNC: 5.2 G/DL — LOW (ref 6–8.3)
RBC # BLD: 3.65 M/UL — LOW (ref 3.8–5.2)
RBC # FLD: 14 % — SIGNIFICANT CHANGE UP (ref 10.3–14.5)
SODIUM SERPL-SCNC: 139 MMOL/L — SIGNIFICANT CHANGE UP (ref 135–145)
WBC # BLD: 14.84 K/UL — HIGH (ref 3.8–10.5)
WBC # FLD AUTO: 14.84 K/UL — HIGH (ref 3.8–10.5)

## 2021-04-04 PROCEDURE — 32556 INSERT CATH PLEURA W/O IMAGE: CPT

## 2021-04-04 PROCEDURE — 99233 SBSQ HOSP IP/OBS HIGH 50: CPT

## 2021-04-04 PROCEDURE — 71045 X-RAY EXAM CHEST 1 VIEW: CPT | Mod: 26

## 2021-04-04 RX ORDER — FUROSEMIDE 40 MG
20 TABLET ORAL ONCE
Refills: 0 | Status: COMPLETED | OUTPATIENT
Start: 2021-04-04 | End: 2021-04-04

## 2021-04-04 RX ORDER — LOSARTAN POTASSIUM 100 MG/1
25 TABLET, FILM COATED ORAL AT BEDTIME
Refills: 0 | Status: DISCONTINUED | OUTPATIENT
Start: 2021-04-04 | End: 2021-04-09

## 2021-04-04 RX ORDER — LOSARTAN POTASSIUM 100 MG/1
25 TABLET, FILM COATED ORAL AT BEDTIME
Refills: 0 | Status: DISCONTINUED | OUTPATIENT
Start: 2021-04-04 | End: 2021-04-04

## 2021-04-04 RX ORDER — SODIUM CHLORIDE 9 MG/ML
3 INJECTION INTRAMUSCULAR; INTRAVENOUS; SUBCUTANEOUS EVERY 8 HOURS
Refills: 0 | Status: DISCONTINUED | OUTPATIENT
Start: 2021-04-04 | End: 2021-04-09

## 2021-04-04 RX ADMIN — Medication 4: at 12:09

## 2021-04-04 RX ADMIN — OXYCODONE HYDROCHLORIDE 5 MILLIGRAM(S): 5 TABLET ORAL at 15:15

## 2021-04-04 RX ADMIN — OXYCODONE HYDROCHLORIDE 5 MILLIGRAM(S): 5 TABLET ORAL at 04:42

## 2021-04-04 RX ADMIN — PANTOPRAZOLE SODIUM 40 MILLIGRAM(S): 20 TABLET, DELAYED RELEASE ORAL at 05:16

## 2021-04-04 RX ADMIN — OXYCODONE HYDROCHLORIDE 5 MILLIGRAM(S): 5 TABLET ORAL at 19:19

## 2021-04-04 RX ADMIN — OXYCODONE HYDROCHLORIDE 5 MILLIGRAM(S): 5 TABLET ORAL at 04:12

## 2021-04-04 RX ADMIN — Medication 20 MILLIGRAM(S): at 05:16

## 2021-04-04 RX ADMIN — OXYCODONE HYDROCHLORIDE 5 MILLIGRAM(S): 5 TABLET ORAL at 14:22

## 2021-04-04 RX ADMIN — CLOPIDOGREL BISULFATE 75 MILLIGRAM(S): 75 TABLET, FILM COATED ORAL at 11:59

## 2021-04-04 RX ADMIN — Medication 6 UNIT(S): at 17:21

## 2021-04-04 RX ADMIN — Medication 650 MILLIGRAM(S): at 16:30

## 2021-04-04 RX ADMIN — Medication 325 MILLIGRAM(S): at 11:58

## 2021-04-04 RX ADMIN — OXYCODONE HYDROCHLORIDE 5 MILLIGRAM(S): 5 TABLET ORAL at 18:00

## 2021-04-04 RX ADMIN — Medication 6 UNIT(S): at 12:08

## 2021-04-04 RX ADMIN — CHLORHEXIDINE GLUCONATE 1 APPLICATION(S): 213 SOLUTION TOPICAL at 05:13

## 2021-04-04 RX ADMIN — Medication 25 MILLIGRAM(S): at 05:16

## 2021-04-04 RX ADMIN — Medication 6 UNIT(S): at 08:05

## 2021-04-04 RX ADMIN — Medication 2: at 17:22

## 2021-04-04 RX ADMIN — ATORVASTATIN CALCIUM 80 MILLIGRAM(S): 80 TABLET, FILM COATED ORAL at 21:21

## 2021-04-04 RX ADMIN — SODIUM CHLORIDE 3 MILLILITER(S): 9 INJECTION INTRAMUSCULAR; INTRAVENOUS; SUBCUTANEOUS at 21:08

## 2021-04-04 RX ADMIN — Medication 25 MILLIGRAM(S): at 17:22

## 2021-04-04 RX ADMIN — ENOXAPARIN SODIUM 40 MILLIGRAM(S): 100 INJECTION SUBCUTANEOUS at 11:58

## 2021-04-04 RX ADMIN — LOSARTAN POTASSIUM 25 MILLIGRAM(S): 100 TABLET, FILM COATED ORAL at 21:25

## 2021-04-04 RX ADMIN — INSULIN GLARGINE 10 UNIT(S): 100 INJECTION, SOLUTION SUBCUTANEOUS at 21:22

## 2021-04-04 NOTE — PROGRESS NOTE ADULT - PROBLEM SELECTOR PLAN 1
s/p CABG/LEANA  ASA Plavix   Lop 25 bid  ator 89 Losartan 25  ISS lantus premeal   Maintain left CT lws  Mainatin rt pigtail LWS   B/lle ace wrap  + pw   incentive spirometry ambulate tid  disposition to home with PT

## 2021-04-04 NOTE — PROGRESS NOTE ADULT - SUBJECTIVE AND OBJECTIVE BOX
JAMSHID IRVIN  MRN-[id number]  Patient is a 50y old  Female who presents with a chief complaint of TVD (2021 06:09)    HPI:  51y/o Smooth Female with pmhx of T2DM (diagnosed ) and Hypertension (diagnosed ) p/w worsening shortness of breath and chest pain for 1 wk on 3/23. Due to insurance issues, was not able to take home metformin. Last 1 week she was unable to perform her daily routines due to sob with minimal exertion with associated cough and orthopnea requiring 2-3 pillow to sleep. At [facility] ED, pt was found to be in DKA with glucose level in 600s, proBNP 2107, trop 111 with ST depression in V5/V6. Endo was consulted and was placed on insulin gtt and gradually transitioned to SQ insulin. Pt was given Lasix IV push and admitted to CCU on bipap and nitro gtt. TTE done on 3/24 showed EF 35-40%, moderate to severe LV systolic dysfunction, apical thrombus seen, and trace pericardial effusion, and b/l pleural effusions. Pt was warranted for ischemic evaluation and underwent cath on 3/25. LHC/RHC 3/25 showed PCW 30, CO 3.94, CI 2.42, pLAD 80% stenosis, Mid LAD 60% stenosis, Mid circumflex 70%, RCA 75% stenosis, RPDA 70% stenosis, RPLS 60% stenosis at ostium.    (29 Mar 2021 23:16)      Surgery/Hospital course:  3/23 DKA at [facility]   4/2 CABG x3, LEANA clip     REVIEW OF SYSTEMS:  Gen: No fever  EYES/ENT: No visual changes;  No vertigo or throat pain   NECK: No pain   RES:  No shortness of breath or Cough  CARD: No chest pain   Chest: + incisional chest pain  GI: No abdominal pain  : No dysuria  NEURO: No weakness  SKIN: No itching, rashes     Vital Signs Last 24 Hrs  T(C): 37.6 (2021 04:00), Max: 37.7 (2021 20:00)  T(F): 99.7 (2021 04:00), Max: 99.9 (2021 20:00)  HR: 104 (2021 06:00) (79 - 110)  BP: 129/74 (2021 17:00) (95/63 - 133/76)  BP(mean): 96 (2021 17:00) (75 - 96)  RR: 27 (2021 06:00) (11 - 34)  SpO2: 96% (2021 06:00) (94% - 100%)    ============================I/O===========================   I&O's Detail    2021 07:01  -  2021 07:00  --------------------------------------------------------  IN:    Albumin 5%  - 250 mL: 1000 mL    Dexmedetomidine: 27.8 mL    DOBUTamine: 55.3 mL    Insulin: 45 mL    IV PiggyBack: 500 mL    Lactated Ringers Bolus: 1750 mL    Milrinone: 49.5 mL    PRBCs (Packed Red Blood Cells): 300 mL    sodium chloride 0.9%: 160 mL  Total IN: 3887.6 mL    OUT:    Chest Tube (mL): 240 mL    Chest Tube (mL): 120 mL    Chest Tube (mL): 140 mL    Indwelling Catheter - Urethral (mL): 2015 mL  Total OUT: 2515 mL    Total NET: 1372.6 mL      2021 07:01  -  2021 06:16  --------------------------------------------------------  IN:    Insulin: 9 mL    IV PiggyBack: 50 mL    Milrinone: 29.7 mL    sodium chloride 0.9%: 230 mL  Total IN: 318.7 mL    OUT:    Chest Tube (mL): 70 mL    Chest Tube (mL): 70 mL    Chest Tube (mL): 210 mL    Indwelling Catheter - Urethral (mL): 1110 mL  Total OUT: 1460 mL    Total NET: -1141.3 mL        ============================ LABS =========================                        9.7    14.84 )-----------( 154      ( 2021 00:40 )             29.5     04-04    139  |  107  |  11  ----------------------------<  85  4.4   |  22  |  0.64    Ca    8.7      2021 00:40  Phos  3.0     04-04  Mg     2.2     04-04    TPro  5.2<L>  /  Alb  3.2<L>  /  TBili  0.5  /  DBili  x   /  AST  91<H>  /  ALT  31  /  AlkPhos  54  04-04    LIVER FUNCTIONS - ( 2021 00:40 )  Alb: 3.2 g/dL / Pro: 5.2 g/dL / ALK PHOS: 54 U/L / ALT: 31 U/L / AST: 91 U/L / GGT: x           PT/INR - ( 2021 00:39 )   PT: 15.3 sec;   INR: 1.29 ratio         PTT - ( 2021 00:39 )  PTT:63.3 sec  ABG - ( 2021 00:19 )  pH, Arterial: 7.43  pH, Blood: x     /  pCO2: 37    /  pO2: 78    / HCO3: 24    / Base Excess: .2    /  SaO2: 96        ======================Microbiology/Radiology=================  Culture: Reviewed   CXR: Reviewed  ======================================================  PAST MEDICAL & SURGICAL HISTORY:  T2DM (type 2 diabetes mellitus)    Benign essential HTN    HTN (hypertension)    T2DM (type 2 diabetes mellitus)    H/O  section      ====================ASSESSMENT ==============  2021 CABG x3 C3L, LEANA clip   Acute on chronic systolic CHF  Anemia blood loss  Lactic acidosis, resolved   CAD/ASHD  Hypertension   Diabetes mellitus type 2 / DKA   Hypovolemic shock, resolved     Plan:  ====================== NEUROLOGY=====================  Nonfocal, continue to monitor neuro status per ICU protocol.   Pain management with Tylenol and Oxycodone   OOBTC today, continue ambulation as tolerated     acetaminophen   Tablet .. 650 milliGRAM(s) Oral every 6 hours PRN Mild Pain (1 - 3)  oxyCODONE    IR 5 milliGRAM(s) Oral every 4 hours PRN Moderate Pain (4 - 6)  ==================== RESPIRATORY======================  Comfortable on room air, SpO2 >95%  Continue to monitor SpO2 via pulse oximetry  Encourage bedside spirometry     ====================CARDIOVASCULAR==================  CAD s/p C3L, LEANA clip on   Continue rate and BP control with Lopressor  Invasive hemodynamic monitoring, assess perfusion indices.   Temporary V pacing wires in place   C/w DAPT: ASA and Plavix along with high dose Lipitor for graft patency    metoprolol tartrate 25 milliGRAM(s) Oral two times a day  aspirin enteric coated 325 milliGRAM(s) Oral daily  clopidogrel Tablet 75 milliGRAM(s) Oral daily  atorvastatin 80 milliGRAM(s) Oral at bedtime  ===================HEMATOLOGIC/ONC ===================  Acute blood loss Anemia. Monitor H&H/Plts   Continue SQ Lovenox for venous thromboembolism prophylaxis.     enoxaparin Injectable 40 milliGRAM(s) SubCutaneous daily  ===================== RENAL =========================  Continue to monitor I/Os, BUN/Creatinine, and urine output.   Goal net negative fluid balance. Replete lytes PRN. Keep K> 4 and Mg >2.   ==================== GASTROINTESTINAL===================  Tolerating PO consistent carb diet.   Continue Protonix for stress ulcer prophylaxis.   Bowel regimen with Miralax.     pantoprazole    Tablet 40 milliGRAM(s) Oral before breakfast  polyethylene glycol 3350 17 Gram(s) Oral daily  sodium chloride 0.9%. 1000 milliLiter(s) (10 mL/Hr) IV Continuous <Continuous>  =======================    ENDOCRINE  =====================  Hx of DMT2, glycemic control with Admelog sliding scale and Lantus.   Monitor blood glucose levels.     insulin glargine Injectable (LANTUS) 10 Unit(s) SubCutaneous at bedtime  insulin lispro (ADMELOG) corrective regimen sliding scale   SubCutaneous three times a day before meals  insulin lispro (ADMELOG) corrective regimen sliding scale   SubCutaneous at bedtime  insulin lispro Injectable (ADMELOG) 6 Unit(s) SubCutaneous three times a day before meals  ========================INFECTIOUS DISEASE================  Afebrile, leukocytosis with WBC rising 7.41 -> 14.84.   Monitor temperature and trend WBC. Monitor off abx.     TIME: 30 minutes    Patient requires continuous monitoring with bedside rhythm monitoring, pulse ox monitoring, and intermittent blood gas analysis. Care plan discussed with ICU care team. Patient remained critical and at risk for life threatening decompensation.    By signing my name below, Aurelia BUTLER, attest that this documentation has been prepared under the direction and in the presence of Nilo Kelley MD   Electronically signed: Charles Jones Matthew Pierce, personally performed the services described in this documentation. all medical record entries made by the scribe were at my direction and in my presence. I have reviewed the chart and agree that the record reflects my personal performance and is accurate and complete  Electronically signed: Nilo Kelley MD 21 @ 06:16       JAMSHID IRVIN  MRN-[id number]  Patient is a 50y old  Female who presents with a chief complaint of TVD (2021 06:09)    HPI:  49y/o Smooth Female with pmhx of T2DM (diagnosed ) and Hypertension (diagnosed ) p/w worsening shortness of breath and chest pain for 1 wk on 3/23. Due to insurance issues, was not able to take home metformin. Last 1 week she was unable to perform her daily routines due to sob with minimal exertion with associated cough and orthopnea requiring 2-3 pillow to sleep. At [facility] ED, pt was found to be in DKA with glucose level in 600s, proBNP 2107, trop 111 with ST depression in V5/V6. Endo was consulted and was placed on insulin gtt and gradually transitioned to SQ insulin. Pt was given Lasix IV push and admitted to CCU on bipap and nitro gtt. TTE done on 3/24 showed EF 35-40%, moderate to severe LV systolic dysfunction, apical thrombus seen, and trace pericardial effusion, and b/l pleural effusions. Pt was warranted for ischemic evaluation and underwent cath on 3/25. LHC/RHC 3/25 showed PCW 30, CO 3.94, CI 2.42, pLAD 80% stenosis, Mid LAD 60% stenosis, Mid circumflex 70%, RCA 75% stenosis, RPDA 70% stenosis, RPLS 60% stenosis at ostium.    (29 Mar 2021 23:16)      Surgery/Hospital course:  3/23 DKA at [facility]   4/2 CABG x3, LEANA clip     REVIEW OF SYSTEMS:  Gen: No fever  EYES/ENT: No visual changes;  No vertigo or throat pain   NECK: No pain   RES:  No shortness of breath or Cough  CARD: No chest pain   Chest: + incisional chest pain  GI: No abdominal pain  : No dysuria  NEURO: No weakness  SKIN: No itching, rashes     Vital Signs Last 24 Hrs  T(C): 37.6 (2021 04:00), Max: 37.7 (2021 20:00)  T(F): 99.7 (2021 04:00), Max: 99.9 (2021 20:00)  HR: 104 (2021 06:00) (79 - 110)  BP: 129/74 (2021 17:00) (95/63 - 133/76)  BP(mean): 96 (2021 17:00) (75 - 96)  RR: 27 (2021 06:00) (11 - 34)  SpO2: 96% (2021 06:00) (94% - 100%)    ============================I/O===========================   I&O's Detail    2021 07:01  -  2021 07:00  --------------------------------------------------------  IN:    Albumin 5%  - 250 mL: 1000 mL    Dexmedetomidine: 27.8 mL    DOBUTamine: 55.3 mL    Insulin: 45 mL    IV PiggyBack: 500 mL    Lactated Ringers Bolus: 1750 mL    Milrinone: 49.5 mL    PRBCs (Packed Red Blood Cells): 300 mL    sodium chloride 0.9%: 160 mL  Total IN: 3887.6 mL    OUT:    Chest Tube (mL): 240 mL    Chest Tube (mL): 120 mL    Chest Tube (mL): 140 mL    Indwelling Catheter - Urethral (mL): 2015 mL  Total OUT: 2515 mL    Total NET: 1372.6 mL      2021 07:01  -  2021 06:16  --------------------------------------------------------  IN:    Insulin: 9 mL    IV PiggyBack: 50 mL    Milrinone: 29.7 mL    sodium chloride 0.9%: 230 mL  Total IN: 318.7 mL    OUT:    Chest Tube (mL): 70 mL    Chest Tube (mL): 70 mL    Chest Tube (mL): 210 mL    Indwelling Catheter - Urethral (mL): 1110 mL  Total OUT: 1460 mL    Total NET: -1141.3 mL        ============================ LABS =========================                        9.7    14.84 )-----------( 154      ( 2021 00:40 )             29.5     04-04    139  |  107  |  11  ----------------------------<  85  4.4   |  22  |  0.64    Ca    8.7      2021 00:40  Phos  3.0     04-04  Mg     2.2     04-04    TPro  5.2<L>  /  Alb  3.2<L>  /  TBili  0.5  /  DBili  x   /  AST  91<H>  /  ALT  31  /  AlkPhos  54  04-04    LIVER FUNCTIONS - ( 2021 00:40 )  Alb: 3.2 g/dL / Pro: 5.2 g/dL / ALK PHOS: 54 U/L / ALT: 31 U/L / AST: 91 U/L / GGT: x           PT/INR - ( 2021 00:39 )   PT: 15.3 sec;   INR: 1.29 ratio         PTT - ( 2021 00:39 )  PTT:63.3 sec  ABG - ( 2021 00:19 )  pH, Arterial: 7.43  pH, Blood: x     /  pCO2: 37    /  pO2: 78    / HCO3: 24    / Base Excess: .2    /  SaO2: 96        ======================Microbiology/Radiology=================  Culture: Reviewed   CXR: Reviewed  ======================================================  PAST MEDICAL & SURGICAL HISTORY:  T2DM (type 2 diabetes mellitus)    Benign essential HTN    HTN (hypertension)    T2DM (type 2 diabetes mellitus)    H/O  section      ====================ASSESSMENT ==============  2021 CABG x3 C3L, LEANA clip   Acute on chronic systolic CHF  Anemia blood loss  Lactic acidosis, resolved   CAD/ASHD  Hypertension   Diabetes mellitus type 2 / DKA   Hypovolemic shock, resolved  Fevers   Leukocytosis     Plan:  ====================== NEUROLOGY=====================  Nonfocal, continue to monitor neuro status per ICU protocol.   Pain management with Tylenol and Oxycodone   OOBTC today, continue ambulation as tolerated     acetaminophen   Tablet .. 650 milliGRAM(s) Oral every 6 hours PRN Mild Pain (1 - 3)  oxyCODONE    IR 5 milliGRAM(s) Oral every 4 hours PRN Moderate Pain (4 - 6)  ==================== RESPIRATORY======================  Comfortable on room air, SpO2 >95%  Continue to monitor SpO2 via pulse oximetry  Encourage bedside spirometry     ====================CARDIOVASCULAR==================  CAD s/p C3L, LEANA clip on   Currently off inotropes and pressors  Continue rate and BP control with Lopressor  Invasive hemodynamic monitoring, assess perfusion indices.   Temporary V pacing wires in place   C/w DAPT: ASA and Plavix along with high dose Lipitor for graft patency    metoprolol tartrate 25 milliGRAM(s) Oral two times a day  aspirin enteric coated 325 milliGRAM(s) Oral daily  clopidogrel Tablet 75 milliGRAM(s) Oral daily  atorvastatin 80 milliGRAM(s) Oral at bedtime  ===================HEMATOLOGIC/ONC ===================  Acute blood loss Anemia. Monitor H&H/Plts   Continue SQ Lovenox for venous thromboembolism prophylaxis.     enoxaparin Injectable 40 milliGRAM(s) SubCutaneous daily  ===================== RENAL =========================  Continue to monitor I/Os, BUN/Creatinine, and urine output.   Received Lasix 20mg IVP x1 overnight to maintain net negative fluid balance.   Replete lytes PRN. Keep K> 4 and Mg >2.   ==================== GASTROINTESTINAL===================  Tolerating PO consistent carb diet.   Continue Protonix for stress ulcer prophylaxis.   Bowel regimen with Miralax.     pantoprazole    Tablet 40 milliGRAM(s) Oral before breakfast  polyethylene glycol 3350 17 Gram(s) Oral daily  sodium chloride 0.9%. 1000 milliLiter(s) (10 mL/Hr) IV Continuous <Continuous>  =======================    ENDOCRINE  =====================  Hx of DMT2, glycemic control with Admelog sliding scale and Lantus.   Monitor blood glucose levels.     insulin glargine Injectable (LANTUS) 10 Unit(s) SubCutaneous at bedtime  insulin lispro (ADMELOG) corrective regimen sliding scale   SubCutaneous three times a day before meals  insulin lispro (ADMELOG) corrective regimen sliding scale   SubCutaneous at bedtime  insulin lispro Injectable (ADMELOG) 6 Unit(s) SubCutaneous three times a day before meals  ========================INFECTIOUS DISEASE================  Fevers w/ TMAX 99.9F, leukocytosis with WBC rising 7.41 -> 14.84.   Monitor temperature and trend WBC. Monitor off abx.     TIME: 30 minutes    Will de-line pt (A line, central line, Molina). Pt is a candidate for transfer to the floors.     Patient requires continuous monitoring with bedside rhythm monitoring, pulse ox monitoring, and intermittent blood gas analysis. Care plan discussed with ICU care team. Patient remained critical and at risk for life threatening decompensation.    By signing my name below, I, Aurelia Reyes, attest that this documentation has been prepared under the direction and in the presence of Nilo Kelley MD   Electronically signed: Rosio Jonesibe    I, Nilo Kelley, personally performed the services described in this documentation. all medical record entries made by the scribe were at my direction and in my presence. I have reviewed the chart and agree that the record reflects my personal performance and is accurate and complete  Electronically signed: Nilo Kelley MD 21 @ 06:16       JAMSHID IRVIN  MRN-[id number]  Patient is a 50y old  Female who presents with a chief complaint of TVD (2021 06:09)    HPI:  49y/o Smooth Female with pmhx of T2DM (diagnosed ) and Hypertension (diagnosed ) p/w worsening shortness of breath and chest pain for 1 wk on 3/23. Due to insurance issues, was not able to take home metformin. Last 1 week she was unable to perform her daily routines due to sob with minimal exertion with associated cough and orthopnea requiring 2-3 pillow to sleep. At [facility] ED, pt was found to be in DKA with glucose level in 600s, proBNP 2107, trop 111 with ST depression in V5/V6. Endo was consulted and was placed on insulin gtt and gradually transitioned to SQ insulin. Pt was given Lasix IV push and admitted to CCU on bipap and nitro gtt. TTE done on 3/24 showed EF 35-40%, moderate to severe LV systolic dysfunction, apical thrombus seen, and trace pericardial effusion, and b/l pleural effusions. Pt was warranted for ischemic evaluation and underwent cath on 3/25. LHC/RHC 3/25 showed PCW 30, CO 3.94, CI 2.42, pLAD 80% stenosis, Mid LAD 60% stenosis, Mid circumflex 70%, RCA 75% stenosis, RPDA 70% stenosis, RPLS 60% stenosis at ostium.    (29 Mar 2021 23:16)      Surgery/Hospital course:  3/23 DKA at [facility]   4/2 CABG x3, LEANA clip     REVIEW OF SYSTEMS:  Gen: No fever  EYES/ENT: No visual changes;  No vertigo or throat pain   NECK: No pain   RES:  No shortness of breath or Cough  CARD: No chest pain   Chest: + incisional chest pain  GI: No abdominal pain  : No dysuria  NEURO: No weakness  SKIN: No itching, rashes     Vital Signs Last 24 Hrs  T(C): 37.6 (2021 04:00), Max: 37.7 (2021 20:00)  T(F): 99.7 (2021 04:00), Max: 99.9 (2021 20:00)  HR: 104 (2021 06:00) (79 - 110)  BP: 129/74 (2021 17:00) (95/63 - 133/76)  BP(mean): 96 (2021 17:00) (75 - 96)  RR: 27 (2021 06:00) (11 - 34)  SpO2: 96% (2021 06:00) (94% - 100%)    ============================I/O===========================   I&O's Detail    2021 07:01  -  2021 07:00  --------------------------------------------------------  IN:    Albumin 5%  - 250 mL: 1000 mL    Dexmedetomidine: 27.8 mL    DOBUTamine: 55.3 mL    Insulin: 45 mL    IV PiggyBack: 500 mL    Lactated Ringers Bolus: 1750 mL    Milrinone: 49.5 mL    PRBCs (Packed Red Blood Cells): 300 mL    sodium chloride 0.9%: 160 mL  Total IN: 3887.6 mL    OUT:    Chest Tube (mL): 240 mL    Chest Tube (mL): 120 mL    Chest Tube (mL): 140 mL    Indwelling Catheter - Urethral (mL): 2015 mL  Total OUT: 2515 mL    Total NET: 1372.6 mL      2021 07:01  -  2021 06:16  --------------------------------------------------------  IN:    Insulin: 9 mL    IV PiggyBack: 50 mL    Milrinone: 29.7 mL    sodium chloride 0.9%: 230 mL  Total IN: 318.7 mL    OUT:    Chest Tube (mL): 70 mL    Chest Tube (mL): 70 mL    Chest Tube (mL): 210 mL    Indwelling Catheter - Urethral (mL): 1110 mL  Total OUT: 1460 mL    Total NET: -1141.3 mL        ============================ LABS =========================                        9.7    14.84 )-----------( 154      ( 2021 00:40 )             29.5     04-04    139  |  107  |  11  ----------------------------<  85  4.4   |  22  |  0.64    Ca    8.7      2021 00:40  Phos  3.0     04-04  Mg     2.2     04-04    TPro  5.2<L>  /  Alb  3.2<L>  /  TBili  0.5  /  DBili  x   /  AST  91<H>  /  ALT  31  /  AlkPhos  54  04-04    LIVER FUNCTIONS - ( 2021 00:40 )  Alb: 3.2 g/dL / Pro: 5.2 g/dL / ALK PHOS: 54 U/L / ALT: 31 U/L / AST: 91 U/L / GGT: x           PT/INR - ( 2021 00:39 )   PT: 15.3 sec;   INR: 1.29 ratio         PTT - ( 2021 00:39 )  PTT:63.3 sec  ABG - ( 2021 00:19 )  pH, Arterial: 7.43  pH, Blood: x     /  pCO2: 37    /  pO2: 78    / HCO3: 24    / Base Excess: .2    /  SaO2: 96        ======================Microbiology/Radiology=================  Culture: Reviewed   CXR: Reviewed  ======================================================  PAST MEDICAL & SURGICAL HISTORY:  T2DM (type 2 diabetes mellitus)    Benign essential HTN    HTN (hypertension)    T2DM (type 2 diabetes mellitus)    H/O  section      ====================ASSESSMENT ==============  2021 CABG x3 C3L, LEANA clip   Acute on chronic systolic CHF  Anemia blood loss  Lactic acidosis, resolved   CAD/ASHD  Hypertension   Diabetes mellitus type 2 / DKA   Hypovolemic shock, resolved  Fevers   Leukocytosis     Plan:  ====================== NEUROLOGY=====================  Nonfocal, continue to monitor neuro status per ICU protocol.   Pain management with Tylenol and Oxycodone   OOBTC today, continue ambulation as tolerated     acetaminophen   Tablet .. 650 milliGRAM(s) Oral every 6 hours PRN Mild Pain (1 - 3)  oxyCODONE    IR 5 milliGRAM(s) Oral every 4 hours PRN Moderate Pain (4 - 6)  ==================== RESPIRATORY======================  Comfortable on room air, SpO2 >95%  Continue to monitor SpO2 via pulse oximetry  Encourage bedside spirometry     ====================CARDIOVASCULAR==================  CAD s/p C3L, LEANA clip on   Currently off inotropes and pressors  Continue rate and BP control with Lopressor  Invasive hemodynamic monitoring, assess perfusion indices.   Temporary V pacing wires in place   C/w DAPT: ASA and Plavix along with high dose Lipitor for graft patency    metoprolol tartrate 25 milliGRAM(s) Oral two times a day  aspirin enteric coated 325 milliGRAM(s) Oral daily  clopidogrel Tablet 75 milliGRAM(s) Oral daily  atorvastatin 80 milliGRAM(s) Oral at bedtime  ===================HEMATOLOGIC/ONC ===================  Acute blood loss Anemia. Monitor H&H/Plts   Continue SQ Lovenox for venous thromboembolism prophylaxis.     enoxaparin Injectable 40 milliGRAM(s) SubCutaneous daily  ===================== RENAL =========================  Continue to monitor I/Os, BUN/Creatinine, and urine output.   Received Lasix 20mg IVP x1 overnight to maintain net negative fluid balance.   Replete lytes PRN. Keep K> 4 and Mg >2.   ==================== GASTROINTESTINAL===================  Tolerating PO consistent carb diet.   Continue Protonix for stress ulcer prophylaxis.   Bowel regimen with Miralax.     pantoprazole    Tablet 40 milliGRAM(s) Oral before breakfast  polyethylene glycol 3350 17 Gram(s) Oral daily  sodium chloride 0.9%. 1000 milliLiter(s) (10 mL/Hr) IV Continuous <Continuous>  =======================    ENDOCRINE  =====================  Hx of DMT2, glycemic control with Admelog sliding scale and Lantus.   Monitor blood glucose levels.     insulin glargine Injectable (LANTUS) 10 Unit(s) SubCutaneous at bedtime  insulin lispro (ADMELOG) corrective regimen sliding scale   SubCutaneous three times a day before meals  insulin lispro (ADMELOG) corrective regimen sliding scale   SubCutaneous at bedtime  insulin lispro Injectable (ADMELOG) 6 Unit(s) SubCutaneous three times a day before meals  ========================INFECTIOUS DISEASE================  Fevers w/ TMAX 99.9F, leukocytosis with WBC rising 7.41 -> 14.84.   Monitor temperature and trend WBC. Monitor off abx.     Will de-line pt (A line, central line, Molina). Pt is a candidate for transfer to the floors.     This patient is not critically ill.     Patient requires continuous monitoring with bedside rhythm monitoring, pulse ox monitoring, and intermittent blood gas analysis. Care plan discussed with ICU care team.     By signing my name below, I, Aurelia Reyes, attest that this documentation has been prepared under the direction and in the presence of Nilo Kelely MD   Electronically signed: Charles Jones    I, Nilo Kelley, personally performed the services described in this documentation. all medical record entries made by the scribe were at my direction and in my presence. I have reviewed the chart and agree that the record reflects my personal performance and is accurate and complete  Electronically signed: Nilo Kelley MD 21 @ 06:16

## 2021-04-04 NOTE — PROGRESS NOTE ADULT - SUBJECTIVE AND OBJECTIVE BOX
Subjective " hello I am feeling ok"    VITAL SIGNS    Telemetry:      Vital Signs Last 24 Hrs  T(C): 37.5 (21 @ 12:00), Max: 37.7 (21 @ 20:00)  T(F): 99.5 (21 @ 12:00), Max: 99.9 (21 @ 20:00)  HR: 108 (21 @ 16:00) (84 - 112)  BP: 116/67 (21 @ 16:00) (105/65 - 126/89)  RR: 26 (21 @ 16:00) (19 - 54)  SpO2: 95% (21 @ 16:00) (93% - 98%)            @ 07:01  -   @ 07:00  --------------------------------------------------------  IN: 328.7 mL / OUT: 1550 mL / NET: -1221.3 mL     @ 07:01  -   @ 17:14  --------------------------------------------------------  IN: 400 mL / OUT: 745 mL / NET: -345 mL    Daily Weight in k (2021 00:00)    CAPILLARY BLOOD GLUCOSE  POCT Blood Glucose.: 183 mg/dL (2021 16:50)  POCT Blood Glucose.: 210 mg/dL (2021 11:51)  POCT Blood Glucose.: 108 mg/dL (2021 07:55)  POCT Blood Glucose.: 78 mg/dL (2021 22:59)  POCT Blood Glucose.: 92 mg/dL (2021 21:16)  POCT Blood Glucose.: 137 mg/dL (2021 17:27)    MEDICATIONS  (STANDING):  aspirin enteric coated 325 milliGRAM(s) Oral daily  atorvastatin 80 milliGRAM(s) Oral at bedtime  chlorhexidine 2% Cloths 1 Application(s) Topical <User Schedule>  clopidogrel Tablet 75 milliGRAM(s) Oral daily  enoxaparin Injectable 40 milliGRAM(s) SubCutaneous daily  insulin glargine Injectable (LANTUS) 10 Unit(s) SubCutaneous at bedtime  insulin lispro (ADMELOG) corrective regimen sliding scale   SubCutaneous three times a day before meals  insulin lispro (ADMELOG) corrective regimen sliding scale   SubCutaneous at bedtime  insulin lispro Injectable (ADMELOG) 6 Unit(s) SubCutaneous three times a day before meals  losartan 25 milliGRAM(s) Oral at bedtime  metoprolol tartrate 25 milliGRAM(s) Oral two times a day  pantoprazole    Tablet 40 milliGRAM(s) Oral before breakfast  polyethylene glycol 3350 17 Gram(s) Oral daily  sodium chloride 0.9%. 1000 milliLiter(s) (10 mL/Hr) IV Continuous <Continuous>    MEDICATIONS  (PRN):  acetaminophen   Tablet .. 650 milliGRAM(s) Oral every 6 hours PRN Mild Pain (1 - 3)  oxyCODONE    IR 5 milliGRAM(s) Oral every 4 hours PRN Moderate Pain (4 - 6)            Drains:      L Pleural  [ x ]  Drainage:                R Pleural  [  ]  Drainage: 10/555    Pacing Wires        [  ]   Settings:                                  Isolated  [ x ]    :                               PHYSICAL EXAM        Neurology: alert and oriented x 3, nonfocal, no gross deficits    CV :S1 S2 RRR    Sternal Wound :  CDI  sternum  Stable    Lungs: B/l breath sounds on roomair  left chest tube smallP TX on xray   Rt pigtail placed today LWS  555    Abdomen: soft, nontender, nondistended, positive bowel sounds, last bowel movement preop     :DTV  midnight                Extremities:  Equal strength throughout    B/lle warm well perfused Ace wrapped                                       Physical Therapy Rec:   Home  [  ]   Home w/ PT  [  x]  Rehab  [  ]    Discussed with Cardiothoracic Team at AM rounds.

## 2021-04-04 NOTE — PROGRESS NOTE ADULT - ASSESSMENT
49y/o Smooth Female with pmhx of T2DM (diagnosed 2005) and Hypertension (diagnosed 2005) p/w worsening shortness of breath and chest pain for 1 wk on 3/23. Due to insurance issues, was not able to take home metformin. At [facility] ED, pt was found to be in DKA with glucose level in 600s, proBNP 2107, trop 111 with ST depression in V5/V6. Endo was consulted  Pt was given Lasix IV push and admitted to CCU on bipap and nitro gtt. TTE done on 3/24 showed EF 35-40%, moderate to severe LV systolic dysfunction, apical thrombus seen, and trace pericardial effusion, and b/l pleural effusions. Pt was underwent cath on 3/25. LHC/RHC 3/25 showed PCW 30, CO 3.94, CI 2.42, pLAD 80% stenosis, Mid LAD 60% stenosis, Mid circumflex 70%, RCA 75% stenosis, RPDA 70% stenosis, RPLS 60% stenosis at ostium.   Patient underwent cardiac viability study 3/29/21, results reviewed.  Patient was subsequently transferred to [facility] for Preop CABG evaluation with Dr. Baig. Cardiac surgery workup completed.    4/2  Underwent CABG 3 /LAABypass graft,post op course includes  fluid resuscitation inotropic support insulin gtt for  hyperglycemia  Extubate Michelle day  4/3VSS seen by PT dispotion to home  with PT dig loaded   4/4 VSS  deintensified  Right pigtail placed for pleural  effusion 555  Molina d/c voiding. transfered to floor losartan 25 per Dr Baig. ASA Plavix lopressor 25 bid atorvastatin  80 ISS  lantus 10  premeal 8   disposition to home

## 2021-04-04 NOTE — PROCEDURE NOTE - NSPROCDETAILS_GEN_ALL_CORE
Seldinger technique/dressing applied/secured in place/sterile dressing applied/supine position/percutaneous

## 2021-04-05 DIAGNOSIS — J90 PLEURAL EFFUSION, NOT ELSEWHERE CLASSIFIED: ICD-10-CM

## 2021-04-05 DIAGNOSIS — E11.9 TYPE 2 DIABETES MELLITUS WITHOUT COMPLICATIONS: ICD-10-CM

## 2021-04-05 LAB
ANION GAP SERPL CALC-SCNC: 10 MMOL/L — SIGNIFICANT CHANGE UP (ref 5–17)
BUN SERPL-MCNC: 14 MG/DL — SIGNIFICANT CHANGE UP (ref 7–23)
CALCIUM SERPL-MCNC: 9.3 MG/DL — SIGNIFICANT CHANGE UP (ref 8.4–10.5)
CHLORIDE SERPL-SCNC: 101 MMOL/L — SIGNIFICANT CHANGE UP (ref 96–108)
CO2 SERPL-SCNC: 21 MMOL/L — LOW (ref 22–31)
CREAT SERPL-MCNC: 0.69 MG/DL — SIGNIFICANT CHANGE UP (ref 0.5–1.3)
GLUCOSE BLDC GLUCOMTR-MCNC: 122 MG/DL — HIGH (ref 70–99)
GLUCOSE BLDC GLUCOMTR-MCNC: 202 MG/DL — HIGH (ref 70–99)
GLUCOSE BLDC GLUCOMTR-MCNC: 219 MG/DL — HIGH (ref 70–99)
GLUCOSE SERPL-MCNC: 171 MG/DL — HIGH (ref 70–99)
HCT VFR BLD CALC: 33 % — LOW (ref 34.5–45)
HGB BLD-MCNC: 10.3 G/DL — LOW (ref 11.5–15.5)
MCHC RBC-ENTMCNC: 26.7 PG — LOW (ref 27–34)
MCHC RBC-ENTMCNC: 31.2 GM/DL — LOW (ref 32–36)
MCV RBC AUTO: 85.5 FL — SIGNIFICANT CHANGE UP (ref 80–100)
NRBC # BLD: 0 /100 WBCS — SIGNIFICANT CHANGE UP (ref 0–0)
PLATELET # BLD AUTO: 201 K/UL — SIGNIFICANT CHANGE UP (ref 150–400)
POTASSIUM SERPL-MCNC: 4.8 MMOL/L — SIGNIFICANT CHANGE UP (ref 3.5–5.3)
POTASSIUM SERPL-SCNC: 4.8 MMOL/L — SIGNIFICANT CHANGE UP (ref 3.5–5.3)
RBC # BLD: 3.86 M/UL — SIGNIFICANT CHANGE UP (ref 3.8–5.2)
RBC # FLD: 14 % — SIGNIFICANT CHANGE UP (ref 10.3–14.5)
SODIUM SERPL-SCNC: 132 MMOL/L — LOW (ref 135–145)
WBC # BLD: 15.74 K/UL — HIGH (ref 3.8–10.5)
WBC # FLD AUTO: 15.74 K/UL — HIGH (ref 3.8–10.5)

## 2021-04-05 PROCEDURE — 99232 SBSQ HOSP IP/OBS MODERATE 35: CPT

## 2021-04-05 PROCEDURE — 93306 TTE W/DOPPLER COMPLETE: CPT | Mod: 26

## 2021-04-05 PROCEDURE — 71045 X-RAY EXAM CHEST 1 VIEW: CPT | Mod: 26,76

## 2021-04-05 RX ORDER — INSULIN GLARGINE 100 [IU]/ML
12 INJECTION, SOLUTION SUBCUTANEOUS AT BEDTIME
Refills: 0 | Status: DISCONTINUED | OUTPATIENT
Start: 2021-04-05 | End: 2021-04-06

## 2021-04-05 RX ORDER — ASPIRIN/CALCIUM CARB/MAGNESIUM 324 MG
81 TABLET ORAL DAILY
Refills: 0 | Status: DISCONTINUED | OUTPATIENT
Start: 2021-04-05 | End: 2021-04-09

## 2021-04-05 RX ADMIN — OXYCODONE HYDROCHLORIDE 5 MILLIGRAM(S): 5 TABLET ORAL at 09:45

## 2021-04-05 RX ADMIN — OXYCODONE HYDROCHLORIDE 5 MILLIGRAM(S): 5 TABLET ORAL at 20:00

## 2021-04-05 RX ADMIN — OXYCODONE HYDROCHLORIDE 5 MILLIGRAM(S): 5 TABLET ORAL at 05:04

## 2021-04-05 RX ADMIN — Medication 6 UNIT(S): at 08:16

## 2021-04-05 RX ADMIN — Medication 6 UNIT(S): at 12:14

## 2021-04-05 RX ADMIN — Medication 81 MILLIGRAM(S): at 12:15

## 2021-04-05 RX ADMIN — CLOPIDOGREL BISULFATE 75 MILLIGRAM(S): 75 TABLET, FILM COATED ORAL at 12:15

## 2021-04-05 RX ADMIN — OXYCODONE HYDROCHLORIDE 5 MILLIGRAM(S): 5 TABLET ORAL at 09:15

## 2021-04-05 RX ADMIN — OXYCODONE HYDROCHLORIDE 5 MILLIGRAM(S): 5 TABLET ORAL at 05:28

## 2021-04-05 RX ADMIN — POLYETHYLENE GLYCOL 3350 17 GRAM(S): 17 POWDER, FOR SOLUTION ORAL at 12:15

## 2021-04-05 RX ADMIN — SODIUM CHLORIDE 3 MILLILITER(S): 9 INJECTION INTRAMUSCULAR; INTRAVENOUS; SUBCUTANEOUS at 21:56

## 2021-04-05 RX ADMIN — ATORVASTATIN CALCIUM 80 MILLIGRAM(S): 80 TABLET, FILM COATED ORAL at 22:25

## 2021-04-05 RX ADMIN — Medication 2: at 08:17

## 2021-04-05 RX ADMIN — Medication 6 UNIT(S): at 17:15

## 2021-04-05 RX ADMIN — LOSARTAN POTASSIUM 25 MILLIGRAM(S): 100 TABLET, FILM COATED ORAL at 22:26

## 2021-04-05 RX ADMIN — INSULIN GLARGINE 12 UNIT(S): 100 INJECTION, SOLUTION SUBCUTANEOUS at 22:20

## 2021-04-05 RX ADMIN — Medication 25 MILLIGRAM(S): at 17:16

## 2021-04-05 RX ADMIN — PANTOPRAZOLE SODIUM 40 MILLIGRAM(S): 20 TABLET, DELAYED RELEASE ORAL at 05:05

## 2021-04-05 RX ADMIN — ENOXAPARIN SODIUM 40 MILLIGRAM(S): 100 INJECTION SUBCUTANEOUS at 12:15

## 2021-04-05 RX ADMIN — Medication 25 MILLIGRAM(S): at 05:04

## 2021-04-05 RX ADMIN — OXYCODONE HYDROCHLORIDE 5 MILLIGRAM(S): 5 TABLET ORAL at 19:06

## 2021-04-05 RX ADMIN — SODIUM CHLORIDE 3 MILLILITER(S): 9 INJECTION INTRAMUSCULAR; INTRAVENOUS; SUBCUTANEOUS at 05:00

## 2021-04-05 RX ADMIN — Medication 4: at 17:15

## 2021-04-05 RX ADMIN — SODIUM CHLORIDE 3 MILLILITER(S): 9 INJECTION INTRAMUSCULAR; INTRAVENOUS; SUBCUTANEOUS at 15:16

## 2021-04-05 NOTE — CONSULT NOTE ADULT - SUBJECTIVE AND OBJECTIVE BOX
Patient seen and evaluated at bedside    Chief Complaint:  dyspnea    HPI:  51y/o Smooth Female with pmhx of T2DM (diagnosed ) and Hypertension (diagnosed ) p/w worsening shortness of breath and chest pain for 1 wk on 3/23. Due to insurance issues, was not able to take home metformin. Last 1 week she was unable to perform her daily routines due to sob with minimal exertion with associated cough and orthopnea requiring 2-3 pillow to sleep. At [facility] ED, pt was found to be in DKA with glucose level in 600s, proBNP 2107, trop 111 with ST depression in V5/V6. Endo was consulted and was placed on insulin gtt and gradually transitioned to SQ insulin. Pt was given Lasix IV push and admitted to CCU on bipap and nitro gtt. TTE done on 3/24 showed EF 35-40%, moderate to severe LV systolic dysfunction, apical thrombus seen, and trace pericardial effusion, and b/l pleural effusions. Pt was warranted for ischemic evaluation and underwent cath on 3/25. LHC/RHC 3/25 showed PCW 30, CO 3.94, CI 2.42, pLAD 80% stenosis, Mid LAD 60% stenosis, Mid circumflex 70%, RCA 75% stenosis, RPDA 70% stenosis, RPLS 60% stenosis at ostium.    (29 Mar 2021 23:16)      PMHx:   T2DM (type 2 diabetes mellitus)    Benign essential HTN    HTN (hypertension)    T2DM (type 2 diabetes mellitus)        PSHx:   No significant past surgical history    H/O  section        Allergies:  No Known Allergies      Home Meds:    Current Medications:   acetaminophen   Tablet .. 650 milliGRAM(s) Oral every 6 hours PRN  aspirin enteric coated 81 milliGRAM(s) Oral daily  atorvastatin 80 milliGRAM(s) Oral at bedtime  clopidogrel Tablet 75 milliGRAM(s) Oral daily  enoxaparin Injectable 40 milliGRAM(s) SubCutaneous daily  insulin glargine Injectable (LANTUS) 10 Unit(s) SubCutaneous at bedtime  insulin lispro (ADMELOG) corrective regimen sliding scale   SubCutaneous three times a day before meals  insulin lispro (ADMELOG) corrective regimen sliding scale   SubCutaneous at bedtime  insulin lispro Injectable (ADMELOG) 6 Unit(s) SubCutaneous three times a day before meals  losartan 25 milliGRAM(s) Oral at bedtime  metoprolol tartrate 25 milliGRAM(s) Oral two times a day  oxyCODONE    IR 5 milliGRAM(s) Oral every 4 hours PRN  pantoprazole    Tablet 40 milliGRAM(s) Oral before breakfast  polyethylene glycol 3350 17 Gram(s) Oral daily  sodium chloride 0.9% lock flush 3 milliLiter(s) IV Push every 8 hours      FAMILY HISTORY:       Social History:  Smoking History: denies  Alcohol Use: denies  Drug Use: denies    REVIEW OF SYSTEMS:  Constitutional:     [x ] negative [ ] fevers [ ] chills [ ] weight loss [ ] weight gain  HEENT:                  [x ] negative [ ] dry eyes [ ] eye irritation [ ] postnasal drip [ ] nasal congestion  CV:                         [ x] negative  [ ] chest pain [ ] orthopnea [ ] palpitations [ ] murmur  Resp:                     [x ] negative [ ] cough [ ] shortness of breath [ ] dyspnea [ ] wheezing [ ] sputum [ ]hemoptysis  GI:                          [ x] negative [ ] nausea [ ] vomiting [ ] diarrhea [ ] constipation [ ] abd pain [ ] dysphagia   :                        [ x] negative [ ] dysuria [ ] nocturia [ ] hematuria [ ] increased urinary frequency  Musculoskeletal: [x ] negative [ ] back pain [ ] myalgias [ ] arthralgias [ ] fracture  Skin:                       [ x] negative [ ] rash [ ] itch  Neurological:        [ x] negative [ ] headache [ ] dizziness [ ] syncope [ ] weakness [ ] numbness  Psychiatric:           [ x] negative [ ] anxiety [ ] depression  Endocrine:            [ x] negative [ ] diabetes [ ] thyroid problem  Heme/Lymph:      [ x] negative [ ] anemia [ ] bleeding problem  Allergic/Immune: [ x] negative [ ] itchy eyes [ ] nasal discharge [ ] hives [ ] angioedema    [ x] All other systems negative  [ ] Unable to assess ROS due to      Physical Exam:  T(F): 98.6 (-), Max: 99.1 (-)  HR: 101 () (83 - 108)  BP: 102/70 () (91/60 - 128/84)  RR: 18 (-)  SpO2: 93% (-)  GENERAL: No acute distress, well-developed  HEAD:  Atraumatic, Normocephalic  ENT: EOMI, PERRLA, conjunctiva and sclera clear, Neck supple, No JVD, moist mucosa  CHEST/LUNG: Clear to auscultation bilaterally; No wheeze, equal breath sounds bilaterally   BACK: No spinal tenderness  HEART: Regular rate and rhythm; No murmurs, rubs, or gallops  ABDOMEN: Soft, Nontender, Nondistended; Bowel sounds present  EXTREMITIES:  No clubbing, cyanosis, or edema  PSYCH: Nl behavior, nl affect  NEUROLOGY: AAOx3, non-focal, cranial nerves intact  SKIN: Normal color, No rashes or lesions  LINES:    Cardiovascular Diagnostic Testing:    ECG: Personally reviewed:    Echo: Personally reviewed:    Stress Testing:    Cath:    Imaging:    CXR: Personally reviewed    Labs: Personally reviewed                        10.3   15.74 )-----------( 201      ( 2021 09:55 )             33.0     04-05    132<L>  |  101  |  14  ----------------------------<  171<H>  4.8   |  21<L>  |  0.69    Ca    9.3      2021 09:55  Phos  3.0     04-04  Mg     2.2     04-04    TPro  5.2<L>  /  Alb  3.2<L>  /  TBili  0.5  /  DBili  x   /  AST  91<H>  /  ALT  31  /  AlkPhos  54  04-04            Thyroid Stimulating Hormone, Serum: 5.37 uIU/mL ( @ 03:59)

## 2021-04-05 NOTE — PROGRESS NOTE ADULT - PROBLEM SELECTOR PLAN 1
Goal glucose 100-180 mg/dl  FS AC & HS  Increase Lantus to 12 units SQ qHS   C/w Admelog 6 units SQ TID before meals (Hold if NPO/not eating meal)  Continue Admelog moderate dose correctional scale SQ before meals and moderate scale at bedtime  Consistent carbohydrate diet    Discharge Plan:  Semglee PEN is covered, basal/bolus vs basal/orals. Doses TBD  Ensure she has supplies, including glucometer, glucose test strips, lancets, alcohol swabs and insulin PEN needles   F/u with outpatient endocrine Dr. Mathews.

## 2021-04-05 NOTE — PROGRESS NOTE ADULT - ASSESSMENT
51y/o Smooth Female with pmhx of T2DM (diagnosed 2005) and Hypertension (diagnosed 2005) p/w worsening shortness of breath and chest pain for 1 wk on 3/23. Due to insurance issues, was not able to take home metformin. At [facility] ED, pt was found to be in DKA with glucose level in 600s, proBNP 2107, trop 111 with ST depression in V5/V6. Endo was consulted  Pt was given Lasix IV push and admitted to CCU on bipap and nitro gtt. TTE done on 3/24 showed EF 35-40%, moderate to severe LV systolic dysfunction, apical thrombus seen, and trace pericardial effusion, and b/l pleural effusions. Pt was underwent cath on 3/25. LHC/RHC 3/25 showed PCW 30, CO 3.94, CI 2.42, pLAD 80% stenosis, Mid LAD 60% stenosis, Mid circumflex 70%, RCA 75% stenosis, RPDA 70% stenosis, RPLS 60% stenosis at ostium.   Patient underwent cardiac viability study 3/29/21, results reviewed.  Patient was subsequently transferred to [facility] for Preop CABG evaluation with Dr. Baig. Cardiac surgery workup completed.    4/2  Underwent CABG 3 /LAABypass graft,post op course includes  fluid resuscitation inotropic support insulin gtt for  hyperglycemia  Extubate Michelle day  4/3VSS seen by PT dispotion to home  with PT dig loaded   4/4 VSS  deintensified  Right pigtail placed for pleural  effusion 555  Molina d/c voiding. transfered to floor losartan 25 per Dr Baig. ASA Plavix lopressor 25 bid atorvastatin  80 ISS  lantus 10  premeal 8   disposition to home    49y/o Smooth Female with pmhx of T2DM (diagnosed 2005) and Hypertension (diagnosed 2005) p/w worsening shortness of breath and chest pain for 1 wk on 3/23. Due to insurance issues, was not able to take home metformin. At [facility] ED, pt was found to be in DKA with glucose level in 600s, proBNP 2107, trop 111 with ST depression in V5/V6. Endo was consulted  Pt was given Lasix IV push and admitted to CCU on bipap and nitro gtt. TTE done on 3/24 showed EF 35-40%, moderate to severe LV systolic dysfunction, apical thrombus seen, and trace pericardial effusion, and b/l pleural effusions. Pt was underwent cath on 3/25. LHC/RHC 3/25 showed PCW 30, CO 3.94, CI 2.42, pLAD 80% stenosis, Mid LAD 60% stenosis, Mid circumflex 70%, RCA 75% stenosis, RPDA 70% stenosis, RPLS 60% stenosis at ostium.   Patient underwent cardiac viability study 3/29/21, results reviewed.  Patient was subsequently transferred to [facility] for Preop CABG evaluation with Dr. Baig. Cardiac surgery workup completed.    4/2  Underwent CABG 3 /LAABypass graft,post op course includes  fluid resuscitation inotropic support insulin gtt for  hyperglycemia  Extubate Michelle day  4/3VSS seen by PT dispotion to home  with PT dig loaded   4/4 VSS  deintensified  Right pigtail placed for pleural  effusion 555  Molina d/c voiding. transfered to floor losartan 25 per Dr Baig. ASA Plavix lopressor 25 bid atorvastatin  80 ISS  lantus 10  premeal 8   disposition to home   4/5 VSS; RSR 80--110; d/c right pigtail as per Dr. Baig this am; ck post removal chest xray; ck echo; change asa 81 qd  Discharge planning- home with insulin as per evelia

## 2021-04-05 NOTE — PROGRESS NOTE ADULT - PROBLEM SELECTOR PLAN 1
s/p CABG/LEANA  ASA Plavix   Lop 25 bid  ator 89 Losartan 25  ISS lantus premeal   Maintain left CT lws  Mainatin rt pigtail LWS   B/lle ace wrap  + pw   incentive spirometry ambulate tid  disposition to home with PT continue postop care  continue asa81 plavix statin and lopressor 25 mg po bid  d/c right pigtail this am  ck Echo   pain management  pulm toilet  increase activity as tolerated  Discharge planning- home with insulin as per endo

## 2021-04-05 NOTE — PROGRESS NOTE ADULT - SUBJECTIVE AND OBJECTIVE BOX
Chief Complaint/Follow-up on: DM2 f/u    Subjective: Offered patient dom  patient declined stated she can speak and understand English.  Denies s/s hypoglycemia, states she is feeling well, ate eggs/coffee/diet juice with breakfast today and that she feels like her appetite is increasing.       MEDICATIONS  (STANDING):  aspirin enteric coated 81 milliGRAM(s) Oral daily  atorvastatin 80 milliGRAM(s) Oral at bedtime  clopidogrel Tablet 75 milliGRAM(s) Oral daily  enoxaparin Injectable 40 milliGRAM(s) SubCutaneous daily  insulin glargine Injectable (LANTUS) 12 Unit(s) SubCutaneous at bedtime  insulin lispro (ADMELOG) corrective regimen sliding scale   SubCutaneous three times a day before meals  insulin lispro (ADMELOG) corrective regimen sliding scale   SubCutaneous at bedtime  insulin lispro Injectable (ADMELOG) 6 Unit(s) SubCutaneous three times a day before meals  losartan 25 milliGRAM(s) Oral at bedtime  metoprolol tartrate 25 milliGRAM(s) Oral two times a day  pantoprazole    Tablet 40 milliGRAM(s) Oral before breakfast  polyethylene glycol 3350 17 Gram(s) Oral daily  sodium chloride 0.9% lock flush 3 milliLiter(s) IV Push every 8 hours    MEDICATIONS  (PRN):  acetaminophen   Tablet .. 650 milliGRAM(s) Oral every 6 hours PRN Mild Pain (1 - 3)  oxyCODONE    IR 5 milliGRAM(s) Oral every 4 hours PRN Moderate Pain (4 - 6)      PHYSICAL EXAM:  VITALS: T(C): 37 (04-05-21 @ 12:54)  T(F): 98.6 (04-05-21 @ 12:54), Max: 99.1 (04-04-21 @ 19:59)  HR: 116 (04-05-21 @ 17:17) (83 - 116)  BP: 133/83 (04-05-21 @ 17:17) (91/60 - 133/83)  RR:  (18 - 18)  SpO2:  (93% - 94%)  Wt(kg): --  GENERAL: NAD, well-groomed, well-developed  RESPIRATORY: Clear to auscultation bilaterally; No rales, rhonchi, wheezing, or rubs  CARDIOVASCULAR: Regular rate and rhythm; No murmurs; no peripheral edema extremities warm/dry; Left Chest tube dressing C/D/I ,anterior sternal incision cdi  GI: Soft, nontender, non distended, normal bowel sounds     POCT Blood Glucose.: 202 mg/dL (04-05-21 @ 16:27)  POCT Blood Glucose.: 122 mg/dL (04-05-21 @ 11:42)  POCT Blood Glucose.: 173 mg/dL (04-05-21 @ 07:53)  POCT Blood Glucose.: 155 mg/dL (04-04-21 @ 21:21)  POCT Blood Glucose.: 183 mg/dL (04-04-21 @ 16:50)  POCT Blood Glucose.: 210 mg/dL (04-04-21 @ 11:51)  POCT Blood Glucose.: 108 mg/dL (04-04-21 @ 07:55)  POCT Blood Glucose.: 78 mg/dL (04-03-21 @ 22:59)  POCT Blood Glucose.: 92 mg/dL (04-03-21 @ 21:16)  POCT Blood Glucose.: 137 mg/dL (04-03-21 @ 17:27)  POCT Blood Glucose.: 103 mg/dL (04-03-21 @ 12:50)  POCT Blood Glucose.: 84 mg/dL (04-03-21 @ 12:14)  POCT Blood Glucose.: 127 mg/dL (04-03-21 @ 10:10)  POCT Blood Glucose.: 122 mg/dL (04-03-21 @ 08:57)  POCT Blood Glucose.: 135 mg/dL (04-03-21 @ 08:12)  POCT Blood Glucose.: 143 mg/dL (04-03-21 @ 06:45)  POCT Blood Glucose.: 85 mg/dL (04-03-21 @ 04:57)  POCT Blood Glucose.: 108 mg/dL (04-03-21 @ 03:12)  POCT Blood Glucose.: 130 mg/dL (04-03-21 @ 01:53)  POCT Blood Glucose.: 122 mg/dL (04-02-21 @ 20:56)    04-05    132<L>  |  101  |  14  ----------------------------<  171<H>  4.8   |  21<L>  |  0.69    EGFR if : 118  EGFR if non : 102    Ca    9.3      04-05  Mg     2.2     04-04  Phos  3.0     04-04    TPro  5.2<L>  /  Alb  3.2<L>  /  TBili  0.5  /  DBili  x   /  AST  91<H>  /  ALT  31  /  AlkPhos  54  04-04          Thyroid Function Tests:  03-30 @ 03:59 TSH 5.37   03-23 @ 14:38 TSH 1.77

## 2021-04-05 NOTE — PROGRESS NOTE ADULT - ASSESSMENT
50 year old woman with PMH HTN, HLD, uncontrolled DM2 with CAD, p/w worsening shortness of breath and chest pain for 1 week, admitted for management of acute CHF as well as hyperglycemia the 600's ,s/p CABG x3 on 4/2 with CTICU admission & insulin gtt post op, endocrine consult called for management of uncontrolled DM2. A1c is 13.9%. BG ranging 108-210 from yesterday lunch time; will increase regimen insulin to achieve glycemic control with goal of -180mg/dl.

## 2021-04-05 NOTE — PROGRESS NOTE ADULT - SUBJECTIVE AND OBJECTIVE BOX
VITAL SIGNS    Telemetry:    Vital Signs Last 24 Hrs  T(C): 36.9 (21 @ 04:31), Max: 37.5 (21 @ 12:00)  T(F): 98.4 (21 @ 04:31), Max: 99.5 (21 @ 12:00)  HR: 99 (21 @ 04:31) (83 - 112)  BP: 119/79 (21 @ 04:31) (91/60 - 128/84)  RR: 18 (21 @ 04:31) (18 - 54)  SpO2: 94% (21 @ 04:31) (93% - 98%)             @ 07:01  -   @ 07:00  --------------------------------------------------------  IN: 400 mL / OUT: 1075 mL / NET: -675 mL     @ 07:01  -   @ 08:59  --------------------------------------------------------  IN: 0 mL / OUT: 110 mL / NET: -110 mL       Daily     Daily Weight in k (2021 07:05)  Admit Wt: Drug Dosing Weight  Height (cm): 160 (30 Mar 2021 05:57)  Weight (kg): 55.7 (2021 21:10)  BMI (kg/m2): 21.8 (2021 21:10)  BSA (m2): 1.57 (2021 21:10)      CAPILLARY BLOOD GLUCOSE      POCT Blood Glucose.: 173 mg/dL (2021 07:53)  POCT Blood Glucose.: 155 mg/dL (2021 21:21)  POCT Blood Glucose.: 183 mg/dL (2021 16:50)  POCT Blood Glucose.: 210 mg/dL (2021 11:51)          acetaminophen   Tablet .. 650 milliGRAM(s) Oral every 6 hours PRN  aspirin enteric coated 81 milliGRAM(s) Oral daily  atorvastatin 80 milliGRAM(s) Oral at bedtime  clopidogrel Tablet 75 milliGRAM(s) Oral daily  enoxaparin Injectable 40 milliGRAM(s) SubCutaneous daily  insulin glargine Injectable (LANTUS) 10 Unit(s) SubCutaneous at bedtime  insulin lispro (ADMELOG) corrective regimen sliding scale   SubCutaneous three times a day before meals  insulin lispro (ADMELOG) corrective regimen sliding scale   SubCutaneous at bedtime  insulin lispro Injectable (ADMELOG) 6 Unit(s) SubCutaneous three times a day before meals  losartan 25 milliGRAM(s) Oral at bedtime  metoprolol tartrate 25 milliGRAM(s) Oral two times a day  oxyCODONE    IR 5 milliGRAM(s) Oral every 4 hours PRN  pantoprazole    Tablet 40 milliGRAM(s) Oral before breakfast  polyethylene glycol 3350 17 Gram(s) Oral daily  sodium chloride 0.9% lock flush 3 milliLiter(s) IV Push every 8 hours      PHYSICAL EXAM    Subjective: "Hi.   Neurology: alert and oriented x 3, nonfocal, no gross deficits  CV : tele:  RSR  Sternal Wound :  CDI with dressing , Stable  Lungs: clear. RR easy, unlabored   Abdomen: soft, nontender, nondistended, positive bowel sounds, bowel movement   Neg N/V/D   :  pt voiding without difficulty   Extremities:   MOSQUEDA; edema, neg calf tenderness.   PPP bilaterally      PW:  Chest tubes:                 VITAL SIGNS    Telemetry:  rsr    Vital Signs Last 24 Hrs  T(C): 36.9 (21 @ 04:31), Max: 37.5 (21 @ 12:00)  T(F): 98.4 (21 @ 04:31), Max: 99.5 (21 @ 12:00)  HR: 99 (21 @ 04:31) (83 - 112)  BP: 119/79 (21 @ 04:31) (91/60 - 128/84)  RR: 18 (21 @ 04:31) (18 - 54)  SpO2: 94% (21 @ 04:31) (93% - 98%)             07:01  -   @ 07:00  --------------------------------------------------------  IN: 400 mL / OUT: 1075 mL / NET: -675 mL     @ 07:01  -   @ 08:59  --------------------------------------------------------  IN: 0 mL / OUT: 110 mL / NET: -110 mL       Daily     Daily Weight in k (2021 07:05)  Admit Wt: Drug Dosing Weight  Height (cm): 160 (30 Mar 2021 05:57)  Weight (kg): 55.7 (2021 21:10)  BMI (kg/m2): 21.8 (2021 21:10)  BSA (m2): 1.57 (2021 21:10)      CAPILLARY BLOOD GLUCOSE      POCT Blood Glucose.: 173 mg/dL (2021 07:53)  POCT Blood Glucose.: 155 mg/dL (2021 21:21)  POCT Blood Glucose.: 183 mg/dL (2021 16:50)  POCT Blood Glucose.: 210 mg/dL (2021 11:51)          acetaminophen   Tablet .. 650 milliGRAM(s) Oral every 6 hours PRN  aspirin enteric coated 81 milliGRAM(s) Oral daily  atorvastatin 80 milliGRAM(s) Oral at bedtime  clopidogrel Tablet 75 milliGRAM(s) Oral daily  enoxaparin Injectable 40 milliGRAM(s) SubCutaneous daily  insulin glargine Injectable (LANTUS) 10 Unit(s) SubCutaneous at bedtime  insulin lispro (ADMELOG) corrective regimen sliding scale   SubCutaneous three times a day before meals  insulin lispro (ADMELOG) corrective regimen sliding scale   SubCutaneous at bedtime  insulin lispro Injectable (ADMELOG) 6 Unit(s) SubCutaneous three times a day before meals  losartan 25 milliGRAM(s) Oral at bedtime  metoprolol tartrate 25 milliGRAM(s) Oral two times a day  oxyCODONE    IR 5 milliGRAM(s) Oral every 4 hours PRN  pantoprazole    Tablet 40 milliGRAM(s) Oral before breakfast  polyethylene glycol 3350 17 Gram(s) Oral daily  sodium chloride 0.9% lock flush 3 milliLiter(s) IV Push every 8 hours      PHYSICAL EXAM    Subjective: "Huh."   Neurology: alert and oriented x 3, nonfocal, no gross deficits  CV : tele:  [id number]   Sternal Wound :  CDI with dressing; sternum stable   Lungs: clear. RR easy, unlabored   Abdomen: soft, nontender, nondistended, positive bowel sounds, neg bowel movement   Neg N/V/D   :  pt voiding without difficulty   Extremities:   MOSQUEDA; neg LE edema, neg calf tenderness.   PPP bilaterally; bilateral SVG sites cdi chucho       PW: no  Chest tubes: + rt pigtail draining minimal serous- sanguinous drainage; + left CT- draining minimal serous- sanguinous drainage

## 2021-04-06 LAB
ANION GAP SERPL CALC-SCNC: 11 MMOL/L — SIGNIFICANT CHANGE UP (ref 5–17)
BUN SERPL-MCNC: 13 MG/DL — SIGNIFICANT CHANGE UP (ref 7–23)
CALCIUM SERPL-MCNC: 9.2 MG/DL — SIGNIFICANT CHANGE UP (ref 8.4–10.5)
CHLORIDE SERPL-SCNC: 101 MMOL/L — SIGNIFICANT CHANGE UP (ref 96–108)
CO2 SERPL-SCNC: 22 MMOL/L — SIGNIFICANT CHANGE UP (ref 22–31)
CREAT SERPL-MCNC: 0.56 MG/DL — SIGNIFICANT CHANGE UP (ref 0.5–1.3)
GLUCOSE BLDC GLUCOMTR-MCNC: 190 MG/DL — HIGH (ref 70–99)
GLUCOSE BLDC GLUCOMTR-MCNC: 196 MG/DL — HIGH (ref 70–99)
GLUCOSE BLDC GLUCOMTR-MCNC: 197 MG/DL — HIGH (ref 70–99)
GLUCOSE BLDC GLUCOMTR-MCNC: 210 MG/DL — HIGH (ref 70–99)
GLUCOSE SERPL-MCNC: 193 MG/DL — HIGH (ref 70–99)
HCT VFR BLD CALC: 33.2 % — LOW (ref 34.5–45)
HGB BLD-MCNC: 10.3 G/DL — LOW (ref 11.5–15.5)
MAGNESIUM SERPL-MCNC: 2 MG/DL — SIGNIFICANT CHANGE UP (ref 1.6–2.6)
MCHC RBC-ENTMCNC: 26.5 PG — LOW (ref 27–34)
MCHC RBC-ENTMCNC: 31 GM/DL — LOW (ref 32–36)
MCV RBC AUTO: 85.6 FL — SIGNIFICANT CHANGE UP (ref 80–100)
NRBC # BLD: 0 /100 WBCS — SIGNIFICANT CHANGE UP (ref 0–0)
PHOSPHATE SERPL-MCNC: 1.4 MG/DL — LOW (ref 2.5–4.5)
PLATELET # BLD AUTO: 259 K/UL — SIGNIFICANT CHANGE UP (ref 150–400)
POTASSIUM SERPL-MCNC: 4.4 MMOL/L — SIGNIFICANT CHANGE UP (ref 3.5–5.3)
POTASSIUM SERPL-SCNC: 4.4 MMOL/L — SIGNIFICANT CHANGE UP (ref 3.5–5.3)
RBC # BLD: 3.88 M/UL — SIGNIFICANT CHANGE UP (ref 3.8–5.2)
RBC # FLD: 13.9 % — SIGNIFICANT CHANGE UP (ref 10.3–14.5)
SARS-COV-2 RNA SPEC QL NAA+PROBE: SIGNIFICANT CHANGE UP
SODIUM SERPL-SCNC: 134 MMOL/L — LOW (ref 135–145)
WBC # BLD: 11.85 K/UL — HIGH (ref 3.8–10.5)
WBC # FLD AUTO: 11.85 K/UL — HIGH (ref 3.8–10.5)

## 2021-04-06 PROCEDURE — 99232 SBSQ HOSP IP/OBS MODERATE 35: CPT

## 2021-04-06 RX ORDER — FUROSEMIDE 40 MG
20 TABLET ORAL DAILY
Refills: 0 | Status: DISCONTINUED | OUTPATIENT
Start: 2021-04-06 | End: 2021-04-09

## 2021-04-06 RX ORDER — INSULIN LISPRO 100/ML
10 VIAL (ML) SUBCUTANEOUS
Refills: 0 | Status: DISCONTINUED | OUTPATIENT
Start: 2021-04-06 | End: 2021-04-08

## 2021-04-06 RX ORDER — INSULIN GLARGINE 100 [IU]/ML
14 INJECTION, SOLUTION SUBCUTANEOUS AT BEDTIME
Refills: 0 | Status: DISCONTINUED | OUTPATIENT
Start: 2021-04-06 | End: 2021-04-09

## 2021-04-06 RX ORDER — METOPROLOL TARTRATE 50 MG
50 TABLET ORAL DAILY
Refills: 0 | Status: DISCONTINUED | OUTPATIENT
Start: 2021-04-06 | End: 2021-04-09

## 2021-04-06 RX ORDER — SPIRONOLACTONE 25 MG/1
25 TABLET, FILM COATED ORAL DAILY
Refills: 0 | Status: DISCONTINUED | OUTPATIENT
Start: 2021-04-06 | End: 2021-04-09

## 2021-04-06 RX ADMIN — Medication 4: at 07:57

## 2021-04-06 RX ADMIN — CLOPIDOGREL BISULFATE 75 MILLIGRAM(S): 75 TABLET, FILM COATED ORAL at 11:26

## 2021-04-06 RX ADMIN — Medication 6 UNIT(S): at 11:25

## 2021-04-06 RX ADMIN — Medication 2: at 17:23

## 2021-04-06 RX ADMIN — INSULIN GLARGINE 14 UNIT(S): 100 INJECTION, SOLUTION SUBCUTANEOUS at 22:10

## 2021-04-06 RX ADMIN — Medication 6 UNIT(S): at 17:22

## 2021-04-06 RX ADMIN — OXYCODONE HYDROCHLORIDE 5 MILLIGRAM(S): 5 TABLET ORAL at 08:27

## 2021-04-06 RX ADMIN — PANTOPRAZOLE SODIUM 40 MILLIGRAM(S): 20 TABLET, DELAYED RELEASE ORAL at 05:24

## 2021-04-06 RX ADMIN — ENOXAPARIN SODIUM 40 MILLIGRAM(S): 100 INJECTION SUBCUTANEOUS at 11:26

## 2021-04-06 RX ADMIN — SODIUM CHLORIDE 3 MILLILITER(S): 9 INJECTION INTRAMUSCULAR; INTRAVENOUS; SUBCUTANEOUS at 21:06

## 2021-04-06 RX ADMIN — SODIUM CHLORIDE 3 MILLILITER(S): 9 INJECTION INTRAMUSCULAR; INTRAVENOUS; SUBCUTANEOUS at 05:35

## 2021-04-06 RX ADMIN — OXYCODONE HYDROCHLORIDE 5 MILLIGRAM(S): 5 TABLET ORAL at 19:24

## 2021-04-06 RX ADMIN — Medication 2: at 11:25

## 2021-04-06 RX ADMIN — Medication 50 MILLIGRAM(S): at 11:32

## 2021-04-06 RX ADMIN — Medication 20 MILLIGRAM(S): at 11:24

## 2021-04-06 RX ADMIN — SODIUM CHLORIDE 3 MILLILITER(S): 9 INJECTION INTRAMUSCULAR; INTRAVENOUS; SUBCUTANEOUS at 12:10

## 2021-04-06 RX ADMIN — Medication 81 MILLIGRAM(S): at 11:26

## 2021-04-06 RX ADMIN — SPIRONOLACTONE 25 MILLIGRAM(S): 25 TABLET, FILM COATED ORAL at 11:24

## 2021-04-06 RX ADMIN — Medication 25 MILLIGRAM(S): at 05:24

## 2021-04-06 RX ADMIN — ATORVASTATIN CALCIUM 80 MILLIGRAM(S): 80 TABLET, FILM COATED ORAL at 21:02

## 2021-04-06 RX ADMIN — OXYCODONE HYDROCHLORIDE 5 MILLIGRAM(S): 5 TABLET ORAL at 07:57

## 2021-04-06 RX ADMIN — LOSARTAN POTASSIUM 25 MILLIGRAM(S): 100 TABLET, FILM COATED ORAL at 21:02

## 2021-04-06 RX ADMIN — Medication 6 UNIT(S): at 07:56

## 2021-04-06 RX ADMIN — OXYCODONE HYDROCHLORIDE 5 MILLIGRAM(S): 5 TABLET ORAL at 18:54

## 2021-04-06 NOTE — PROGRESS NOTE ADULT - SUBJECTIVE AND OBJECTIVE BOX
DIABETES FOLLOW UP : Seen earlier today    INTERVAL HX: Offered  pt refused stated she speaks and understands english. Pt states her appetite is increased today for breakfast she had eggs/coffee; lunch had veggie inder and half of salad. States she feels well offer no complaints.       Review of Systems:  General: As above  Cardiovascular: No chest pain, palpitations  Respiratory: No SOB, no cough  GI: No nausea, vomiting, abdominal pain  Endocrine: no polyuria, polydipsia or S&Sx of hypoglycemia    Allergies    No Known Allergies      MEDICATIONS  (STANDING):  aspirin enteric coated 81 milliGRAM(s) Oral daily  atorvastatin 80 milliGRAM(s) Oral at bedtime  clopidogrel Tablet 75 milliGRAM(s) Oral daily  enoxaparin Injectable 40 milliGRAM(s) SubCutaneous daily  furosemide    Tablet 20 milliGRAM(s) Oral daily  insulin glargine Injectable (LANTUS) 12 Unit(s) SubCutaneous at bedtime  insulin lispro (ADMELOG) corrective regimen sliding scale   SubCutaneous three times a day before meals  insulin lispro (ADMELOG) corrective regimen sliding scale   SubCutaneous at bedtime  insulin lispro Injectable (ADMELOG) 6 Unit(s) SubCutaneous three times a day before meals  losartan 25 milliGRAM(s) Oral at bedtime  metoprolol succinate ER 50 milliGRAM(s) Oral daily  pantoprazole    Tablet 40 milliGRAM(s) Oral before breakfast  polyethylene glycol 3350 17 Gram(s) Oral daily  sodium chloride 0.9% lock flush 3 milliLiter(s) IV Push every 8 hours  spironolactone 25 milliGRAM(s) Oral daily      PHYSICAL EXAM:  VITALS: T(C): 37.1 (04-06-21 @ 13:00)  T(F): 98.8 (04-06-21 @ 13:00), Max: 99.1 (04-05-21 @ 19:30)  HR: 128 (04-06-21 @ 14:40) (91 - 128)  BP: 114/77 (04-06-21 @ 14:40) (102/69 - 120/81)  RR:  (18 - 18)  SpO2:  (94% - 97%)  Wt(kg): --  GENERAL: female sitting in chair in NAD  RESPIRATORY: Clear to auscultation bilaterally; No rales, rhonchi, wheezing, or rubs  CARDIOVASCULAR: Regular rate and rhythm; No murmurs; no peripheral edema extremities warm/dry; Left Chest tube dressing C/D/I ,anterior sternal incision cdi  GI: Soft, nontender, non distended, normal bowel sounds    LABS:  POCT Blood Glucose.: 196 mg/dL (04-06-21 @ 16:43)  POCT Blood Glucose.: 197 mg/dL (04-06-21 @ 11:12)  POCT Blood Glucose.: 210 mg/dL (04-06-21 @ 07:38)  POCT Blood Glucose.: 219 mg/dL (04-05-21 @ 21:15)  POCT Blood Glucose.: 202 mg/dL (04-05-21 @ 16:27)  POCT Blood Glucose.: 122 mg/dL (04-05-21 @ 11:42)  POCT Blood Glucose.: 173 mg/dL (04-05-21 @ 07:53)  POCT Blood Glucose.: 155 mg/dL (04-04-21 @ 21:21)  POCT Blood Glucose.: 183 mg/dL (04-04-21 @ 16:50)  POCT Blood Glucose.: 210 mg/dL (04-04-21 @ 11:51)  POCT Blood Glucose.: 108 mg/dL (04-04-21 @ 07:55)  POCT Blood Glucose.: 78 mg/dL (04-03-21 @ 22:59)  POCT Blood Glucose.: 92 mg/dL (04-03-21 @ 21:16)                            10.3   11.85 )-----------( 259      ( 06 Apr 2021 10:08 )             33.2       04-06    134<L>  |  101  |  13  ----------------------------<  193<H>  4.4   |  22  |  0.56      EGFR if non : 109    Ca    9.2      04-06  Mg     2.2     04-04  Phos  3.0     04-04    TPro  5.2<L>  /  Alb  3.2<L>  /  TBili  0.5  /  DBili  x   /  AST  91<H>  /  ALT  31  /  AlkPhos  54  04-04      03-24 Chol 198 LDL -- HDL 63 Trig 89    Thyroid Function Tests:  03-30 @ 03:59 TSH 5.37   03-23 @ 14:38 TSH 1.77         A1C with Estimated Average Glucose Result: 13.9 % (03-24-21 @ 00:08)  A1C with Estimated Average Glucose Result: 13.9 % (03-23-21 @ 15:35)      Estimated Average Glucose: 352 mg/dL (03-24-21 @ 00:08)  Estimated Average Glucose: 352 mg/dL (03-23-21 @ 15:35)

## 2021-04-06 NOTE — PROGRESS NOTE ADULT - PROBLEM SELECTOR PLAN 1
Goal glucose 100-180 mg/dl  FS AC & HS  Increase Lantus to 14 units SQ qHS   Increase Admelog 12 units SQ TID before meals (Hold if NPO/not eating meal)  Continue Admelog moderate dose correctional scale SQ before meals and moderate scale at bedtime  Consistent carbohydrate diet    Discharge Plan:  Semglee PEN is covered, basal/bolus vs basal/orals. Doses TBD  Send Rx for Semglee Dose TBD, Check coverage for Jardiance 10mg once daily; check coverage for Ozempic 0.25mg sq injection once weekly for 4 weeks   Ensure she has supplies, including glucometer, glucose test strips, lancets, alcohol swabs and insulin PEN needles   F/u with outpatient endocrine Dr. Mathews. Goal glucose 100-180 mg/dl  FS AC & HS  Increase Lantus to 14 units SQ qHS   Increase Admelog 10 units SQ TID before meals (Hold if NPO/not eating meal)  Continue Admelog moderate dose correctional scale SQ before meals and moderate scale at bedtime  Consistent carbohydrate diet    Discharge Plan:  Semglee PEN is covered, basal/bolus vs basal/orals. Doses TBD  Send Rx for Semglee Dose TBD, Check coverage for Jardiance 10mg once daily; check coverage for Ozempic 0.25mg sq injection once weekly for 4 weeks   Ensure she has supplies, including glucometer, glucose test strips, lancets, alcohol swabs and insulin PEN needles   F/u with outpatient endocrine Dr. Mathews.

## 2021-04-06 NOTE — PROGRESS NOTE ADULT - ASSESSMENT
50 year old woman with PMH HTN, HLD, uncontrolled DM2 with CAD, p/w worsening shortness of breath and chest pain for 1 week, admitted for management of acute CHF as well as hyperglycemia the 600's ,s/p CABG x3 on 4/2 with CTICU admission & insulin gtt post op, endocrine consult called for management of uncontrolled DM2. A1c is 13.9%. Improved PO intake,  ; BG elevated over past 24 hours will adjust insulin regiment  to achieve glycemic control with goal of -180mg/dl.     Discussed discharge planning with patient: Basal/bolus verus basal+GLP1+ SGLT discussed potential side effects such as persisent nausea/abdominal fullness/ increased risk of UTI & increased urination- patient agreeable to potential side effects ; denies personal or family history of thyroid cancer or pancreatitis

## 2021-04-06 NOTE — PROGRESS NOTE ADULT - PROBLEM SELECTOR PLAN 1
continue postop care  continue asa81 plavix statin and lopressor 25 changed to toprol xl 50 QD  left PTC observe -- output still high  lsx 20 ald 25 added  pain management  pulm toilet  increase activity as tolerated  Discharge planning- home with insulin as per endo

## 2021-04-06 NOTE — PROGRESS NOTE ADULT - ASSESSMENT
51y/o Smooth Female with pmhx of T2DM (diagnosed 2005) and Hypertension (diagnosed 2005) p/w worsening shortness of breath and chest pain for 1 wk on 3/23. Due to insurance issues, was not able to take home metformin. At [facility] ED, pt was found to be in DKA with glucose level in 600s, proBNP 2107, trop 111 with ST depression in V5/V6. Endo was consulted  Pt was given Lasix IV push and admitted to CCU on bipap and nitro gtt. TTE done on 3/24 showed EF 35-40%, moderate to severe LV systolic dysfunction, apical thrombus seen, and trace pericardial effusion, and b/l pleural effusions. Pt was underwent cath on 3/25. LHC/RHC 3/25 showed PCW 30, CO 3.94, CI 2.42, pLAD 80% stenosis, Mid LAD 60% stenosis, Mid circumflex 70%, RCA 75% stenosis, RPDA 70% stenosis, RPLS 60% stenosis at ostium.   Patient underwent cardiac viability study 3/29/21, results reviewed.  Patient was subsequently transferred to [facility] for Preop CABG evaluation with Dr. Baig. Cardiac surgery workup completed.    4/2  Underwent CABG 3 /LAABypass graft,post op course includes  fluid resuscitation inotropic support insulin gtt for  hyperglycemia  Extubate Michelle day  4/3VSS seen by PT dispotion to home  with PT dig loaded   4/4 VSS  deintensified  Right pigtail placed for pleural  effusion 555  Molina d/c voiding. transfered to floor losartan 25 per Dr Baig. ASA Plavix lopressor 25 bid atorvastatin  80 ISS  lantus 10  premeal 8   disposition to home   4/5 VSS; RSR 80--110; d/c right pigtail as per Dr. Baig this am; ck post removal chest xray; ck echo; change asa 81 qd  4/6 HD stable.  PTC 310cc/24 hrs--ambulate and observe thru day.  Lop changed to toprol. Lasix/aldactone added   Poss DC Wed

## 2021-04-06 NOTE — PROGRESS NOTE ADULT - SUBJECTIVE AND OBJECTIVE BOX
S:  doing well.  c/o pain b/l lower extremities    Telemetry:  SR     Vital Signs Last 24 Hrs  T(C): 36.7 (21 @ 05:21), Max: 37.3 (21 @ 19:30)  T(F): 98.1 (21 @ 05:21), Max: 99.1 (21 @ 19:30)  HR: 94 (21 @ 05:21) (91 - 116)  BP: 120/81 (21 @ 05:21) (102/69 - 133/83)  RR: 18 (21 @ 05:21) (18 - 18)  SpO2: 97% (21 @ 05:21) (93% - 97%)                    @ 07:01  -   @ 07:00  --------------------------------------------------------  IN: 810 mL / OUT: 1220 mL / NET: -410 mL     @ 07:01  -   @ 10:57  --------------------------------------------------------  IN: 220 mL / OUT: 260 mL / NET: -40 mL          Daily     Daily Weight in k.5 (2021 07:20)        CAPILLARY BLOOD GLUCOSE      POCT Blood Glucose.: 210 mg/dL (2021 07:38)  POCT Blood Glucose.: 219 mg/dL (2021 21:15)  POCT Blood Glucose.: 202 mg/dL (2021 16:27)  POCT Blood Glucose.: 122 mg/dL (2021 11:42)          Drains:     MS         [  ] Drainage:                 L Pleural  [ x ]  Drainage:    310cc/24 hrs no al            R Pleural  [  ]  Drainage:    Pacing Wires        [  ]   Settings:                                  Isolated  [  ]    Coumadin    [ ] YES          [ x ]      NO         Reason:                                 10.3   1185 )-----------( 259      ( 2021 10:08 )             33.2       04-06    134<L>  |  101  |  13  ----------------------------<  193<H>  4.4   |  22  |  0.56    Ca    9.2      2021 10:08            PHYSICAL EXAM:    Neurology: alert and oriented x 3, nonfocal, no gross deficits    CV :  S1S2 heard RRR    Sternal Wound :  CDI , Stable    Lungs:  clear to ausc.  No w/r/r    Abdomen: soft, nontender, nondistended, positive bowel sounds             Extremities:  no edema.  inc CDI             acetaminophen   Tablet .. 650 milliGRAM(s) Oral every 6 hours PRN  aspirin enteric coated 81 milliGRAM(s) Oral daily  atorvastatin 80 milliGRAM(s) Oral at bedtime  clopidogrel Tablet 75 milliGRAM(s) Oral daily  enoxaparin Injectable 40 milliGRAM(s) SubCutaneous daily  furosemide    Tablet 20 milliGRAM(s) Oral daily  insulin glargine Injectable (LANTUS) 12 Unit(s) SubCutaneous at bedtime  insulin lispro (ADMELOG) corrective regimen sliding scale   SubCutaneous three times a day before meals  insulin lispro (ADMELOG) corrective regimen sliding scale   SubCutaneous at bedtime  insulin lispro Injectable (ADMELOG) 6 Unit(s) SubCutaneous three times a day before meals  losartan 25 milliGRAM(s) Oral at bedtime  metoprolol succinate ER 50 milliGRAM(s) Oral daily  oxyCODONE    IR 5 milliGRAM(s) Oral every 4 hours PRN  pantoprazole    Tablet 40 milliGRAM(s) Oral before breakfast  polyethylene glycol 3350 17 Gram(s) Oral daily  sodium chloride 0.9% lock flush 3 milliLiter(s) IV Push every 8 hours  spironolactone 25 milliGRAM(s) Oral daily                              Physical Therapy Rec:   Home  [ x ]   Home w/ PT  [  ]  Rehab  [  ]    Discussed with Cardiothoracic Team at AM rounds.

## 2021-04-07 PROBLEM — Z00.00 ENCOUNTER FOR PREVENTIVE HEALTH EXAMINATION: Status: ACTIVE | Noted: 2021-04-07

## 2021-04-07 LAB
ANION GAP SERPL CALC-SCNC: 10 MMOL/L — SIGNIFICANT CHANGE UP (ref 5–17)
BUN SERPL-MCNC: 12 MG/DL — SIGNIFICANT CHANGE UP (ref 7–23)
CALCIUM SERPL-MCNC: 8.9 MG/DL — SIGNIFICANT CHANGE UP (ref 8.4–10.5)
CHLORIDE SERPL-SCNC: 105 MMOL/L — SIGNIFICANT CHANGE UP (ref 96–108)
CO2 SERPL-SCNC: 22 MMOL/L — SIGNIFICANT CHANGE UP (ref 22–31)
CREAT SERPL-MCNC: 0.51 MG/DL — SIGNIFICANT CHANGE UP (ref 0.5–1.3)
GLUCOSE BLDC GLUCOMTR-MCNC: 129 MG/DL — HIGH (ref 70–99)
GLUCOSE BLDC GLUCOMTR-MCNC: 145 MG/DL — HIGH (ref 70–99)
GLUCOSE BLDC GLUCOMTR-MCNC: 186 MG/DL — HIGH (ref 70–99)
GLUCOSE BLDC GLUCOMTR-MCNC: 77 MG/DL — SIGNIFICANT CHANGE UP (ref 70–99)
GLUCOSE SERPL-MCNC: 193 MG/DL — HIGH (ref 70–99)
HCT VFR BLD CALC: 29.9 % — LOW (ref 34.5–45)
HGB BLD-MCNC: 9.5 G/DL — LOW (ref 11.5–15.5)
MAGNESIUM SERPL-MCNC: 1.8 MG/DL — SIGNIFICANT CHANGE UP (ref 1.6–2.6)
MCHC RBC-ENTMCNC: 26.8 PG — LOW (ref 27–34)
MCHC RBC-ENTMCNC: 31.8 GM/DL — LOW (ref 32–36)
MCV RBC AUTO: 84.2 FL — SIGNIFICANT CHANGE UP (ref 80–100)
NRBC # BLD: 0 /100 WBCS — SIGNIFICANT CHANGE UP (ref 0–0)
PLATELET # BLD AUTO: 259 K/UL — SIGNIFICANT CHANGE UP (ref 150–400)
POTASSIUM SERPL-MCNC: 4.8 MMOL/L — SIGNIFICANT CHANGE UP (ref 3.5–5.3)
POTASSIUM SERPL-SCNC: 4.8 MMOL/L — SIGNIFICANT CHANGE UP (ref 3.5–5.3)
RBC # BLD: 3.55 M/UL — LOW (ref 3.8–5.2)
RBC # FLD: 13.8 % — SIGNIFICANT CHANGE UP (ref 10.3–14.5)
SODIUM SERPL-SCNC: 137 MMOL/L — SIGNIFICANT CHANGE UP (ref 135–145)
WBC # BLD: 9.13 K/UL — SIGNIFICANT CHANGE UP (ref 3.8–10.5)
WBC # FLD AUTO: 9.13 K/UL — SIGNIFICANT CHANGE UP (ref 3.8–10.5)

## 2021-04-07 PROCEDURE — 99232 SBSQ HOSP IP/OBS MODERATE 35: CPT

## 2021-04-07 PROCEDURE — 71045 X-RAY EXAM CHEST 1 VIEW: CPT | Mod: 26

## 2021-04-07 RX ORDER — MAGNESIUM SULFATE 500 MG/ML
2 VIAL (ML) INJECTION ONCE
Refills: 0 | Status: COMPLETED | OUTPATIENT
Start: 2021-04-07 | End: 2021-04-07

## 2021-04-07 RX ADMIN — Medication 650 MILLIGRAM(S): at 19:24

## 2021-04-07 RX ADMIN — OXYCODONE HYDROCHLORIDE 5 MILLIGRAM(S): 5 TABLET ORAL at 19:24

## 2021-04-07 RX ADMIN — ENOXAPARIN SODIUM 40 MILLIGRAM(S): 100 INJECTION SUBCUTANEOUS at 11:54

## 2021-04-07 RX ADMIN — Medication 650 MILLIGRAM(S): at 20:00

## 2021-04-07 RX ADMIN — SPIRONOLACTONE 25 MILLIGRAM(S): 25 TABLET, FILM COATED ORAL at 06:11

## 2021-04-07 RX ADMIN — OXYCODONE HYDROCHLORIDE 5 MILLIGRAM(S): 5 TABLET ORAL at 20:00

## 2021-04-07 RX ADMIN — INSULIN GLARGINE 14 UNIT(S): 100 INJECTION, SOLUTION SUBCUTANEOUS at 21:07

## 2021-04-07 RX ADMIN — Medication 81 MILLIGRAM(S): at 11:53

## 2021-04-07 RX ADMIN — CLOPIDOGREL BISULFATE 75 MILLIGRAM(S): 75 TABLET, FILM COATED ORAL at 11:53

## 2021-04-07 RX ADMIN — ATORVASTATIN CALCIUM 80 MILLIGRAM(S): 80 TABLET, FILM COATED ORAL at 21:02

## 2021-04-07 RX ADMIN — SODIUM CHLORIDE 3 MILLILITER(S): 9 INJECTION INTRAMUSCULAR; INTRAVENOUS; SUBCUTANEOUS at 13:22

## 2021-04-07 RX ADMIN — LOSARTAN POTASSIUM 25 MILLIGRAM(S): 100 TABLET, FILM COATED ORAL at 21:03

## 2021-04-07 RX ADMIN — Medication 20 MILLIGRAM(S): at 06:11

## 2021-04-07 RX ADMIN — Medication 10 UNIT(S): at 17:14

## 2021-04-07 RX ADMIN — SODIUM CHLORIDE 3 MILLILITER(S): 9 INJECTION INTRAMUSCULAR; INTRAVENOUS; SUBCUTANEOUS at 06:17

## 2021-04-07 RX ADMIN — POLYETHYLENE GLYCOL 3350 17 GRAM(S): 17 POWDER, FOR SOLUTION ORAL at 11:54

## 2021-04-07 RX ADMIN — Medication 50 GRAM(S): at 11:52

## 2021-04-07 RX ADMIN — Medication 10 UNIT(S): at 08:06

## 2021-04-07 RX ADMIN — Medication 10 UNIT(S): at 11:53

## 2021-04-07 RX ADMIN — SODIUM CHLORIDE 3 MILLILITER(S): 9 INJECTION INTRAMUSCULAR; INTRAVENOUS; SUBCUTANEOUS at 22:00

## 2021-04-07 RX ADMIN — PANTOPRAZOLE SODIUM 40 MILLIGRAM(S): 20 TABLET, DELAYED RELEASE ORAL at 06:11

## 2021-04-07 RX ADMIN — Medication 50 MILLIGRAM(S): at 06:11

## 2021-04-07 RX ADMIN — Medication 2: at 08:05

## 2021-04-07 NOTE — PROGRESS NOTE ADULT - ASSESSMENT
50 year old woman with PMH HTN, HLD, uncontrolled DM2 with CAD, p/w worsening shortness of breath and chest pain for 1 week, admitted for management of acute CHF as well as hyperglycemia the 600's ,s/p CABG x3 on 4/2 with CTICU admission & insulin gtt post op, endocrine consult called for management of uncontrolled DM2. A1c is 13.9%.    Discussed discharge planning with patient: Basal/bolus verus basal+GLP1+ SGLT discussed potential side effects such as persisent nausea/abdominal fullness/ increased risk of UTI & increased urination- patient agreeable to potential side effects ; denies personal or family history of thyroid cancer or pancreatitis

## 2021-04-07 NOTE — DIETITIAN INITIAL EVALUATION ADULT. - ADD RECOMMEND
Recommend continue current diet order to promote heart health and glycemic control. Endo following for medical management. Educated pt on glycemic control to promote heart health, discussed SMBG and goal FS ranges, consistent CHO vegetarian diet, medication adherence. Pt voiced understanding.

## 2021-04-07 NOTE — DIETITIAN INITIAL EVALUATION ADULT. - OTHER INFO
Pt reports good po intake/appetite at this time, denies GI distress at this time. Pt admits to unintentional wt loss over the past 2 years, was about 165 pounds x 2 years ago, now 123 pounds indicative of a 42 pound wt loss over 2 years. In terms of DM management PTA, pt admits she did not take medication prior to admission. She reports trying to manage her blood sugar via diet, states she does not eat sweets, does not consume much rice or bread.

## 2021-04-07 NOTE — PROGRESS NOTE ADULT - ASSESSMENT
51y/o Smooth Female with pmhx of T2DM (diagnosed 2005) and Hypertension (diagnosed 2005) p/w worsening shortness of breath and chest pain for 1 wk on 3/23. Due to insurance issues, was not able to take home metformin. At [facility] ED, pt was found to be in DKA with glucose level in 600s, proBNP 2107, trop 111 with ST depression in V5/V6. Endo was consulted  Pt was given Lasix IV push and admitted to CCU on bipap and nitro gtt. TTE done on 3/24 showed EF 35-40%, moderate to severe LV systolic dysfunction, apical thrombus seen, and trace pericardial effusion, and b/l pleural effusions. Pt was underwent cath on 3/25. LHC/RHC 3/25 showed PCW 30, CO 3.94, CI 2.42, pLAD 80% stenosis, Mid LAD 60% stenosis, Mid circumflex 70%, RCA 75% stenosis, RPDA 70% stenosis, RPLS 60% stenosis at ostium.   Patient underwent cardiac viability study 3/29/21, results reviewed.  Patient was subsequently transferred to [facility] for Preop CABG evaluation with Dr. Baig. Cardiac surgery workup completed.    4/2  Underwent CABG 3 /LAABypass graft,post op course includes  fluid resuscitation inotropic support insulin gtt for  hyperglycemia  Extubate Michelle day  4/3VSS seen by PT dispotion to home  with PT dig loaded   4/4 VSS  deintensified  Right pigtail placed for pleural  effusion 555  Molina d/c voiding. transfered to floor losartan 25 per Dr Baig. ASA Plavix lopressor 25 bid atorvastatin  80 ISS  lantus 10  premeal 8   disposition to home   4/5 VSS; RSR 80--110; d/c right pigtail as per Dr. Baig this am; ck post removal chest xray; ck echo; change asa 81 qd  4/6 HD stable.  PTC 310cc/24 hrs--ambulate and observe thru day.  Lop changed to toprol. Lasix/aldactone added   Poss DC Wed 51y/o Smooth Female with pmhx of T2DM (diagnosed 2005) and Hypertension (diagnosed 2005) p/w worsening shortness of breath and chest pain for 1 wk on 3/23. Due to insurance issues, was not able to take home metformin. At [facility] ED, pt was found to be in DKA with glucose level in 600s, proBNP 2107, trop 111 with ST depression in V5/V6. Endo was consulted  Pt was given Lasix IV push and admitted to CCU on bipap and nitro gtt. TTE done on 3/24 showed EF 35-40%, moderate to severe LV systolic dysfunction, apical thrombus seen, and trace pericardial effusion, and b/l pleural effusions. Pt was underwent cath on 3/25. LHC/RHC 3/25 showed PCW 30, CO 3.94, CI 2.42, pLAD 80% stenosis, Mid LAD 60% stenosis, Mid circumflex 70%, RCA 75% stenosis, RPDA 70% stenosis, RPLS 60% stenosis at ostium.   Patient underwent cardiac viability study 3/29/21, results reviewed.  Patient was subsequently transferred to [facility] for Preop CABG evaluation with Dr. Baig. Cardiac surgery workup completed.  4/2  Underwent CABG 3 /LEANA Bypass graft,post op course includes  fluid resuscitation inotropic support insulin gtt for  hyperglycemia  Extubated OR day  4/3VSS seen by PT disposition to home  with PT dig loaded   4/4 VSS  deintensified  Right pigtail placed for pleural  effusion 555  Molina d/c voiding. transferred to floor losartan 25 per Dr Baig. ASA Plavix lopressor 25 bid atorvastatin  80 ISS  lantus 10  premeal 8   disposition to home   4/5 VSS; RSR 80--110; d/c right pigtail as per Dr. Baig this am; ck post removal chest xray; ck echo; change asa 81 qd  4/6 HD stable.  PTC 310cc/24 hrs--ambulate and observe thru day.  Lop changed to toprol. Lasix/aldactone added.  4/7 VSS, mag 1.8 supplemented. Left chest tube 230cc/24h, another 140cc this AM after ambulation. Possible d/c CT later today and home in AM.  51y/o Smooth Female with pmhx of T2DM (diagnosed 2005) and Hypertension (diagnosed 2005) p/w worsening shortness of breath and chest pain for 1 wk on 3/23. Due to insurance issues, was not able to take home metformin. At [facility] ED, pt was found to be in DKA with glucose level in 600s, proBNP 2107, trop 111 with ST depression in V5/V6. Endo was consulted  Pt was given Lasix IV push and admitted to CCU on bipap and nitro gtt. TTE done on 3/24 showed EF 35-40%, moderate to severe LV systolic dysfunction, apical thrombus seen, and trace pericardial effusion, and b/l pleural effusions. Pt was underwent cath on 3/25. LHC/RHC 3/25 showed PCW 30, CO 3.94, CI 2.42, pLAD 80% stenosis, Mid LAD 60% stenosis, Mid circumflex 70%, RCA 75% stenosis, RPDA 70% stenosis, RPLS 60% stenosis at ostium.   Patient underwent cardiac viability study 3/29/21, results reviewed.  Patient was subsequently transferred to [facility] for Preop CABG evaluation with Dr. Baig. Cardiac surgery workup completed.  4/2  Underwent CABG 3 /LEANA Bypass graft,post op course includes  fluid resuscitation inotropic support insulin gtt for  hyperglycemia  Extubated OR day  4/3VSS seen by PT disposition to home  with PT dig loaded   4/4 VSS  deintensified  Right pigtail placed for pleural  effusion 555  Molina d/c voiding. transferred to floor losartan 25 per Dr Baig. ASA Plavix lopressor 25 bid atorvastatin  80 ISS  lantus 10  premeal 8   disposition to home   4/5 VSS; RSR 80--110; d/c right pigtail as per Dr. Baig this am; ck post removal chest xray; ck echo; change asa 81 qd  4/6 HD stable.  PTC 310cc/24 hrs--ambulate and observe thru day.  Lop changed to toprol. Lasix/aldactone added.  4/7 VSS, mag 1.8 supplemented. Left chest tube 230cc/24h, another 140cc this AM after ambulation. Keep left chest tube in today and monitor drainage, possible d/c in AM.

## 2021-04-07 NOTE — PROGRESS NOTE ADULT - SUBJECTIVE AND OBJECTIVE BOX
Chief Complaint: DM2    History: Patient is tolerating diet. Predinner FS was tightly controlled.     MEDICATIONS  (STANDING):  aspirin enteric coated 81 milliGRAM(s) Oral daily  atorvastatin 80 milliGRAM(s) Oral at bedtime  clopidogrel Tablet 75 milliGRAM(s) Oral daily  enoxaparin Injectable 40 milliGRAM(s) SubCutaneous daily  furosemide    Tablet 20 milliGRAM(s) Oral daily  insulin glargine Injectable (LANTUS) 14 Unit(s) SubCutaneous at bedtime  insulin lispro (ADMELOG) corrective regimen sliding scale   SubCutaneous three times a day before meals  insulin lispro (ADMELOG) corrective regimen sliding scale   SubCutaneous at bedtime  insulin lispro Injectable (ADMELOG) 10 Unit(s) SubCutaneous three times a day before meals  losartan 25 milliGRAM(s) Oral at bedtime  metoprolol succinate ER 50 milliGRAM(s) Oral daily  pantoprazole    Tablet 40 milliGRAM(s) Oral before breakfast  polyethylene glycol 3350 17 Gram(s) Oral daily  sodium chloride 0.9% lock flush 3 milliLiter(s) IV Push every 8 hours  spironolactone 25 milliGRAM(s) Oral daily    MEDICATIONS  (PRN):  acetaminophen   Tablet .. 650 milliGRAM(s) Oral every 6 hours PRN Mild Pain (1 - 3)  oxyCODONE    IR 5 milliGRAM(s) Oral every 4 hours PRN Moderate Pain (4 - 6)      Allergies    No Known Allergies    Intolerances      Review of Systems:  Constitutional: No fever  Eyes: No blurry vision  Neuro: No tremors  HEENT: No pain  Cardiovascular: No chest pain, palpitations  Respiratory: No SOB, no cough  GI: No nausea, vomiting, abdominal pain  : No dysuria  Skin: no rash  Psych: no depression  Endocrine: no polyuria, polydipsia      ALL OTHER SYSTEMS REVIEWED AND NEGATIVE        PHYSICAL EXAM:  VITALS: T(C): 37.1 (04-07-21 @ 11:46)  T(F): 98.7 (04-07-21 @ 11:46), Max: 98.8 (04-07-21 @ 08:20)  HR: 97 (04-07-21 @ 11:46) (88 - 97)  BP: 122/78 (04-07-21 @ 11:46) (111/74 - 129/82)  RR:  (18 - 18)  SpO2:  (96% - 98%)  Wt(kg): --  GENERAL: NAD, well-groomed, well-developed  EYES: No proptosis, no lid lag, anicteric  HEENT:  Atraumatic, Normocephalic, moist mucous membranes  RESPIRATORY: Clear to auscultation bilaterally  CARDIOVASCULAR: Regular rate and rhythm  GI: Soft, nontender, non distended, normal bowel sounds  SKIN: Dry, intact, No rashes or lesions      POCT Blood Glucose.: 77 mg/dL (04-07-21 @ 16:38)  POCT Blood Glucose.: 145 mg/dL (04-07-21 @ 11:30)  POCT Blood Glucose.: 186 mg/dL (04-07-21 @ 07:31)  POCT Blood Glucose.: 190 mg/dL (04-06-21 @ 21:49)  POCT Blood Glucose.: 196 mg/dL (04-06-21 @ 16:43)  POCT Blood Glucose.: 197 mg/dL (04-06-21 @ 11:12)  POCT Blood Glucose.: 210 mg/dL (04-06-21 @ 07:38)  POCT Blood Glucose.: 219 mg/dL (04-05-21 @ 21:15)  POCT Blood Glucose.: 202 mg/dL (04-05-21 @ 16:27)  POCT Blood Glucose.: 122 mg/dL (04-05-21 @ 11:42)  POCT Blood Glucose.: 173 mg/dL (04-05-21 @ 07:53)  POCT Blood Glucose.: 155 mg/dL (04-04-21 @ 21:21)      04-07    137  |  105  |  12  ----------------------------<  193<H>  4.8   |  22  |  0.51    EGFR if : 131  EGFR if non : 113    Ca    8.9      04-07  Mg     1.8     04-07  Phos  1.4     04-06            Thyroid Function Tests:  03-30 @ 03:59 TSH 5.37 FreeT4 -- T3 -- Anti TPO -- Anti Thyroglobulin Ab -- TSI --  03-23 @ 14:38 TSH 1.77 FreeT4 -- T3 -- Anti TPO -- Anti Thyroglobulin Ab -- TSI --

## 2021-04-07 NOTE — DIETITIAN INITIAL EVALUATION ADULT. - PROBLEM SELECTOR PLAN 1
New onset acute heart systolic failure in the setting of CAD  proBNP 2170  TTE 3/24: EF 35-40%, normal mitral valve, minimal MR, normal trileaflet aortic valve, moderate to severe LV systolic dysfunction, apical thrombus seen, normal right ventricular size and function, normal pericardium and trace pericardial effusion, b/l pleural effusions  Cath done 3/25 severe multiple vessel disease  Poor targets- will evaluate as per CTS surgeon Dr. Baig   3/29 - Cardiac Viability - partial viability- will review imaging   Continue metoprolol tartrate 25 mg bid, aspirin 81mg, statin 20 mg qhs  Losartan 25 mg qd- Held for renal optimization   Continue diuresis with torsemide 20 QD  Will continue daily weight, strict I/O's, DASH diet  Replete lytes

## 2021-04-07 NOTE — DIETITIAN INITIAL EVALUATION ADULT. - CHIEF COMPLAINT
Pt is a 50 year old female with PMH T2DM (diagnosed 2005) and Hypertension (diagnosed 2005) p/w worsening shortness of breath and chest pain for 1 wk on 3/23. Due to insurance issues, was not able to take home Metformin. At [facility] ED, pt was found to be in DKA with glucose level in 600s. Pt was underwent cath on 3/25 revealed CAD. Is now s/p 4/2 CABG x 3 /LAABypass graft. Skin: No pressure injuries noted.

## 2021-04-07 NOTE — PROGRESS NOTE ADULT - SUBJECTIVE AND OBJECTIVE BOX
Subjective: Pt states "Hello" denies any CP or SOB. No acute events overnight.     Telemetry:  SR 87  Vital Signs Last 24 Hrs  T(C): 37.1 (21 @ 08:20), Max: 37.1 (21 @ 13:00)  T(F): 98.8 (21 @ 08:20), Max: 98.8 (21 @ 13:00)  HR: 89 (21 @ 08:20) (88 - 128)  BP: 129/82 (21 @ 08:20) (111/74 - 129/82)  RR: 18 (21 @ 08:20) (18 - 18)  SpO2: 98% (21 @ 08:20) (96% - 98%)              @ 07:01  -   @ 07:00  --------------------------------------------------------  IN: 900 mL / OUT: 1130 mL / NET: -230 mL        Daily Weight in k.1 (2021 07:20)                        9.5    9.13  )-----------( 259      ( 2021 05:02 )             29.9     137  |  105  |  12  ----------------------------<  193<H>  4.8   |  22  |  0.51    Mg     1.8               CAPILLARY BLOOD GLUCOSE  186 - 190            PHYSICAL EXAM  Neurology: A&Ox3, NAD  CV : RRR+S1S2  Sternal Wound: MSI CDI ODESSA, Stable  Lungs: Respirations non-labored, B/L BS clear, diminished at bases  +Left pleural chest tube to LWS with serosanguinous drainage, no air leak  Abdomen: Soft, NT/ND, +BSx4Q, last BM  (-)N/V/D  : Voiding without difficulty  Extremities: B/L LE trace edema, negative calf tenderness, +PP, B/L SVG incision CDI ODESSA            MEDICATIONS  acetaminophen   Tablet .. 650 milliGRAM(s) Oral every 6 hours PRN  aspirin enteric coated 81 milliGRAM(s) Oral daily  atorvastatin 80 milliGRAM(s) Oral at bedtime  clopidogrel Tablet 75 milliGRAM(s) Oral daily  enoxaparin Injectable 40 milliGRAM(s) SubCutaneous daily  furosemide    Tablet 20 milliGRAM(s) Oral daily  insulin glargine Injectable (LANTUS) 14 Unit(s) SubCutaneous at bedtime  insulin lispro (ADMELOG) corrective regimen sliding scale   SubCutaneous three times a day before meals  insulin lispro (ADMELOG) corrective regimen sliding scale   SubCutaneous at bedtime  insulin lispro Injectable (ADMELOG) 10 Unit(s) SubCutaneous three times a day before meals  losartan 25 milliGRAM(s) Oral at bedtime  magnesium sulfate  IVPB 2 Gram(s) IV Intermittent once  metoprolol succinate ER 50 milliGRAM(s) Oral daily  oxyCODONE    IR 5 milliGRAM(s) Oral every 4 hours PRN  pantoprazole    Tablet 40 milliGRAM(s) Oral before breakfast  polyethylene glycol 3350 17 Gram(s) Oral daily  sodium chloride 0.9% lock flush 3 milliLiter(s) IV Push every 8 hours  spironolactone 25 milliGRAM(s) Oral daily      Physical Therapy Rec:   Home  [ X ]   Home w/ PT  [  ]  Rehab  [  ]    Discussed with Cardiothoracic Team at AM rounds. Subjective: Pt states "Hello" denies any CP or SOB. No acute events overnight.     Telemetry:  SR 87  Vital Signs Last 24 Hrs  T(C): 37.1 (21 @ 08:20), Max: 37.1 (21 @ 13:00)  T(F): 98.8 (21 @ 08:20), Max: 98.8 (21 @ 13:00)  HR: 89 (21 @ 08:20) (88 - 128)  BP: 129/82 (21 @ 08:20) (111/74 - 129/82)  RR: 18 (21 @ 08:20) (18 - 18)  SpO2: 98% (21 @ 08:20) (96% - 98%)              @ 07:01  -   @ 07:00  --------------------------------------------------------  IN: 900 mL / OUT: 1130 mL / NET: -230 mL        Daily Weight in k.1 (2021 07:20)                        9.5    9.13  )-----------( 259      ( 2021 05:02 )             29.9     137  |  105  |  12  ----------------------------<  193<H>  4.8   |  22  |  0.51    Mg     1.8               CAPILLARY BLOOD GLUCOSE  186 - 190            PHYSICAL EXAM  Neurology: A&Ox3, NAD  CV : RRR+S1S2  Sternal Wound: MSI CDI ODESSA, Stable  Lungs: Respirations non-labored, B/L BS clear, diminished at bases  +Left pleural chest tube to LWS with serosanguinous drainage, no air leak  Abdomen: Soft, NT/ND, +BSx4Q, last BM  (-)N/V/D  : Voiding without difficulty  Extremities: B/L LE trace edema, negative calf tenderness, +PP, B/L SVG incision CDI            MEDICATIONS  acetaminophen   Tablet .. 650 milliGRAM(s) Oral every 6 hours PRN  aspirin enteric coated 81 milliGRAM(s) Oral daily  atorvastatin 80 milliGRAM(s) Oral at bedtime  clopidogrel Tablet 75 milliGRAM(s) Oral daily  enoxaparin Injectable 40 milliGRAM(s) SubCutaneous daily  furosemide    Tablet 20 milliGRAM(s) Oral daily  insulin glargine Injectable (LANTUS) 14 Unit(s) SubCutaneous at bedtime  insulin lispro (ADMELOG) corrective regimen sliding scale   SubCutaneous three times a day before meals  insulin lispro (ADMELOG) corrective regimen sliding scale   SubCutaneous at bedtime  insulin lispro Injectable (ADMELOG) 10 Unit(s) SubCutaneous three times a day before meals  losartan 25 milliGRAM(s) Oral at bedtime  magnesium sulfate  IVPB 2 Gram(s) IV Intermittent once  metoprolol succinate ER 50 milliGRAM(s) Oral daily  oxyCODONE    IR 5 milliGRAM(s) Oral every 4 hours PRN  pantoprazole    Tablet 40 milliGRAM(s) Oral before breakfast  polyethylene glycol 3350 17 Gram(s) Oral daily  sodium chloride 0.9% lock flush 3 milliLiter(s) IV Push every 8 hours  spironolactone 25 milliGRAM(s) Oral daily      Physical Therapy Rec:   Home  [ X ]   Home w/ PT  [  ]  Rehab  [  ]    Discussed with Cardiothoracic Team at AM rounds.

## 2021-04-07 NOTE — PROGRESS NOTE ADULT - PROBLEM SELECTOR PLAN 1
continue postop care  continue asa81 plavix statin and lopressor 25 changed to toprol xl 50 QD  left PTC observe -- output still high  lsx 20 ald 25 added  pain management  pulm toilet  increase activity as tolerated  Discharge planning- home with insulin as per endo continue postop care  continue asa81 daily, plavix 75 daily, atorvastatin 80 HS and Toprol XL 50 QD  left PTC observe -- output still high  Continue diuresis on lasix 20mg po daily and aldactone 25mg daily  Daily weight, strict I & Os  Cough and deep breathe, Incentive Spirometry Q1h, Chest PT.  Ambulate 4x daily as tolerated and with PT.   Discharge planning- home

## 2021-04-07 NOTE — DIETITIAN INITIAL EVALUATION ADULT. - PERTINENT LABORATORY DATA
Glucose: 193.  CAPILLARY BLOOD GLUCOSE  POCT Blood Glucose.: 145 mg/dL (07 Apr 2021 11:30)  POCT Blood Glucose.: 186 mg/dL (07 Apr 2021 07:31)  POCT Blood Glucose.: 190 mg/dL (06 Apr 2021 21:49)  POCT Blood Glucose.: 196 mg/dL (06 Apr 2021 16:43)  HbA1c: 13.9%

## 2021-04-07 NOTE — PROGRESS NOTE ADULT - PROBLEM SELECTOR PLAN 1
Goal glucose 100-180 mg/dl  FS AC & HS  c/w Lantus  14 units SQ qHS   Decrease Admelog 8 units SQ TID before meals (Hold if NPO/not eating meal)  Continue Admelog moderate dose correctional scale SQ before meals and moderate scale at bedtime  Consistent carbohydrate diet    Discharge Plan:  Semglee PEN is covered, basal/bolus vs basal/orals. Doses TBD  Send Rx for Semglee Dose TBD, Check coverage for Jardiance 10mg once daily; check coverage for Ozempic 0.25mg sq injection once weekly for 4 weeks   Ensure she has supplies, including glucometer, glucose test strips, lancets, alcohol swabs and insulin PEN needles   F/u with outpatient endocrine Dr. Mathews.

## 2021-04-07 NOTE — PROGRESS NOTE ADULT - PROBLEM SELECTOR PLAN 2
endo following  diabetic diet  accuchecks ac and hs  increase lantus and admelog as per endo House end[facility] following  diabetic diet  accuchecks ac and hs  Continue Lantus 14 HS and admelog 10 TID pre-meals   Home insulin regimen TBD by endocrine   F/u with outpatient endocrine Dr. Mathews

## 2021-04-08 LAB
ALBUMIN SERPL ELPH-MCNC: 2.3 G/DL — LOW (ref 3.3–5)
ALP SERPL-CCNC: 54 U/L — SIGNIFICANT CHANGE UP (ref 40–120)
ALT FLD-CCNC: 11 U/L — SIGNIFICANT CHANGE UP (ref 10–45)
ANION GAP SERPL CALC-SCNC: 10 MMOL/L — SIGNIFICANT CHANGE UP (ref 5–17)
AST SERPL-CCNC: 11 U/L — SIGNIFICANT CHANGE UP (ref 10–40)
BASOPHILS # BLD AUTO: 0.06 K/UL — SIGNIFICANT CHANGE UP (ref 0–0.2)
BASOPHILS NFR BLD AUTO: 0.8 % — SIGNIFICANT CHANGE UP (ref 0–2)
BILIRUB SERPL-MCNC: 0.4 MG/DL — SIGNIFICANT CHANGE UP (ref 0.2–1.2)
BUN SERPL-MCNC: 10 MG/DL — SIGNIFICANT CHANGE UP (ref 7–23)
CALCIUM SERPL-MCNC: 8.2 MG/DL — LOW (ref 8.4–10.5)
CHLORIDE SERPL-SCNC: 106 MMOL/L — SIGNIFICANT CHANGE UP (ref 96–108)
CO2 SERPL-SCNC: 22 MMOL/L — SIGNIFICANT CHANGE UP (ref 22–31)
CREAT SERPL-MCNC: 0.54 MG/DL — SIGNIFICANT CHANGE UP (ref 0.5–1.3)
EOSINOPHIL # BLD AUTO: 0.28 K/UL — SIGNIFICANT CHANGE UP (ref 0–0.5)
EOSINOPHIL NFR BLD AUTO: 3.5 % — SIGNIFICANT CHANGE UP (ref 0–6)
GLUCOSE BLDC GLUCOMTR-MCNC: 101 MG/DL — HIGH (ref 70–99)
GLUCOSE BLDC GLUCOMTR-MCNC: 124 MG/DL — HIGH (ref 70–99)
GLUCOSE BLDC GLUCOMTR-MCNC: 189 MG/DL — HIGH (ref 70–99)
GLUCOSE BLDC GLUCOMTR-MCNC: 190 MG/DL — HIGH (ref 70–99)
GLUCOSE SERPL-MCNC: 105 MG/DL — HIGH (ref 70–99)
HCT VFR BLD CALC: 28.6 % — LOW (ref 34.5–45)
HGB BLD-MCNC: 9.1 G/DL — LOW (ref 11.5–15.5)
IMM GRANULOCYTES NFR BLD AUTO: 0.4 % — SIGNIFICANT CHANGE UP (ref 0–1.5)
LYMPHOCYTES # BLD AUTO: 2.71 K/UL — SIGNIFICANT CHANGE UP (ref 1–3.3)
LYMPHOCYTES # BLD AUTO: 34 % — SIGNIFICANT CHANGE UP (ref 13–44)
MAGNESIUM SERPL-MCNC: 2 MG/DL — SIGNIFICANT CHANGE UP (ref 1.6–2.6)
MCHC RBC-ENTMCNC: 26.8 PG — LOW (ref 27–34)
MCHC RBC-ENTMCNC: 31.8 GM/DL — LOW (ref 32–36)
MCV RBC AUTO: 84.4 FL — SIGNIFICANT CHANGE UP (ref 80–100)
MONOCYTES # BLD AUTO: 1.13 K/UL — HIGH (ref 0–0.9)
MONOCYTES NFR BLD AUTO: 14.2 % — HIGH (ref 2–14)
NEUTROPHILS # BLD AUTO: 3.77 K/UL — SIGNIFICANT CHANGE UP (ref 1.8–7.4)
NEUTROPHILS NFR BLD AUTO: 47.1 % — SIGNIFICANT CHANGE UP (ref 43–77)
NRBC # BLD: 0 /100 WBCS — SIGNIFICANT CHANGE UP (ref 0–0)
PLATELET # BLD AUTO: 285 K/UL — SIGNIFICANT CHANGE UP (ref 150–400)
POTASSIUM SERPL-MCNC: 4 MMOL/L — SIGNIFICANT CHANGE UP (ref 3.5–5.3)
POTASSIUM SERPL-SCNC: 4 MMOL/L — SIGNIFICANT CHANGE UP (ref 3.5–5.3)
PROT SERPL-MCNC: 5 G/DL — LOW (ref 6–8.3)
RBC # BLD: 3.39 M/UL — LOW (ref 3.8–5.2)
RBC # FLD: 13.8 % — SIGNIFICANT CHANGE UP (ref 10.3–14.5)
SODIUM SERPL-SCNC: 138 MMOL/L — SIGNIFICANT CHANGE UP (ref 135–145)
WBC # BLD: 7.98 K/UL — SIGNIFICANT CHANGE UP (ref 3.8–10.5)
WBC # FLD AUTO: 7.98 K/UL — SIGNIFICANT CHANGE UP (ref 3.8–10.5)

## 2021-04-08 PROCEDURE — 99232 SBSQ HOSP IP/OBS MODERATE 35: CPT

## 2021-04-08 RX ORDER — INSULIN LISPRO 100/ML
VIAL (ML) SUBCUTANEOUS
Refills: 0 | Status: DISCONTINUED | OUTPATIENT
Start: 2021-04-08 | End: 2021-04-09

## 2021-04-08 RX ORDER — INSULIN GLARGINE 100 [IU]/ML
14 INJECTION, SOLUTION SUBCUTANEOUS
Qty: 420 | Refills: 0
Start: 2021-04-08 | End: 2021-05-07

## 2021-04-08 RX ORDER — EMPAGLIFLOZIN 10 MG/1
1 TABLET, FILM COATED ORAL
Qty: 30 | Refills: 0
Start: 2021-04-08 | End: 2021-05-07

## 2021-04-08 RX ORDER — INSULIN LISPRO 100/ML
8 VIAL (ML) SUBCUTANEOUS
Qty: 720 | Refills: 0
Start: 2021-04-08 | End: 2021-05-07

## 2021-04-08 RX ORDER — ISOPROPYL ALCOHOL, BENZOCAINE .7; .06 ML/ML; ML/ML
1 SWAB TOPICAL
Qty: 100 | Refills: 1
Start: 2021-04-08 | End: 2021-05-27

## 2021-04-08 RX ORDER — INSULIN LISPRO 100/ML
8 VIAL (ML) SUBCUTANEOUS
Refills: 0 | Status: DISCONTINUED | OUTPATIENT
Start: 2021-04-08 | End: 2021-04-09

## 2021-04-08 RX ORDER — ENOXAPARIN SODIUM 100 MG/ML
14 INJECTION SUBCUTANEOUS
Qty: 420 | Refills: 0
Start: 2021-04-08 | End: 2021-05-07

## 2021-04-08 RX ORDER — SEMAGLUTIDE 0.68 MG/ML
0.25 INJECTION, SOLUTION SUBCUTANEOUS
Qty: 1.07 | Refills: 0
Start: 2021-04-08 | End: 2021-05-07

## 2021-04-08 RX ORDER — INSULIN LISPRO 100/ML
VIAL (ML) SUBCUTANEOUS AT BEDTIME
Refills: 0 | Status: DISCONTINUED | OUTPATIENT
Start: 2021-04-08 | End: 2021-04-09

## 2021-04-08 RX ADMIN — Medication 650 MILLIGRAM(S): at 21:54

## 2021-04-08 RX ADMIN — Medication 20 MILLIGRAM(S): at 05:03

## 2021-04-08 RX ADMIN — Medication 1: at 16:59

## 2021-04-08 RX ADMIN — SODIUM CHLORIDE 3 MILLILITER(S): 9 INJECTION INTRAMUSCULAR; INTRAVENOUS; SUBCUTANEOUS at 05:43

## 2021-04-08 RX ADMIN — Medication 650 MILLIGRAM(S): at 22:15

## 2021-04-08 RX ADMIN — Medication 81 MILLIGRAM(S): at 11:44

## 2021-04-08 RX ADMIN — Medication 8 UNIT(S): at 16:59

## 2021-04-08 RX ADMIN — CLOPIDOGREL BISULFATE 75 MILLIGRAM(S): 75 TABLET, FILM COATED ORAL at 11:43

## 2021-04-08 RX ADMIN — Medication 650 MILLIGRAM(S): at 11:43

## 2021-04-08 RX ADMIN — SODIUM CHLORIDE 3 MILLILITER(S): 9 INJECTION INTRAMUSCULAR; INTRAVENOUS; SUBCUTANEOUS at 21:06

## 2021-04-08 RX ADMIN — PANTOPRAZOLE SODIUM 40 MILLIGRAM(S): 20 TABLET, DELAYED RELEASE ORAL at 05:03

## 2021-04-08 RX ADMIN — Medication 10 UNIT(S): at 08:23

## 2021-04-08 RX ADMIN — Medication 50 MILLIGRAM(S): at 05:03

## 2021-04-08 RX ADMIN — ENOXAPARIN SODIUM 40 MILLIGRAM(S): 100 INJECTION SUBCUTANEOUS at 11:44

## 2021-04-08 RX ADMIN — OXYCODONE HYDROCHLORIDE 5 MILLIGRAM(S): 5 TABLET ORAL at 17:50

## 2021-04-08 RX ADMIN — Medication 650 MILLIGRAM(S): at 15:22

## 2021-04-08 RX ADMIN — OXYCODONE HYDROCHLORIDE 5 MILLIGRAM(S): 5 TABLET ORAL at 11:43

## 2021-04-08 RX ADMIN — OXYCODONE HYDROCHLORIDE 5 MILLIGRAM(S): 5 TABLET ORAL at 15:22

## 2021-04-08 RX ADMIN — LOSARTAN POTASSIUM 25 MILLIGRAM(S): 100 TABLET, FILM COATED ORAL at 21:54

## 2021-04-08 RX ADMIN — INSULIN GLARGINE 14 UNIT(S): 100 INJECTION, SOLUTION SUBCUTANEOUS at 21:56

## 2021-04-08 RX ADMIN — SPIRONOLACTONE 25 MILLIGRAM(S): 25 TABLET, FILM COATED ORAL at 05:04

## 2021-04-08 RX ADMIN — OXYCODONE HYDROCHLORIDE 5 MILLIGRAM(S): 5 TABLET ORAL at 21:54

## 2021-04-08 RX ADMIN — SODIUM CHLORIDE 3 MILLILITER(S): 9 INJECTION INTRAMUSCULAR; INTRAVENOUS; SUBCUTANEOUS at 15:22

## 2021-04-08 RX ADMIN — OXYCODONE HYDROCHLORIDE 5 MILLIGRAM(S): 5 TABLET ORAL at 16:58

## 2021-04-08 RX ADMIN — OXYCODONE HYDROCHLORIDE 5 MILLIGRAM(S): 5 TABLET ORAL at 22:15

## 2021-04-08 RX ADMIN — ATORVASTATIN CALCIUM 80 MILLIGRAM(S): 80 TABLET, FILM COATED ORAL at 21:54

## 2021-04-08 RX ADMIN — Medication 8 UNIT(S): at 11:49

## 2021-04-08 NOTE — PROGRESS NOTE ADULT - PROBLEM SELECTOR PLAN 1
continue postop care  continue asa81 daily, plavix 75 daily, atorvastatin 80 HS and Toprol XL 50 QD  left chest tube to LWS -- output still high  Continue diuresis on lasix 20mg po daily and aldactone 25mg daily  Daily weight, strict I & Os  Cough and deep breathe, Incentive Spirometry Q1h, Chest PT.  Ambulate 4x daily as tolerated and with PT.   Discharge planning- home continue postop care  continue asa81 daily, plavix 75 daily, atorvastatin 80 HS and Toprol XL 50 QD  left chest tube to water seal -- output still high, possible d/c in AM  Continue diuresis on lasix 20mg po daily and aldactone 25mg daily  Daily weight, strict I & Os  Cough and deep breathe, Incentive Spirometry Q1h, Chest PT.  Ambulate 4x daily as tolerated and with PT.   Discharge planning- home

## 2021-04-08 NOTE — PROGRESS NOTE ADULT - PROBLEM SELECTOR PLAN 2
House endo following  diabetic diet  accuchecks ac and hs  Continue Lantus 14 HS and admelog 8 TID pre-meals   Home insulin regimen TBD by endocrine   F/u with outpatient endocrine Dr. Mathews House endo following  diabetic diet  accuchecks ac and hs  Continue Lantus 14 HS and admelog 8 TID pre-meals   D/C home on Semglee 14 units HS and Admelog 8 units TID per endocrine - meds sent to [facility]  F/u with outpatient endocrine Dr. Mathews

## 2021-04-08 NOTE — PROGRESS NOTE ADULT - PROBLEM SELECTOR PLAN 1
Goal glucose 100-180 mg/dl  FS AC & HS  c/w Lantus  14 units SQ qHS   Decreased Admelog 8 units SQ TID before meals (Hold if NPO/not eating meal)  Decreased Admelog correctional scale to low dose SQ before meals and low dose scale at bedtime  Consistent carbohydrate diet    Discharge Plan:  Semglee (covered)  and Admelog ($3 copay) Doses TBD on day of discharge  Ensure she has supplies, including glucometer, glucose test strips, lancets, alcohol swabs and insulin PEN needles   Endo clinic emailed for appt Goal glucose 100-180 mg/dl  FS AC & HS  c/w Lantus  14 units SQ qHS   Decreased Admelog 8 units SQ TID before meals (Hold if NPO/not eating meal)  Decreased Admelog correctional scale to low dose SQ before meals and low dose scale at bedtime  Consistent carbohydrate diet    Discharge Plan:  Semglee (covered)  and Admelog ($3 copay) Doses TBD on day of discharge  Ensure she has supplies, including glucometer, glucose test strips, lancets, alcohol swabs and insulin PEN needles   Endo office emailed for appt Goal glucose 100-180 mg/dl  FS AC & HS  c/w Lantus  14 units SQ qHS   Decreased Admelog 8 units SQ TID before meals (Hold if NPO/not eating meal)  Decreased Admelog correctional scale to low dose SQ before meals and low dose scale at bedtime  Consistent carbohydrate diet    Discharge Plan:  Semglee PEN (covered)  and Admelog PEN;  Doses TBD at discharge  Ensure she has supplies, including glucometer, glucose test strips, lancets, alcohol swabs and insulin PEN needles   Endo office emailed for appt Goal glucose 100-180 mg/dl  FS AC & HS  c/w Lantus  14 units SQ qHS   Decreased Admelog 8 units SQ TID before meals (Hold if NPO/not eating meal)  Decreased Admelog correctional scale to low dose SQ before meals and low dose scale at bedtime  Consistent carbohydrate diet    Discharge Plan:  Semglee PEN (covered)  and Admelog PEN ($3 copay);  Doses TBD at discharge  Ensure she has supplies, including glucometer, glucose test strips, lancets, alcohol swabs and insulin PEN needles   Endo office emailed for appt Goal glucose 100-180 mg/dl  FS AC & HS  c/w Lantus  14 units SQ qHS   Decreased Admelog 8 units SQ TID before meals (Hold if NPO/not eating meal)  Decreased Admelog correctional scale to low dose SQ before meals and low dose scale at bedtime  Consistent carbohydrate diet    Discharge Plan:  Semglee PEN (covered)  and Admelog PEN ($3 copay);  Doses TBD at discharge  Ensure she has supplies, including glucometer, glucose test strips, lancets, alcohol swabs and insulin PEN needles     Appointments made please add to discharge papers  5/24/21 Dietician 12pm  at [address] [place] [address]   6/15/2021 Dr Styles 2:15pm at [address] [place] [address]

## 2021-04-08 NOTE — PROGRESS NOTE ADULT - ASSESSMENT
51y/o Smooth Female with pmhx of T2DM (diagnosed 2005) and Hypertension (diagnosed 2005) p/w worsening shortness of breath and chest pain for 1 wk on 3/23. Due to insurance issues, was not able to take home metformin. At [facility] ED, pt was found to be in DKA with glucose level in 600s, proBNP 2107, trop 111 with ST depression in V5/V6. Endo was consulted  Pt was given Lasix IV push and admitted to CCU on bipap and nitro gtt. TTE done on 3/24 showed EF 35-40%, moderate to severe LV systolic dysfunction, apical thrombus seen, and trace pericardial effusion, and b/l pleural effusions. Pt was underwent cath on 3/25. LHC/RHC 3/25 showed PCW 30, CO 3.94, CI 2.42, pLAD 80% stenosis, Mid LAD 60% stenosis, Mid circumflex 70%, RCA 75% stenosis, RPDA 70% stenosis, RPLS 60% stenosis at ostium.   Patient underwent cardiac viability study 3/29/21, results reviewed.  Patient was subsequently transferred to [facility] for Preop CABG evaluation with Dr. Baig. Cardiac surgery workup completed.  4/2  Underwent CABG 3 /LEANA Bypass graft,post op course includes  fluid resuscitation inotropic support insulin gtt for  hyperglycemia  Extubated OR day  4/3VSS seen by PT disposition to home  with PT dig loaded   4/4 VSS  deintensified  Right pigtail placed for pleural  effusion 555  [place] d/c voiding. transferred to floor losartan 25 per Dr Baig. ASA Plavix lopressor 25 bid atorvastatin  80 ISS  lantus 10  premeal 8   disposition to home   4/5 VSS; RSR 80--110; d/c right pigtail as per Dr. Baig this am; ck post removal chest xray; ck echo; change asa 81 qd  4/6 HD stable.  PTC 310cc/24 hrs--ambulate and observe thru day.  Lop changed to toprol. Lasix/aldactone added.  4/7 VSS, mag 1.8 supplemented. Left chest tube 230cc/24h, another 140cc this AM after ambulation. Keep left chest tube in today and monitor drainage, possible d/c in AM.   4/8 VSS, Left chest tube output 410cc/24h, maintain to LWS. Admelog decreased to 8 units TID per Endo, medications sent to [facility] to check for coverage.  51y/o Smooth Female with pmhx of T2DM (diagnosed 2005) and Hypertension (diagnosed 2005) p/w worsening shortness of breath and chest pain for 1 wk on 3/23. Due to insurance issues, was not able to take home metformin. At [facility] ED, pt was found to be in DKA with glucose level in 600s, proBNP 2107, trop 111 with ST depression in V5/V6. Endo was consulted  Pt was given Lasix IV push and admitted to CCU on bipap and nitro gtt. TTE done on 3/24 showed EF 35-40%, moderate to severe LV systolic dysfunction, apical thrombus seen, and trace pericardial effusion, and b/l pleural effusions. Pt was underwent cath on 3/25. LHC/RHC 3/25 showed PCW 30, CO 3.94, CI 2.42, pLAD 80% stenosis, Mid LAD 60% stenosis, Mid circumflex 70%, RCA 75% stenosis, RPDA 70% stenosis, RPLS 60% stenosis at ostium.   Patient underwent cardiac viability study 3/29/21, results reviewed.  Patient was subsequently transferred to [facility] for Preop CABG evaluation with Dr. Baig. Cardiac surgery workup completed.  4/2  Underwent CABG 3 /LEANA Bypass graft,post op course includes  fluid resuscitation inotropic support insulin gtt for  hyperglycemia  Extubated OR day  4/3VSS seen by PT disposition to home  with PT dig loaded   4/4 VSS  deintensified  Right pigtail placed for pleural  effusion 555  [place] d/c voiding. transferred to floor losartan 25 per Dr Baig. ASA Plavix lopressor 25 bid atorvastatin  80 ISS  lantus 10  premeal 8   disposition to home   4/5 VSS; RSR 80--110; d/c right pigtail as per Dr. Baig this am; ck post removal chest xray; ck echo; change asa 81 qd  4/6 HD stable.  PTC 310cc/24 hrs--ambulate and observe thru day.  Lop changed to toprol. Lasix/aldactone added.  4/7 VSS, mag 1.8 supplemented. Left chest tube 230cc/24h, another 140cc this AM after ambulation. Keep left chest tube in today and monitor drainage, possible d/c in AM.   4/8 VSS, Left chest tube output 410cc/24h, placed to water seal per Dr. Malcolm. Admelog decreased to 8 units TID per Endo, insulin sent to [facility] to check for coverage.

## 2021-04-08 NOTE — PROGRESS NOTE ADULT - ASSESSMENT
50 year old woman with PMH HTN, HLD, uncontrolled DM2 with CAD, p/w worsening shortness of breath and chest pain for 1 week, admitted for management of acute CHF as well as hyperglycemia the 600's ,s/p CABG x3 on 4/2 with CTICU admission & insulin gtt post op, endocrine consult called for management of uncontrolled DM2, A1c is 13.9%. BG Very tightly controlled FS 77 to 129 since dinner. Lowered nutritional insulin & changed scale to low dose to prevent hypoglycemia . Continue current basal dose. -180mg/dl     Discussed discharge planning with patient: spoke with team Deandra DAUGHERTY  and [facility] pharmacist Brook regarding Jardiance & Ozempic- they are not covered and need a prior authorization which will take over 24 hours and team is planning for discharge tomorrow. Will Plan to discharge on basal bolus regimen. Endocrinology clinic emailed for appointment, awaiting appt information.  Discussed with patient she is agreeable, states she has taken admelog and basal insulin at home in past, patient able to verbally describe testing glucose and using insulin pen.     50 year old woman with PMH HTN, HLD, uncontrolled DM2 with CAD, p/w worsening shortness of breath and chest pain for 1 week, admitted for management of acute CHF as well as hyperglycemia the 600's ,s/p CABG x3 on 4/2 with CTICU admission & insulin gtt post op, endocrine consult called for management of uncontrolled DM2, A1c is 13.9%. BG Very tightly controlled FS 77 to 129 since dinner. Lowered nutritional insulin & changed scale to low dose to prevent hypoglycemia . Continue current basal dose. -180mg/dl     Discussed discharge planning with patient: spoke with team Deandra DAUGHERTY  and [facility] pharmacist Brook regarding Jardiance & Ozempic- they are not covered and need a prior authorization which will take over 24 hours and team is planning for discharge tomorrow. Will Plan to discharge on basal bolus regimen. Endocrinology office emailed for appointment, awaiting appt information.  Discussed with patient she is agreeable, states she has taken admelog and basal insulin at home in past, patient able to verbally describe testing glucose and using insulin pen.     50 year old woman with PMH HTN, HLD, uncontrolled DM2 with CAD, p/w worsening shortness of breath and chest pain for 1 week, admitted for management of acute CHF as well as hyperglycemia the 600's ,s/p CABG x3 on 4/2 with CTICU admission & insulin gtt post op, endocrine consult called for management of uncontrolled DM2, A1c is 13.9%. BG Very tightly controlled FS 77 to 129 since dinner. Lowered nutritional insulin & changed scale to low dose to prevent hypoglycemia . Continue current basal dose. -180mg/dl     Discussed discharge planning with patient: spoke with team Deandra DAUGHERTY  and [facility] pharmacist Brook regarding Jardiance & Ozempic- they are not covered and need a prior authorization which will take over 24 hours and team is planning for discharge tomorrow. Will Plan to discharge on basal bolus regimen. Endocrinology office emailed for appointment, awaiting appt information.  Discussed with patient she is agreeable, states she has taken admelog and basal insulin at home in past, patient able to verbally describe testing glucose and using insulin pen. Educated patient regarding consistent carbohydrate diet 45/60/60 and reviewed insulin pen use with patient verbally     50 year old woman with PMH HTN, HLD, uncontrolled DM2 with CAD, p/w worsening shortness of breath and chest pain for 1 week, admitted for management of acute CHF as well as hyperglycemia the 600's ,s/p CABG x3 on 4/2 with CTICU admission & insulin gtt post op, endocrine consult called for management of uncontrolled DM2, A1c is 13.9%. BG Very tightly controlled FS 77 to 129 since dinner. Lowered nutritional insulin & changed scale to low dose to prevent hypoglycemia . Continue current basal dose. -180mg/dl     Discussed discharge planning with patient: spoke with team Deandra DAUGHERTY  and [facility] pharmacist Brook regarding Jardiance & Ozempic- they are not covered and need a prior authorization which will take over 24 hours and team is planning for discharge tomorrow. Will Plan to discharge on basal bolus regimen. Endocrinology follow up appts made.  Discussed with patient she is agreeable, states she has taken admelog and basal insulin at home in past, patient able to verbally describe testing glucose and using insulin pen. Educated patient regarding consistent carbohydrate diet 45/60/60 and reviewed insulin pen use with patient verbally

## 2021-04-08 NOTE — PROGRESS NOTE ADULT - SUBJECTIVE AND OBJECTIVE BOX
DIABETES FOLLOW UP : Seen earlier today    INTERVAL HX: Pt reports tolerating PO's, ate scrambled eggs and coffee for breakfast, denies bedtime snack. Potential for discharge tomorrow per primary team. States she feels well, she told me her teenage son was a Type 2 Diabetic and since he was diagnosed the whole family has been more careful about what they eat and bring into the house. Per patient her usual meals at home include roti Daal , rice for dinner, yogurt for dinner, and tea with whole wheat roti for breakfast but this varies. She reports she has decreased her portions at home.        Review of Systems:  General: As above  Cardiovascular: No chest pain, palpitations  Respiratory: No SOB, no cough  GI: No nausea, vomiting, abdominal pain  Endocrine: no polyuria, polydipsia or S&Sx of hypoglycemia    Allergies    No Known Allergies    MEDICATIONS  (STANDING):  aspirin enteric coated 81 milliGRAM(s) Oral daily  atorvastatin 80 milliGRAM(s) Oral at bedtime  clopidogrel Tablet 75 milliGRAM(s) Oral daily  enoxaparin Injectable 40 milliGRAM(s) SubCutaneous daily  furosemide    Tablet 20 milliGRAM(s) Oral daily  insulin glargine Injectable (LANTUS) 14 Unit(s) SubCutaneous at bedtime  insulin lispro (ADMELOG) corrective regimen sliding scale   SubCutaneous three times a day before meals  insulin lispro (ADMELOG) corrective regimen sliding scale   SubCutaneous at bedtime  insulin lispro Injectable (ADMELOG) 8 Unit(s) SubCutaneous three times a day before meals  losartan 25 milliGRAM(s) Oral at bedtime  metoprolol succinate ER 50 milliGRAM(s) Oral daily  pantoprazole    Tablet 40 milliGRAM(s) Oral before breakfast  polyethylene glycol 3350 17 Gram(s) Oral daily  sodium chloride 0.9% lock flush 3 milliLiter(s) IV Push every 8 hours  spironolactone 25 milliGRAM(s) Oral daily      PHYSICAL EXAM:  VITALS: T(C): 36.7 (04-08-21 @ 12:00)  T(F): 98.1 (04-08-21 @ 12:00), Max: 98.1 (04-08-21 @ 12:00)  HR: 88 (04-08-21 @ 12:00) (75 - 88)  BP: 122/71 (04-08-21 @ 12:00) (98/65 - 122/71)  RR:  (18 - 18)  SpO2:  (96% - 99%)  Wt(kg): --  GENERAL: female sitting in chair in NAD  RESPIRATORY: Clear to auscultation bilaterally; No rales, rhonchi, wheezing, or rubs  CARDIOVASCULAR: Regular rate and rhythm; No murmurs; no peripheral edema extremities warm/dry; Left Chest tube dressing C/D/I ,anterior sternal incision cdi  GI: Soft, nontender, non distended, normal bowel sounds    LABS:  POCT Blood Glucose.: 101 mg/dL (04-08-21 @ 11:33)  POCT Blood Glucose.: 124 mg/dL (04-08-21 @ 07:51)  POCT Blood Glucose.: 129 mg/dL (04-07-21 @ 21:02)  POCT Blood Glucose.: 77 mg/dL (04-07-21 @ 16:38)  POCT Blood Glucose.: 145 mg/dL (04-07-21 @ 11:30)  POCT Blood Glucose.: 186 mg/dL (04-07-21 @ 07:31)  POCT Blood Glucose.: 190 mg/dL (04-06-21 @ 21:49)  POCT Blood Glucose.: 196 mg/dL (04-06-21 @ 16:43)  POCT Blood Glucose.: 197 mg/dL (04-06-21 @ 11:12)  POCT Blood Glucose.: 210 mg/dL (04-06-21 @ 07:38)  POCT Blood Glucose.: 219 mg/dL (04-05-21 @ 21:15)  POCT Blood Glucose.: 202 mg/dL (04-05-21 @ 16:27)                            9.1    7.98  )-----------( 285      ( 08 Apr 2021 05:22 )             28.6       04-08    138  |  106  |  10  ----------------------------<  105<H>  4.0   |  22  |  0.54      EGFR if non : 111    Ca    8.2<L>      04-08  Mg     2.0     04-08  Phos  1.4     04-06    TPro  5.0<L>  /  Alb  2.3<L>  /  TBili  0.4  /  DBili  x   /  AST  11  /  ALT  11  /  AlkPhos  54  04-08 03-24 Chol 198 LDL -- HDL 63 Trig 89    Thyroid Function Tests:  03-30 @ 03:59 TSH 5.37   03-23 @ 14:38 TSH 1.77        A1C with Estimated Average Glucose Result: 13.9 % (03-24-21 @ 00:08)  A1C with Estimated Average Glucose Result: 13.9 % (03-23-21 @ 15:35)      Estimated Average Glucose: 352 mg/dL (03-24-21 @ 00:08)  Estimated Average Glucose: 352 mg/dL (03-23-21 @ 15:35)

## 2021-04-08 NOTE — PROGRESS NOTE ADULT - SUBJECTIVE AND OBJECTIVE BOX
Subjective: Pt states "Hello" denies any CP or SOB. No acute events overnight.     Telemetry: SR 68    Vital Signs Last 24 Hrs  T(C): 36.6 (21 @ 04:38), Max: 37.1 (21 @ 11:46)  T(F): 97.9 (21 @ 04:38), Max: 98.7 (21 @ 11:46)  HR: 75 (21 @ 04:38) (75 - 97)  BP: 108/72 (21 @ 04:38) (98/65 - 122/78)  RR: 18 (21 @ 04:38) (18 - 18)  SpO2: 96% (21 @ 04:38) (96% - 96%)              @ 07:01  -   @ 07:00  --------------------------------------------------------  IN: 1060 mL / OUT: 2510 mL / NET: -1450 mL        Daily Weight in k.9 (2021 07:13)                        9.1    7.98  )-----------( 285      ( 2021 05:22 )             28.6     138  |  106  |  10  ----------------------------<  105<H>  4.0   |  22  |  0.54    Mg     2.0     -    AST  11  /  ALT  11  /  AlkPhos  54  -08        CAPILLARY BLOOD GLUCOSE  124 - 129            PHYSICAL EXAM  Neurology: A&Ox3, NAD  CV : RRR+S1S2  Sternal Wound: MSI CDI ODESSA, Stable  Lungs: Respirations non-labored, B/L BS clear, diminished at bases  +Left pleural chest tube to LWS with serosanguinous drainage, no air leak  Abdomen: Soft, NT/ND, +BSx4Q, last BM  (-)N/V/D  : Voiding without difficulty  Extremities: B/L LE trace edema, negative calf tenderness, +PP, B/L SVG incision CDI              MEDICATIONS  acetaminophen   Tablet .. 650 milliGRAM(s) Oral every 6 hours PRN  aspirin enteric coated 81 milliGRAM(s) Oral daily  atorvastatin 80 milliGRAM(s) Oral at bedtime  clopidogrel Tablet 75 milliGRAM(s) Oral daily  enoxaparin Injectable 40 milliGRAM(s) SubCutaneous daily  furosemide    Tablet 20 milliGRAM(s) Oral daily  insulin glargine Injectable (LANTUS) 14 Unit(s) SubCutaneous at bedtime  insulin lispro (ADMELOG) corrective regimen sliding scale   SubCutaneous three times a day before meals  insulin lispro (ADMELOG) corrective regimen sliding scale   SubCutaneous at bedtime  insulin lispro Injectable (ADMELOG) 8 Unit(s) SubCutaneous three times a day before meals  losartan 25 milliGRAM(s) Oral at bedtime  metoprolol succinate ER 50 milliGRAM(s) Oral daily  oxyCODONE    IR 5 milliGRAM(s) Oral every 4 hours PRN  pantoprazole    Tablet 40 milliGRAM(s) Oral before breakfast  polyethylene glycol 3350 17 Gram(s) Oral daily  sodium chloride 0.9% lock flush 3 milliLiter(s) IV Push every 8 hours  spironolactone 25 milliGRAM(s) Oral daily      Physical Therapy Rec:   Home  [ X ]   Home w/ PT  [  ]  Rehab  [  ]    Discussed with Cardiothoracic Team at AM rounds.

## 2021-04-09 ENCOUNTER — TRANSCRIPTION ENCOUNTER (OUTPATIENT)
Age: 51
End: 2021-04-09

## 2021-04-09 VITALS
HEART RATE: 75 BPM | RESPIRATION RATE: 18 BRPM | OXYGEN SATURATION: 99 % | SYSTOLIC BLOOD PRESSURE: 94 MMHG | TEMPERATURE: 98 F | DIASTOLIC BLOOD PRESSURE: 53 MMHG

## 2021-04-09 LAB
ALBUMIN SERPL ELPH-MCNC: 2.8 G/DL — LOW (ref 3.3–5)
ALP SERPL-CCNC: 60 U/L — SIGNIFICANT CHANGE UP (ref 40–120)
ALT FLD-CCNC: 8 U/L — LOW (ref 10–45)
ANION GAP SERPL CALC-SCNC: 13 MMOL/L — SIGNIFICANT CHANGE UP (ref 5–17)
AST SERPL-CCNC: 13 U/L — SIGNIFICANT CHANGE UP (ref 10–40)
BASOPHILS # BLD AUTO: 0.05 K/UL — SIGNIFICANT CHANGE UP (ref 0–0.2)
BASOPHILS NFR BLD AUTO: 0.5 % — SIGNIFICANT CHANGE UP (ref 0–2)
BILIRUB SERPL-MCNC: 0.4 MG/DL — SIGNIFICANT CHANGE UP (ref 0.2–1.2)
BUN SERPL-MCNC: 9 MG/DL — SIGNIFICANT CHANGE UP (ref 7–23)
CALCIUM SERPL-MCNC: 8.8 MG/DL — SIGNIFICANT CHANGE UP (ref 8.4–10.5)
CHLORIDE SERPL-SCNC: 107 MMOL/L — SIGNIFICANT CHANGE UP (ref 96–108)
CO2 SERPL-SCNC: 21 MMOL/L — LOW (ref 22–31)
CREAT SERPL-MCNC: 0.57 MG/DL — SIGNIFICANT CHANGE UP (ref 0.5–1.3)
EOSINOPHIL # BLD AUTO: 0.41 K/UL — SIGNIFICANT CHANGE UP (ref 0–0.5)
EOSINOPHIL NFR BLD AUTO: 4.1 % — SIGNIFICANT CHANGE UP (ref 0–6)
GLUCOSE BLDC GLUCOMTR-MCNC: 129 MG/DL — HIGH (ref 70–99)
GLUCOSE BLDC GLUCOMTR-MCNC: 90 MG/DL — SIGNIFICANT CHANGE UP (ref 70–99)
GLUCOSE SERPL-MCNC: 134 MG/DL — HIGH (ref 70–99)
HCT VFR BLD CALC: 30 % — LOW (ref 34.5–45)
HGB BLD-MCNC: 9.7 G/DL — LOW (ref 11.5–15.5)
IMM GRANULOCYTES NFR BLD AUTO: 0.4 % — SIGNIFICANT CHANGE UP (ref 0–1.5)
LYMPHOCYTES # BLD AUTO: 2.66 K/UL — SIGNIFICANT CHANGE UP (ref 1–3.3)
LYMPHOCYTES # BLD AUTO: 26.3 % — SIGNIFICANT CHANGE UP (ref 13–44)
MAGNESIUM SERPL-MCNC: 1.9 MG/DL — SIGNIFICANT CHANGE UP (ref 1.6–2.6)
MCHC RBC-ENTMCNC: 26.9 PG — LOW (ref 27–34)
MCHC RBC-ENTMCNC: 32.3 GM/DL — SIGNIFICANT CHANGE UP (ref 32–36)
MCV RBC AUTO: 83.1 FL — SIGNIFICANT CHANGE UP (ref 80–100)
MONOCYTES # BLD AUTO: 1.02 K/UL — HIGH (ref 0–0.9)
MONOCYTES NFR BLD AUTO: 10.1 % — SIGNIFICANT CHANGE UP (ref 2–14)
NEUTROPHILS # BLD AUTO: 5.94 K/UL — SIGNIFICANT CHANGE UP (ref 1.8–7.4)
NEUTROPHILS NFR BLD AUTO: 58.6 % — SIGNIFICANT CHANGE UP (ref 43–77)
NRBC # BLD: 0 /100 WBCS — SIGNIFICANT CHANGE UP (ref 0–0)
PLATELET # BLD AUTO: 356 K/UL — SIGNIFICANT CHANGE UP (ref 150–400)
POTASSIUM SERPL-MCNC: 4.2 MMOL/L — SIGNIFICANT CHANGE UP (ref 3.5–5.3)
POTASSIUM SERPL-SCNC: 4.2 MMOL/L — SIGNIFICANT CHANGE UP (ref 3.5–5.3)
PROT SERPL-MCNC: 5.8 G/DL — LOW (ref 6–8.3)
RBC # BLD: 3.61 M/UL — LOW (ref 3.8–5.2)
RBC # FLD: 13.8 % — SIGNIFICANT CHANGE UP (ref 10.3–14.5)
SODIUM SERPL-SCNC: 141 MMOL/L — SIGNIFICANT CHANGE UP (ref 135–145)
WBC # BLD: 10.12 K/UL — SIGNIFICANT CHANGE UP (ref 3.8–10.5)
WBC # FLD AUTO: 10.12 K/UL — SIGNIFICANT CHANGE UP (ref 3.8–10.5)

## 2021-04-09 PROCEDURE — 82565 ASSAY OF CREATININE: CPT

## 2021-04-09 PROCEDURE — 97110 THERAPEUTIC EXERCISES: CPT

## 2021-04-09 PROCEDURE — 94010 BREATHING CAPACITY TEST: CPT

## 2021-04-09 PROCEDURE — 83605 ASSAY OF LACTIC ACID: CPT

## 2021-04-09 PROCEDURE — 82330 ASSAY OF CALCIUM: CPT

## 2021-04-09 PROCEDURE — C1751: CPT

## 2021-04-09 PROCEDURE — 86891 AUTOLOGOUS BLOOD OP SALVAGE: CPT

## 2021-04-09 PROCEDURE — 82962 GLUCOSE BLOOD TEST: CPT

## 2021-04-09 PROCEDURE — 85610 PROTHROMBIN TIME: CPT

## 2021-04-09 PROCEDURE — 85384 FIBRINOGEN ACTIVITY: CPT

## 2021-04-09 PROCEDURE — 71045 X-RAY EXAM CHEST 1 VIEW: CPT

## 2021-04-09 PROCEDURE — 82947 ASSAY GLUCOSE BLOOD QUANT: CPT

## 2021-04-09 PROCEDURE — 83735 ASSAY OF MAGNESIUM: CPT

## 2021-04-09 PROCEDURE — 80048 BASIC METABOLIC PNL TOTAL CA: CPT

## 2021-04-09 PROCEDURE — 84484 ASSAY OF TROPONIN QUANT: CPT

## 2021-04-09 PROCEDURE — 82435 ASSAY OF BLOOD CHLORIDE: CPT

## 2021-04-09 PROCEDURE — 86769 SARS-COV-2 COVID-19 ANTIBODY: CPT

## 2021-04-09 PROCEDURE — 94002 VENT MGMT INPAT INIT DAY: CPT

## 2021-04-09 PROCEDURE — 80053 COMPREHEN METABOLIC PANEL: CPT

## 2021-04-09 PROCEDURE — P9045: CPT

## 2021-04-09 PROCEDURE — 71045 X-RAY EXAM CHEST 1 VIEW: CPT | Mod: 26,76

## 2021-04-09 PROCEDURE — U0005: CPT

## 2021-04-09 PROCEDURE — 85027 COMPLETE CBC AUTOMATED: CPT

## 2021-04-09 PROCEDURE — C1894: CPT

## 2021-04-09 PROCEDURE — 84295 ASSAY OF SERUM SODIUM: CPT

## 2021-04-09 PROCEDURE — C8929: CPT

## 2021-04-09 PROCEDURE — 36430 TRANSFUSION BLD/BLD COMPNT: CPT

## 2021-04-09 PROCEDURE — 87086 URINE CULTURE/COLONY COUNT: CPT

## 2021-04-09 PROCEDURE — 86900 BLOOD TYPING SEROLOGIC ABO: CPT

## 2021-04-09 PROCEDURE — 85025 COMPLETE CBC W/AUTO DIFF WBC: CPT

## 2021-04-09 PROCEDURE — 85014 HEMATOCRIT: CPT

## 2021-04-09 PROCEDURE — 85018 HEMOGLOBIN: CPT

## 2021-04-09 PROCEDURE — 81025 URINE PREGNANCY TEST: CPT

## 2021-04-09 PROCEDURE — C1769: CPT

## 2021-04-09 PROCEDURE — 84100 ASSAY OF PHOSPHORUS: CPT

## 2021-04-09 PROCEDURE — P9016: CPT

## 2021-04-09 PROCEDURE — 84132 ASSAY OF SERUM POTASSIUM: CPT

## 2021-04-09 PROCEDURE — 86923 COMPATIBILITY TEST ELECTRIC: CPT

## 2021-04-09 PROCEDURE — 84443 ASSAY THYROID STIM HORMONE: CPT

## 2021-04-09 PROCEDURE — 97116 GAIT TRAINING THERAPY: CPT

## 2021-04-09 PROCEDURE — 86901 BLOOD TYPING SEROLOGIC RH(D): CPT

## 2021-04-09 PROCEDURE — 82553 CREATINE MB FRACTION: CPT

## 2021-04-09 PROCEDURE — 93005 ELECTROCARDIOGRAM TRACING: CPT

## 2021-04-09 PROCEDURE — 87077 CULTURE AEROBIC IDENTIFY: CPT

## 2021-04-09 PROCEDURE — 82550 ASSAY OF CK (CPK): CPT

## 2021-04-09 PROCEDURE — 99232 SBSQ HOSP IP/OBS MODERATE 35: CPT

## 2021-04-09 PROCEDURE — 97530 THERAPEUTIC ACTIVITIES: CPT

## 2021-04-09 PROCEDURE — 87640 STAPH A DNA AMP PROBE: CPT

## 2021-04-09 PROCEDURE — 93880 EXTRACRANIAL BILAT STUDY: CPT

## 2021-04-09 PROCEDURE — 97162 PT EVAL MOD COMPLEX 30 MIN: CPT

## 2021-04-09 PROCEDURE — P9047: CPT

## 2021-04-09 PROCEDURE — U0003: CPT

## 2021-04-09 PROCEDURE — 82803 BLOOD GASES ANY COMBINATION: CPT

## 2021-04-09 PROCEDURE — P9041: CPT

## 2021-04-09 PROCEDURE — 85730 THROMBOPLASTIN TIME PARTIAL: CPT

## 2021-04-09 PROCEDURE — 81001 URINALYSIS AUTO W/SCOPE: CPT

## 2021-04-09 PROCEDURE — 86850 RBC ANTIBODY SCREEN: CPT

## 2021-04-09 PROCEDURE — C1889: CPT

## 2021-04-09 PROCEDURE — 87641 MR-STAPH DNA AMP PROBE: CPT

## 2021-04-09 RX ORDER — ATORVASTATIN CALCIUM 80 MG/1
1 TABLET, FILM COATED ORAL
Qty: 30 | Refills: 0
Start: 2021-04-09 | End: 2021-05-08

## 2021-04-09 RX ORDER — LOSARTAN POTASSIUM 100 MG/1
1 TABLET, FILM COATED ORAL
Qty: 30 | Refills: 0
Start: 2021-04-09 | End: 2021-05-08

## 2021-04-09 RX ORDER — METOPROLOL TARTRATE 50 MG
1 TABLET ORAL
Qty: 30 | Refills: 0
Start: 2021-04-09 | End: 2021-05-08

## 2021-04-09 RX ORDER — POLYETHYLENE GLYCOL 3350 17 G/17G
17 POWDER, FOR SOLUTION ORAL
Qty: 0 | Refills: 0 | DISCHARGE
Start: 2021-04-09

## 2021-04-09 RX ORDER — CLOPIDOGREL BISULFATE 75 MG/1
1 TABLET, FILM COATED ORAL
Qty: 30 | Refills: 0
Start: 2021-04-09 | End: 2021-05-08

## 2021-04-09 RX ORDER — SPIRONOLACTONE 25 MG/1
1 TABLET, FILM COATED ORAL
Qty: 30 | Refills: 0
Start: 2021-04-09 | End: 2021-05-08

## 2021-04-09 RX ORDER — ISOPROPYL ALCOHOL, BENZOCAINE .7; .06 ML/ML; ML/ML
1 SWAB TOPICAL
Qty: 100 | Refills: 1
Start: 2021-04-09 | End: 2021-05-28

## 2021-04-09 RX ORDER — ACETAMINOPHEN 500 MG
2 TABLET ORAL
Qty: 0 | Refills: 0 | DISCHARGE
Start: 2021-04-09

## 2021-04-09 RX ORDER — INSULIN GLARGINE 100 [IU]/ML
12 INJECTION, SOLUTION SUBCUTANEOUS
Qty: 1 | Refills: 0
Start: 2021-04-09

## 2021-04-09 RX ORDER — PANTOPRAZOLE SODIUM 20 MG/1
1 TABLET, DELAYED RELEASE ORAL
Qty: 10 | Refills: 0
Start: 2021-04-09 | End: 2021-04-18

## 2021-04-09 RX ORDER — FUROSEMIDE 40 MG
1 TABLET ORAL
Qty: 30 | Refills: 0
Start: 2021-04-09 | End: 2021-05-08

## 2021-04-09 RX ORDER — ASPIRIN/CALCIUM CARB/MAGNESIUM 324 MG
1 TABLET ORAL
Qty: 30 | Refills: 0
Start: 2021-04-09 | End: 2021-05-08

## 2021-04-09 RX ORDER — OXYCODONE HYDROCHLORIDE 5 MG/1
1 TABLET ORAL
Qty: 20 | Refills: 0
Start: 2021-04-09 | End: 2021-04-13

## 2021-04-09 RX ORDER — INSULIN LISPRO 100/ML
6 VIAL (ML) SUBCUTANEOUS
Qty: 1 | Refills: 0
Start: 2021-04-09

## 2021-04-09 RX ADMIN — SODIUM CHLORIDE 3 MILLILITER(S): 9 INJECTION INTRAMUSCULAR; INTRAVENOUS; SUBCUTANEOUS at 06:44

## 2021-04-09 RX ADMIN — OXYCODONE HYDROCHLORIDE 5 MILLIGRAM(S): 5 TABLET ORAL at 12:03

## 2021-04-09 RX ADMIN — Medication 8 UNIT(S): at 12:03

## 2021-04-09 RX ADMIN — Medication 81 MILLIGRAM(S): at 11:02

## 2021-04-09 RX ADMIN — Medication 8 UNIT(S): at 08:03

## 2021-04-09 RX ADMIN — SODIUM CHLORIDE 3 MILLILITER(S): 9 INJECTION INTRAMUSCULAR; INTRAVENOUS; SUBCUTANEOUS at 14:03

## 2021-04-09 RX ADMIN — Medication 650 MILLIGRAM(S): at 12:02

## 2021-04-09 RX ADMIN — OXYCODONE HYDROCHLORIDE 5 MILLIGRAM(S): 5 TABLET ORAL at 10:59

## 2021-04-09 RX ADMIN — Medication 20 MILLIGRAM(S): at 05:24

## 2021-04-09 RX ADMIN — PANTOPRAZOLE SODIUM 40 MILLIGRAM(S): 20 TABLET, DELAYED RELEASE ORAL at 05:24

## 2021-04-09 RX ADMIN — Medication 650 MILLIGRAM(S): at 10:59

## 2021-04-09 RX ADMIN — CLOPIDOGREL BISULFATE 75 MILLIGRAM(S): 75 TABLET, FILM COATED ORAL at 11:02

## 2021-04-09 RX ADMIN — SPIRONOLACTONE 25 MILLIGRAM(S): 25 TABLET, FILM COATED ORAL at 05:25

## 2021-04-09 RX ADMIN — Medication 50 MILLIGRAM(S): at 05:25

## 2021-04-09 RX ADMIN — ENOXAPARIN SODIUM 40 MILLIGRAM(S): 100 INJECTION SUBCUTANEOUS at 11:01

## 2021-04-09 NOTE — DISCHARGE NOTE PROVIDER - CARE PROVIDERS DIRECT ADDRESSES
[email or web address]

## 2021-04-09 NOTE — DISCHARGE NOTE PROVIDER - NSDCFUSCHEDAPPT_GEN_ALL_CORE_FT
[facility] ; 05/24/2021 ; [facility] Endocr [address] [facility]  [facility]BARO [facility] ; 06/15/2021 ; [facility] [address] [facility] ; [facility] CT Surg [address] [facility] ; 05/24/2021 ; [facility] Med Endocr [address] [facility]  ALBARO [facility] ; 06/15/2021 ; [facility] Endocr [address] [facility]

## 2021-04-09 NOTE — DISCHARGE NOTE NURSING/CASE MANAGEMENT/SOCIAL WORK - PATIENT PORTAL LINK FT
You can access the FollowMyHealth Patient Portal offered by [facility] by registering at the following website: [email or web address]. By joining [facility]’s FollowMyHealth portal, you will also be able to view your health information using other applications (apps) compatible with our system.

## 2021-04-09 NOTE — PROGRESS NOTE ADULT - PROBLEM SELECTOR PROBLEM 2
Essential hypertension
Type 2 diabetes mellitus without complication, unspecified whether long term insulin use
Type 2 diabetes mellitus without complication, unspecified whether long term insulin use
Essential hypertension
Type 2 diabetes mellitus without complication, unspecified whether long term insulin use
Essential hypertension
Essential hypertension
Type 2 diabetes mellitus without complication, unspecified whether long term insulin use
Essential hypertension
Type 2 diabetes mellitus without complication, unspecified whether long term insulin use
Essential hypertension

## 2021-04-09 NOTE — DISCHARGE NOTE PROVIDER - NSDCMRMEDTOKEN_GEN_ALL_CORE_FT
Admelog SoloStar 100 units/mL injectable solution: 8 unit(s) injectable 3 times a day (before meals)   ICD E11.65 diabetes mellitus  alcohol swabs : Apply topically to affected area 4 times a day   aspirin 81 mg oral delayed release tablet: 1 tab(s) orally once a day  atorvastatin 20 mg oral tablet: 1 tab(s) orally once a day (at bedtime)  glucometer (per patient&#x27;s insurance): Test blood sugars four times a day. Dispense #1 glucometer.  glucometer (per patient&#x27;s insurance): Test blood sugars four times a day. Dispense #1 glucometer.  glucose tablets: Follow instructions on bottle when sugar is low.  heparin 100 units/mL-D5% intravenous solution: 900 international unit(s) intravenous drip  insulin glargine: 17 unit(s) subcutaneous once a day (at bedtime)  insulin lispro 100 units/mL injectable solution: 8 unit(s) injectable 3 times a day (before meals)  Insulin Pen Needles, 4mm: 1 application subcutaneously 4 times a day. ** Use with insulin pen **   Insulin Pen Needles, 4mm: 1 application subcutaneously 4 times a day. ** Use with insulin pen **   lancets: 1 application subcutaneously 4 times a day   lancets: 1 application subcutaneously 4 times a day   Lantus Solostar Pen 100 units/mL subcutaneous solution: 17 unit(s) subcutaneous once a day   losartan 25 mg oral tablet: 1 tab(s) orally once a day  metoprolol tartrate 25 mg oral tablet: 1 tab(s) orally 2 times a day  Semglee 100 units/mL subcutaneous solution: 14 unit(s) subcutaneous once a day (at bedtime)  ICD E11.65 diabetes mellitus   test strips (per patient&#x27;s insurance): 1 application subcutaneously 4 times a day. ** Compatible with patient&#x27;s glucometer **  torsemide 20 mg oral tablet: 2 tab(s) orally once a day   acetaminophen 325 mg oral tablet: 2 tab(s) orally every 6 hours, As needed, Mild Pain (1 - 3)  alcohol swabs : Apply topically to affected area 4 times a day   aspirin 81 mg oral delayed release tablet: 1 tab(s) orally once a day  atorvastatin 80 mg oral tablet: 1 tab(s) orally once a day (at bedtime)  clopidogrel 75 mg oral tablet: 1 tab(s) orally once a day  furosemide 20 mg oral tablet: 1 tab(s) orally once a day  glucometer (per patient&#x27;s insurance): 1 application subcutaneous 4 times a day   glucose tablets: 1 tab(s) chewed once a day   Insulin Pen Needles, 4mm: 1 application subcutaneously 4 times a day . ** Use with insulin pen **   lancets: 1 application subcutaneously 4 times a day   losartan 25 mg oral tablet: 1 tab(s) orally once a day (at bedtime)  metoprolol succinate 50 mg oral tablet, extended release: 1 tab(s) orally once a day  oxyCODONE 5 mg oral tablet: 1 tab(s) orally every 6 hours, As Needed -Moderate Pain (4 - 6) MDD:4  pantoprazole 40 mg oral delayed release tablet: 1 tab(s) orally once a day (before a meal)  polyethylene glycol 3350 oral powder for reconstitution: 17 gram(s) orally once a day  spironolactone 25 mg oral tablet: 1 tab(s) orally once a day  test strips (per patient&#x27;s insurance): 1 application subcutaneously 4 times a day . ** Compatible with patient&#x27;s glucometer **    acetaminophen 325 mg oral tablet: 2 tab(s) orally every 6 hours, As needed, Mild Pain (1 - 3)  Admelog SoloStar 100 units/mL injectable solution: 6 unit(s) subcutaneous 3 times a day before meals   alcohol swabs : Apply topically to affected area 4 times a day   aspirin 81 mg oral delayed release tablet: 1 tab(s) orally once a day  atorvastatin 80 mg oral tablet: 1 tab(s) orally once a day (at bedtime)  clopidogrel 75 mg oral tablet: 1 tab(s) orally once a day  furosemide 20 mg oral tablet: 1 tab(s) orally once a day  glucometer (per patient&#x27;s insurance): 1 application subcutaneous 4 times a day   glucose tablets: 1 tab(s) chewed once a day   Insulin Pen Needles, 4mm: 1 application subcutaneously 4 times a day . ** Use with insulin pen **   lancets: 1 application subcutaneously 4 times a day   losartan 25 mg oral tablet: 1 tab(s) orally once a day (at bedtime)  metoprolol succinate 50 mg oral tablet, extended release: 1 tab(s) orally once a day  oxyCODONE 5 mg oral tablet: 1 tab(s) orally every 6 hours, As Needed -Moderate Pain (4 - 6) MDD:4  pantoprazole 40 mg oral delayed release tablet: 1 tab(s) orally once a day (before a meal)  polyethylene glycol 3350 oral powder for reconstitution: 17 gram(s) orally once a day  Semglee Prefilled Pen 100 units/mL subcutaneous solution: 12 unit(s) subcutaneous once a day (at bedtime)  spironolactone 25 mg oral tablet: 1 tab(s) orally once a day  test strips (per patient&#x27;s insurance): 1 application subcutaneously 4 times a day . ** Compatible with patient&#x27;s glucometer **

## 2021-04-09 NOTE — PROGRESS NOTE ADULT - PROBLEM SELECTOR PLAN 3
LDL target less than 70, above goal at 117  Continue Atorvastatin 20mg po qhs    Plan d/w ACP on 2 Naylor & Patient    Mariosl Seymour MD  Endocrinology Attending  Mondays and Tuesdays 9am-6pm: [phone] (pager)  Other days, night and weekend: [phone]
LDL target less than 70, above goal at 117  Continue Atorvastatin 20mg po qhs    Marcie Burgess DO
LDL target less than 70, above goal at 117  Continue Atorvastatin 20mg po qhs  Discussed with NP Ying Seymour MD  Endocrinology Attending  Mondays and Tuesdays 9am-6pm: [phone] (pager)  Other days, night and weekend: [phone]
LDL target less than 70, above goal at 117  Continue Atorvastatin 20mg po qhs    Plan d/w Patient and Emily Santiago NP   In House Pager: [phone]   office:  [phone] (M-F 9a-5pm)               [phone] (nights/weekends)
LDL target less than 70, above goal at 117  Continue Atorvastatin 20mg po qhs    Plan d/w ACP Genevieve & Patient  Radha Santiago NP   In House Pager: [phone]   office:  [phone] (M-F 9a-5pm)               [phone] (nights/weekends)
s/p 4/4 rt pigtail placement for effusion  d/c right pigtail this am as per Dr. Baig  ck f/u chest xray
LDL target less than 70, above goal at 117  Continue Atorvastatin 20mg po qhs  Plan d/w Patient and Deandra Santiago NP   In House Pager: [phone]   office:  [phone] (M-F 9a-5pm)               [phone] (nights/weekends)

## 2021-04-09 NOTE — PROGRESS NOTE ADULT - PROBLEM SELECTOR PLAN 1
continue postop care  continue asa81 daily, plavix 75 daily, atorvastatin 80 HS and Toprol XL 50 QD  left chest tube to water seal -- output still high, possible d/c in AM  Continue diuresis on lasix 20mg po daily and aldactone 25mg daily  Daily weight, strict I & Os  Cough and deep breathe, Incentive Spirometry Q1h, Chest PT.  Ambulate 4x daily as tolerated and with PT.   Discharge planning- home

## 2021-04-09 NOTE — DISCHARGE NOTE PROVIDER - REASON FOR ADMISSION
s/p 4/2/2021 open heart surgery: triple bypass; LEANA ligation  TVD s/p 4/2/2021 open heart surgery: triple bypass/ LEANA ligation  4/2 open heart surgery: triple bypass/LENAA ligation

## 2021-04-09 NOTE — PROGRESS NOTE ADULT - PROBLEM SELECTOR PROBLEM 1
Coronary artery disease involving native coronary artery of native heart, angina presence unspecified
S/P CABG x 3
Uncontrolled type 2 diabetes mellitus with hyperglycemia
S/P CABG x 3
Uncontrolled type 2 diabetes mellitus with hyperglycemia
S/P CABG x 3
Uncontrolled type 2 diabetes mellitus with hyperglycemia
S/P CABG x 3
Uncontrolled type 2 diabetes mellitus with hyperglycemia
Coronary artery disease involving native coronary artery of native heart, angina presence unspecified
Uncontrolled type 2 diabetes mellitus with hyperglycemia
S/P CABG x 3
S/P CABG x 3
Uncontrolled type 2 diabetes mellitus with hyperglycemia

## 2021-04-09 NOTE — DISCHARGE NOTE PROVIDER - HOSPITAL COURSE
49y/o Smooth Female with pmhx of T2DM (diagnosed 2005) and Hypertension (diagnosed 2005) p/w worsening shortness of breath and chest pain for 1 wk on 3/23. Due to insurance issues, was not able to take home metformin. At [facility] ED, pt was found to be in DKA with glucose level in 600s, proBNP 2107, trop 111 with ST depression in V5/V6. Endo was consulted  Pt was given Lasix IV push and admitted to CCU on bipap and nitro gtt. TTE done on 3/24 showed EF 35-40%, moderate to severe LV systolic dysfunction, apical thrombus seen, and trace pericardial effusion, and b/l pleural effusions. Pt was underwent cath on 3/25. LHC/RHC 3/25 showed PCW 30, CO 3.94, CI 2.42, pLAD 80% stenosis, Mid LAD 60% stenosis, Mid circumflex 70%, RCA 75% stenosis, RPDA 70% stenosis, RPLS 60% stenosis at ostium.   Patient underwent cardiac viability study 3/29/21, results reviewed.  Patient was subsequently transferred to [facility] for Preop CABG evaluation with Dr. Baig. Cardiac surgery workup completed.  4/2  Underwent CABG 3 /LEANA Bypass graft,post op course includes  fluid resuscitation inotropic support insulin gtt for  hyperglycemia  Extubated OR day  4/3VSS seen by PT disposition to home  with PT dig loaded   4/4 VSS  deintensified  Right pigtail placed for pleural  effusion 555  Molina d/c voiding. transferred to floor losartan 25 per Dr Baig. ASA Plavix lopressor 25 bid atorvastatin  80 ISS  lantus 10  premeal 8   disposition to home   4/5 VSS; RSR 80--110; d/c right pigtail as per Dr. Baig this am; ck post removal chest xray; ck echo; change asa 81 qd  4/6 HD stable.  PTC 310cc/24 hrs--ambulate and observe thru day.  Lop changed to toprol. Lasix/aldactone added.  4/7 VSS, mag 1.8 supplemented. Left chest tube 230cc/24h, another 140cc this AM after ambulation. Keep left chest tube in today and monitor drainage, possible d/c in AM.   4/8 VSS, Left chest tube output 410cc/24h, placed to water seal per Dr. Malcolm. Admelog decreased to 8 units TID per Alana, insulin sent to [employer] to check for coverage.   4/9 VSS; d/c left ct; ck chest xray; discharge pt home today as per Dr. Malcolm ; insulin as per endo 51y/o Smooth Female with pmhx of T2DM (diagnosed 2005) and Hypertension (diagnosed 2005) p/w worsening shortness of breath and chest pain for 1 wk on 3/23. Due to insurance issues, was not able to take home metformin. At [facility] ED, pt was found to be in DKA with glucose level in 600s, proBNP 2107, trop 111 with ST depression in V5/V6. Endo was consulted  Pt was given Lasix IV push and admitted to CCU on bipap and nitro gtt. TTE done on 3/24 showed EF 35-40%, moderate to severe LV systolic dysfunction, apical thrombus seen, and trace pericardial effusion, and b/l pleural effusions. Pt was underwent cath on 3/25. LHC/RHC 3/25 showed PCW 30, CO 3.94, CI 2.42, pLAD 80% stenosis, Mid LAD 60% stenosis, Mid circumflex 70%, RCA 75% stenosis, RPDA 70% stenosis, RPLS 60% stenosis at ostium.   Patient underwent cardiac viability study 3/29/21, results reviewed.  Patient was subsequently transferred to [facility] for Preop CABG evaluation with Dr. Baig. Cardiac surgery workup completed.  4/2  Underwent CABG 3 /LEANA Bypass graft,post op course includes  fluid resuscitation inotropic support insulin gtt for  hyperglycemia  Extubated OR day  4/3VSS seen by PT disposition to home  with PT dig loaded   4/4 VSS  deintensified  Right pigtail placed for pleural  effusion 555  Molina d/c voiding. transferred to floor losartan 25 per Dr Baig. ASA Plavix lopressor 25 bid atorvastatin  80 ISS  lantus 10  premeal 8   disposition to home   4/5 VSS; RSR 80--110; d/c right pigtail as per Dr. Baig this am; ck post removal chest xray; ck echo; change asa 81 qd  4/6 HD stable.  PTC 310cc/24 hrs--ambulate and observe thru day.  Lop changed to toprol. Lasix/aldactone added.  4/7 VSS, mag 1.8 supplemented. Left chest tube 230cc/24h, another 140cc this AM after ambulation. Keep left chest tube in today and monitor drainage, possible d/c in AM.   4/8 VSS, Left chest tube output 410cc/24h, placed to water seal per Dr. Malcolm. Admelog decreased to 8 units TID per Endo, insulin sent to [employer] to check for coverage.   4/9 VSS; d/c left ct; ck chest xray: small left ptx- d/w Dr. Malcolm-  discharge pt home today as per Dr. Malcolm ; insulin as per endo - semglee 12 unit sq qhs and admelog 6 unit ac q8

## 2021-04-09 NOTE — PROGRESS NOTE ADULT - PROBLEM SELECTOR PLAN 1
Goal glucose 100-180 mg/dl  FS AC & HS  if remains inpatient change Lantus to 12 units SQ qHS   if remains inpatient change Admelog to 6  units SQ TID before meals (Hold if NPO/not eating meal)  c/w Admelog correctional scale to low dose SQ before meals and low dose scale at bedtime  Consistent carbohydrate diet    Discharge Plan:  Admelog 6 units sq TID AC; Semglee 12 units sq qhs  Ensure she has supplies, including glucometer, glucose test strips, lancets, alcohol swabs and insulin PEN needles     Appointments made please add to discharge papers  5/24/21 Dietician 12pm  at [address] [place] [address]   6/15/2021 Dr Styles 2:15pm at [address] [place] [address]

## 2021-04-09 NOTE — DISCHARGE NOTE PROVIDER - CARE PROVIDER_API CALL
Jeremi Baig)  Surgery; Thoracic and Cardiac Surgery  [address] [place] [address]  Phone: [phone]  Fax: [phone]  Follow Up Time:     Marisol Seymour)  EndocrinologyMetabDiabetes; Internal Medicine  [address] [facility] [address] [facility] [address]  [place] [address]  Phone: [phone]  Fax: [phone]  Follow Up Time:

## 2021-04-09 NOTE — DISCHARGE NOTE PROVIDER - NSDCHHHOMEBOUND_GEN_ALL_CORE
Pt. with oliguric SAM in the setting of hypotension and COVID-19. Scr on admission (3/15/20) was 0.84, increased to 4.6 on 3/26/20. Pt. likely with ATN. Pt. was started on CRRT/CVVHDF on 3/26/20, discontinued on 4/9/20. Labs reviewed. No plan for CRRT/HD today. Will assess daily for CRRT/HD. Check renal sonogram (when feasible). Monitor labs and urine output. Avoid any potential nephrotoxins Other, specify...

## 2021-04-09 NOTE — DISCHARGE NOTE PROVIDER - NSDCPNSUBOBJ_GEN_ALL_CORE
PHYSICAL EXAM  Neurology: A&Ox3, NAD  CV : RRR+S1S2  Sternal Wound: MSI CDI ODESSA, Stable  Lungs: Respirations non-labored, B/L BS clear, diminished at bases  Abdomen: Soft, NT/ND, +BSx4Q, last BM 4/7 (-)N/V/D  : Voiding without difficulty  Extremities: B/L LE trace edema, negative calf tenderness, +PP, B/L SVG incision CDI       Discharge pt home today 4/9 as per Dr. Malcolm

## 2021-04-09 NOTE — PROGRESS NOTE ADULT - TIME BILLING
insulin regiment titration, discharge medication planning
insulin regimen titration
insulin regiment titration, discharge medication planning
yisel-operative glycemic control
insulin regimen titration

## 2021-04-09 NOTE — CHART NOTE - NSCHARTNOTEFT_GEN_A_CORE
Pt seen for request by pt to speak with RD re: DM diet/management upon d/c. Pt seen with spouse at bedside.    Pt is a 50 year old female with PMH T2DM (diagnosed 2005) and Hypertension (diagnosed 2005) p/w worsening shortness of breath and chest pain for 1 wk on 3/23. Due to insurance issues, was not able to take home Metformin. At [facility] ED, pt was found to be in DKA with glucose level in 600s. Pt was underwent cath on 3/25 revealed CAD. Is now s/p 4/2 CABG x 3 /LAABypass graft.   Pt followed by endocrine, planned for d/c today, will be d/c home on Semglee 12 units at bedtime, Admelog 6 units pre-meal per recent note.    CAPILLARY BLOOD GLUCOSE  POCT Blood Glucose.: 90 mg/dL (09 Apr 2021 11:50)  POCT Blood Glucose.: 129 mg/dL (09 Apr 2021 07:25)  POCT Blood Glucose.: 189 mg/dL (08 Apr 2021 21:19)  POCT Blood Glucose.: 190 mg/dL (08 Apr 2021 16:19)      Intervention:    1)     RD remains available: Angelina Dubose MS, RDN, CDN, CDE, CSOW. #[phone] Pt seen for request by pt to speak with RD/CDE re: DM diet/management upon d/c. Pt seen with spouse at bedside. Both pt and  decline free  phone services and feel comfortable receiving education in English.    Pt is a 50 year old female with PMH T2DM (diagnosed 2005) and Hypertension (diagnosed 2005) p/w worsening shortness of breath and chest pain for 1 wk on 3/23. Due to insurance issues, was not able to take home Metformin. At [facility] ED, pt was found to be in DKA with glucose level in 600s. Pt was underwent cath on 3/25 revealed CAD. Is now s/p 4/2 CABG x 3 /LAABypass graft.   Pt followed by endocrine, planned for d/c today, will be d/c home on Semglee 12 units at bedtime, Admelog 6 units pre-meal per recent note.    CAPILLARY BLOOD GLUCOSE  POCT Blood Glucose.: 90 mg/dL (09 Apr 2021 11:50)  POCT Blood Glucose.: 129 mg/dL (09 Apr 2021 07:25)  POCT Blood Glucose.: 189 mg/dL (08 Apr 2021 21:19)  POCT Blood Glucose.: 190 mg/dL (08 Apr 2021 16:19)    Intervention:    -Educated pt and  at bedside re: basic DM ket management points, including SMBG and goal FS ranges, hypoglycemia risk with new insulin use (including s/s of hypoglycemia, ways to help prevent, and treatment of hypoglycemia) consistent CHO Lacto-Ovo vegetarian diet, reviewed CHO food/beverage sources, My Plate style of meal portioning. reinforced importance of glycemic control at helping to prevent complications. Pt and spouse voiced understanding. Answered pt and spouse questions.    RD remains available: Angelina Dubose MS, RDN, CDN, CDE, [facility]. #[phone]

## 2021-04-09 NOTE — PROGRESS NOTE ADULT - SUBJECTIVE AND OBJECTIVE BOX
DIABETES FOLLOW UP : Seen earlier today    INTERVAL HX: patient states she is excited for her discharge today, she reports tolerating PO had eggs and coffee at breakfast today. BG at goal today.       Review of Systems:  General: As above  Cardiovascular: No chest pain, palpitations  Respiratory: No SOB, no cough  GI: No nausea, vomiting, abdominal pain  Endocrine: no polyuria, polydipsia or S&Sx of hypoglycemia    Allergies    No Known Allergies    MEDICATIONS  (STANDING):  aspirin enteric coated 81 milliGRAM(s) Oral daily  atorvastatin 80 milliGRAM(s) Oral at bedtime  clopidogrel Tablet 75 milliGRAM(s) Oral daily  enoxaparin Injectable 40 milliGRAM(s) SubCutaneous daily  furosemide    Tablet 20 milliGRAM(s) Oral daily  insulin glargine Injectable (LANTUS) 14 Unit(s) SubCutaneous at bedtime  insulin lispro (ADMELOG) corrective regimen sliding scale   SubCutaneous three times a day before meals  insulin lispro (ADMELOG) corrective regimen sliding scale   SubCutaneous at bedtime  insulin lispro Injectable (ADMELOG) 8 Unit(s) SubCutaneous three times a day before meals  losartan 25 milliGRAM(s) Oral at bedtime  metoprolol succinate ER 50 milliGRAM(s) Oral daily  pantoprazole    Tablet 40 milliGRAM(s) Oral before breakfast  polyethylene glycol 3350 17 Gram(s) Oral daily  sodium chloride 0.9% lock flush 3 milliLiter(s) IV Push every 8 hours  spironolactone 25 milliGRAM(s) Oral daily      PHYSICAL EXAM:  VITALS: T(C): 36.8 (04-09-21 @ 12:52)  T(F): 98.2 (04-09-21 @ 12:52), Max: 98.2 (04-08-21 @ 19:27)  HR: 75 (04-09-21 @ 12:52) (75 - 95)  BP: 94/53 (04-09-21 @ 12:52) (94/53 - 138/76)  RR:  (18 - 18)  SpO2:  (97% - 99%)  Wt(kg): --  GENERAL: female sitting in bed in NAD  RESPIRATORY: Clear to auscultation bilaterally; No rales, rhonchi, wheezing, or rubs  CARDIOVASCULAR: Regular rate and rhythm; No murmurs; no peripheral edema extremities warm/dry; anterior sternal incision cdi  GI: Soft, nontender, non distended, normal bowel sounds    LABS:  POCT Blood Glucose.: 90 mg/dL (04-09-21 @ 11:50)  POCT Blood Glucose.: 129 mg/dL (04-09-21 @ 07:25)  POCT Blood Glucose.: 189 mg/dL (04-08-21 @ 21:19)  POCT Blood Glucose.: 190 mg/dL (04-08-21 @ 16:19)  POCT Blood Glucose.: 101 mg/dL (04-08-21 @ 11:33)  POCT Blood Glucose.: 124 mg/dL (04-08-21 @ 07:51)  POCT Blood Glucose.: 129 mg/dL (04-07-21 @ 21:02)  POCT Blood Glucose.: 77 mg/dL (04-07-21 @ 16:38)  POCT Blood Glucose.: 145 mg/dL (04-07-21 @ 11:30)  POCT Blood Glucose.: 186 mg/dL (04-07-21 @ 07:31)  POCT Blood Glucose.: 190 mg/dL (04-06-21 @ 21:49)  POCT Blood Glucose.: 196 mg/dL (04-06-21 @ 16:43)                            9.7    10.12 )-----------( 356      ( 09 Apr 2021 05:34 )             30.0       04-09    141  |  107  |  9   ----------------------------<  134<H>  4.2   |  21<L>  |  0.57    EGFR if non : 109    Ca    8.8      04-09  Mg     1.9     04-09    TPro  5.8<L>  /  Alb  2.8<L>  /  TBili  0.4  /  DBili  x   /  AST  13  /  ALT  8<L>  /  AlkPhos  60  04-09 03-24 Chol 198  HDL 63 Trig 89    Thyroid Function Tests:  03-30 @ 03:59 TSH 5.37   03-23 @ 14:38 TSH 1.77       A1C with Estimated Average Glucose Result: 13.9 % (03-24-21 @ 00:08)  A1C with Estimated Average Glucose Result: 13.9 % (03-23-21 @ 15:35)      Estimated Average Glucose: 352 mg/dL (03-24-21 @ 00:08)  Estimated Average Glucose: 352 mg/dL (03-23-21 @ 15:35)

## 2021-04-09 NOTE — PROGRESS NOTE ADULT - PROVIDER SPECIALTY LIST ADULT
Critical Care
Endocrinology
CT Surgery
CT Surgery
Critical Care
Endocrinology
CT Surgery
Critical Care
Endocrinology
CT Surgery
Endocrinology
CT Surgery
Endocrinology
Endocrinology

## 2021-04-09 NOTE — PROGRESS NOTE ADULT - NUTRITIONAL ASSESSMENT
Diet, Regular:   Consistent Carbohydrate {No Snacks} (CSTCHO)  Low Sodium  Lacto-Ovo Veg (Accepts Milk Prod., Eggs) (04-03-21 @ 17:33) [Active]

## 2021-04-09 NOTE — PROGRESS NOTE ADULT - SUBJECTIVE AND OBJECTIVE BOX
VITAL SIGNS    Telemetry:    Vital Signs Last 24 Hrs  T(C): 36 (04-09-21 @ 05:29), Max: 36.8 (04-08-21 @ 19:27)  T(F): 96.8 (04-09-21 @ 05:29), Max: 98.2 (04-08-21 @ 19:27)  HR: 95 (04-09-21 @ 05:29) (88 - 95)  BP: 101/59 (04-09-21 @ 05:29) (101/59 - 138/76)  RR: 18 (04-09-21 @ 05:29) (18 - 18)  SpO2: 97% (04-09-21 @ 05:29) (97% - 99%)            04-08 @ 07:01  -  04-09 @ 07:00  --------------------------------------------------------  IN: 910 mL / OUT: 2395 mL / NET: -1485 mL       Daily     Daily   Admit Wt: Drug Dosing Weight  Height (cm): 160 (30 Mar 2021 05:57)  Weight (kg): 55.7 (01 Apr 2021 21:10)  BMI (kg/m2): 21.8 (01 Apr 2021 21:10)  BSA (m2): 1.57 (01 Apr 2021 21:10)    Bilirubin Total, Serum: 0.4 mg/dL (04-09 @ 05:34)    CAPILLARY BLOOD GLUCOSE      POCT Blood Glucose.: 129 mg/dL (09 Apr 2021 07:25)  POCT Blood Glucose.: 189 mg/dL (08 Apr 2021 21:19)  POCT Blood Glucose.: 190 mg/dL (08 Apr 2021 16:19)  POCT Blood Glucose.: 101 mg/dL (08 Apr 2021 11:33)  POCT Blood Glucose.: 124 mg/dL (08 Apr 2021 07:51)          acetaminophen   Tablet .. 650 milliGRAM(s) Oral every 6 hours PRN  aspirin enteric coated 81 milliGRAM(s) Oral daily  atorvastatin 80 milliGRAM(s) Oral at bedtime  clopidogrel Tablet 75 milliGRAM(s) Oral daily  enoxaparin Injectable 40 milliGRAM(s) SubCutaneous daily  furosemide    Tablet 20 milliGRAM(s) Oral daily  insulin glargine Injectable (LANTUS) 14 Unit(s) SubCutaneous at bedtime  insulin lispro (ADMELOG) corrective regimen sliding scale   SubCutaneous three times a day before meals  insulin lispro (ADMELOG) corrective regimen sliding scale   SubCutaneous at bedtime  insulin lispro Injectable (ADMELOG) 8 Unit(s) SubCutaneous three times a day before meals  losartan 25 milliGRAM(s) Oral at bedtime  metoprolol succinate ER 50 milliGRAM(s) Oral daily  oxyCODONE    IR 5 milliGRAM(s) Oral every 4 hours PRN  pantoprazole    Tablet 40 milliGRAM(s) Oral before breakfast  polyethylene glycol 3350 17 Gram(s) Oral daily  sodium chloride 0.9% lock flush 3 milliLiter(s) IV Push every 8 hours  spironolactone 25 milliGRAM(s) Oral daily      PHYSICAL EXAM    Subjective: "Hi.   Neurology: alert and oriented x 3, nonfocal, no gross deficits  CV : tele:  RSR  Sternal Wound :  CDI with dressing , Stable  Lungs: clear. RR easy, unlabored   Abdomen: soft, nontender, nondistended, positive bowel sounds, bowel movement   Neg N/V/D   :  pt voiding without difficulty   Extremities:   M[place]A; edema, neg calf tenderness.   PPP bilaterally      PW:  Chest tubes:

## 2021-04-09 NOTE — PROGRESS NOTE ADULT - ASSESSMENT
50 year old woman with PMH HTN, HLD, uncontrolled DM2 with CAD, p/w worsening shortness of breath and chest pain for 1 week, admitted for management of acute CHF as well as hyperglycemia the 600's ,s/p CABG x3 on 4/2 with CTICU admission & insulin gtt post op, endocrine consult called for management of uncontrolled DM2, A1c is 13.9%. BG Very tightly controlled. Lowered nutritional insulin for discharge prevent hypoglycemia. Lower basal dose at discharge . BG at goal 100-180mg/dl     Discussed discharge planning with patient: Admelog 6 units before each meal; Semglee 12 units qhs

## 2021-04-09 NOTE — DISCHARGE NOTE PROVIDER - NSDCFUADDINST_GEN_ALL_CORE_FT
no driving until cleared by Dr. Baig  refer to cardiac surgery do and don't checklist  call Dr. Baig for fever > 101  check blood sugar levels before meals and at bedtime- record levels

## 2021-04-09 NOTE — PROGRESS NOTE ADULT - PROBLEM SELECTOR PROBLEM 3
Hyperlipidemia, unspecified hyperlipidemia type
Pleural effusion on right
Hyperlipidemia, unspecified hyperlipidemia type

## 2021-04-09 NOTE — PROGRESS NOTE ADULT - PROBLEM SELECTOR PLAN 2
House endo following  diabetic diet  accuchecks ac and hs  Continue Lantus 14 HS and admelog 8 TID pre-meals   D/C home on Semglee 14 units HS and Admelog 8 units TID per endocrine - meds sent to [facility]  F/u with outpatient endocrine Dr. Mathews

## 2021-04-09 NOTE — PROGRESS NOTE ADULT - ASSESSMENT
49y/o Smooth Female with pmhx of T2DM (diagnosed 2005) and Hypertension (diagnosed 2005) p/w worsening shortness of breath and chest pain for 1 wk on 3/23. Due to insurance issues, was not able to take home metformin. At [facility] ED, pt was found to be in DKA with glucose level in 600s, proBNP 2107, trop 111 with ST depression in V5/V6. Endo was consulted  Pt was given Lasix IV push and admitted to CCU on bipap and nitro gtt. TTE done on 3/24 showed EF 35-40%, moderate to severe LV systolic dysfunction, apical thrombus seen, and trace pericardial effusion, and b/l pleural effusions. Pt was underwent cath on 3/25. LHC/RHC 3/25 showed PCW 30, CO 3.94, CI 2.42, pLAD 80% stenosis, Mid LAD 60% stenosis, Mid circumflex 70%, RCA 75% stenosis, RPDA 70% stenosis, RPLS 60% stenosis at ostium.   Patient underwent cardiac viability study 3/29/21, results reviewed.  Patient was subsequently transferred to [facility] for Preop CABG evaluation with Dr. Baig. Cardiac surgery workup completed.  4/2  Underwent CABG 3 /LEANA Bypass graft,post op course includes  fluid resuscitation inotropic support insulin gtt for  hyperglycemia  Extubated OR day  4/3VSS seen by PT disposition to home  with PT dig loaded   4/4 VSS  deintensified  Right pigtail placed for pleural  effusion 555  [place] d/c voiding. transferred to floor losartan 25 per Dr Baig. ASA Plavix lopressor 25 bid atorvastatin  80 ISS  lantus 10  premeal 8   disposition to home   4/5 VSS; RSR 80--110; d/c right pigtail as per Dr. Baig this am; ck post removal chest xray; ck echo; change asa 81 qd  4/6 HD stable.  PTC 310cc/24 hrs--ambulate and observe thru day.  Lop changed to toprol. Lasix/aldactone added.  4/7 VSS, mag 1.8 supplemented. Left chest tube 230cc/24h, another 140cc this AM after ambulation. Keep left chest tube in today and monitor drainage, possible d/c in AM.   4/8 VSS, Left chest tube output 410cc/24h, placed to water seal per Dr. Malcolm. Admelog decreased to 8 units TID per Endo, insulin sent to [facility] to check for coverage.

## 2021-04-09 NOTE — PROGRESS NOTE ADULT - NSICDXPILOT_GEN_ALL_CORE
[place]

## 2021-04-09 NOTE — DISCHARGE NOTE PROVIDER - NSDCCPCAREPLAN_GEN_ALL_CORE_FT
PRINCIPAL DISCHARGE DIAGNOSIS  Diagnosis: S/P CABG x 3  Assessment and Plan of Treatment: shower daily  weigh yourself daily  continue current prescriptions as ordered  increase activity as tolerated   no added salt; low fat; low cholesterol, low salt diabetic diet.   follow up with Cardiologist in 1-2 weeks. Call to schedule appointment.  follow up with cardiac surgeon, Dr. Baig          PRINCIPAL DISCHARGE DIAGNOSIS  Diagnosis: S/P CABG x 3  Assessment and Plan of Treatment: shower daily  weigh yourself daily  continue current prescriptions as ordered  increase activity as tolerated   no added salt; low fat; low cholesterol, low salt diabetic diet.   check blood sugar levels before meals and at bedtime- record levels   follow up with Cardiologist in 1-2 weeks. Call to schedule appointment.  follow up with cardiac surgeon, Dr. Baig, On April 19th at 10:30 am. Call to confirm appointment. [phone]  followup with endocrinologist, Dr. Seymour, in 1-2 weeks. CAll to schedule appointment.

## 2021-04-10 ENCOUNTER — TRANSCRIPTION ENCOUNTER (OUTPATIENT)
Age: 51
End: 2021-04-10

## 2021-04-12 ENCOUNTER — TRANSCRIPTION ENCOUNTER (OUTPATIENT)
Age: 51
End: 2021-04-12

## 2021-04-12 ENCOUNTER — NON-APPOINTMENT (OUTPATIENT)
Age: 51
End: 2021-04-12

## 2021-04-14 ENCOUNTER — APPOINTMENT (OUTPATIENT)
Dept: CARE COORDINATION | Facility: HOME HEALTH | Age: 51
End: 2021-04-14
Payer: MEDICAID

## 2021-04-14 PROCEDURE — 99024 POSTOP FOLLOW-UP VISIT: CPT

## 2021-04-15 VITALS
DIASTOLIC BLOOD PRESSURE: 60 MMHG | RESPIRATION RATE: 14 BRPM | OXYGEN SATURATION: 95 % | HEART RATE: 60 BPM | SYSTOLIC BLOOD PRESSURE: 98 MMHG

## 2021-04-15 RX ORDER — DEXAMETHASONE 4 MG/1
4 TABLET ORAL
Qty: 10 | Refills: 0 | Status: DISCONTINUED | COMMUNITY
Start: 2021-03-22

## 2021-04-15 RX ORDER — BLOOD SUGAR DIAGNOSTIC
STRIP MISCELLANEOUS
Qty: 100 | Refills: 0 | Status: ACTIVE | COMMUNITY
Start: 2021-04-08

## 2021-04-15 RX ORDER — PEN NEEDLE, DIABETIC 31 G X1/4"
32G X 4 MM NEEDLE, DISPOSABLE MISCELLANEOUS
Qty: 100 | Refills: 0 | Status: ACTIVE | COMMUNITY
Start: 2021-04-08

## 2021-04-15 RX ORDER — METOPROLOL SUCCINATE 50 MG/1
50 TABLET, EXTENDED RELEASE ORAL
Qty: 90 | Refills: 2 | Status: ACTIVE | COMMUNITY

## 2021-04-15 RX ORDER — LORATADINE 5 MG
17 TABLET,CHEWABLE ORAL DAILY
Refills: 0 | Status: ACTIVE | COMMUNITY

## 2021-04-15 RX ORDER — PANTOPRAZOLE 40 MG/1
40 TABLET, DELAYED RELEASE ORAL DAILY
Qty: 14 | Refills: 0 | Status: ACTIVE | COMMUNITY

## 2021-04-15 RX ORDER — AZITHROMYCIN 250 MG/1
250 TABLET, FILM COATED ORAL
Qty: 6 | Refills: 0 | Status: DISCONTINUED | COMMUNITY
Start: 2021-03-15

## 2021-04-15 RX ORDER — 70%ISOPROPYL ALCOHOL 0.7 ML/ML
70 SWAB TOPICAL
Qty: 100 | Refills: 0 | Status: ACTIVE | COMMUNITY
Start: 2021-04-08

## 2021-04-15 RX ORDER — LOSARTAN POTASSIUM 25 MG/1
25 TABLET, FILM COATED ORAL DAILY
Qty: 90 | Refills: 0 | Status: ACTIVE | COMMUNITY

## 2021-04-15 RX ORDER — BLOOD-GLUCOSE METER
KIT MISCELLANEOUS
Qty: 1 | Refills: 0 | Status: ACTIVE | COMMUNITY
Start: 2021-04-08

## 2021-04-15 RX ORDER — ATORVASTATIN CALCIUM 80 MG/1
80 TABLET, FILM COATED ORAL
Qty: 30 | Refills: 0 | Status: ACTIVE | COMMUNITY

## 2021-04-15 RX ORDER — LANCETS 28 GAUGE
EACH MISCELLANEOUS
Qty: 100 | Refills: 0 | Status: ACTIVE | COMMUNITY
Start: 2021-04-09

## 2021-04-15 RX ORDER — CLOPIDOGREL 75 MG/1
75 TABLET, FILM COATED ORAL DAILY
Refills: 0 | Status: ACTIVE | COMMUNITY

## 2021-04-15 RX ORDER — INSULIN LISPRO 100 U/ML
100 INJECTION, SOLUTION SUBCUTANEOUS
Refills: 0 | Status: ACTIVE | COMMUNITY

## 2021-04-15 RX ORDER — DEXTROMETHORPHAN HBR, GUAIFENESIN 20; 200 MG/10ML; MG/10ML
10-100 SOLUTION ORAL
Qty: 237 | Refills: 0 | Status: DISCONTINUED | COMMUNITY
Start: 2021-03-15

## 2021-04-15 RX ORDER — OXYCODONE 5 MG/1
5 TABLET ORAL EVERY 6 HOURS
Qty: 20 | Refills: 0 | Status: ACTIVE | COMMUNITY

## 2021-04-15 RX ORDER — INSULIN GLARGINE 100 [IU]/ML
100 INJECTION, SOLUTION SUBCUTANEOUS
Refills: 0 | Status: ACTIVE | COMMUNITY

## 2021-04-15 RX ORDER — SPIRONOLACTONE 25 MG/1
25 TABLET ORAL DAILY
Qty: 7 | Refills: 0 | Status: ACTIVE | COMMUNITY

## 2021-04-15 NOTE — HISTORY OF PRESENT ILLNESS
[FreeTextEntry1] : FOLLOW YOUR HEART HOME VISIT-[employer]\par Home Visit made Gordon IRVIN ] .  A patient of Dr Baig .   S/P  CABGx3 on 4/2. Discharged from [facility]\par \par \par Visiting patient for post discharge transitional care management and post surgical follow up. \par Arrived to pt home, she is accompanied by her  and family.  She is doing well, looks well.  Denies pain \par Smooth speaking but is able to communicate in english with  translating as well.\par

## 2021-04-15 NOTE — PHYSICAL EXAM
[Sclera] : the sclera and conjunctiva were normal [PERRL With Normal Accommodation] : pupils were equal in size, round, and reactive to light [Neck Appearance] : the appearance of the neck was normal [] : no respiratory distress [Bibasilar Rales/Crackles] : bibasilar rales [Apical Impulse] : the apical impulse was normal [Heart Sounds] : normal S1 and S2 [1+] : left 1+ [Bowel Sounds] : normal bowel sounds [Abdomen Soft] : soft [Abnormal Walk] : normal gait [Skin Color & Pigmentation] : normal skin color and pigmentation [FreeTextEntry1] : MTI- approximated, R/L inner knee harvest  [No Focal Deficits] : no focal deficits [Oriented To Time, Place, And Person] : oriented to person, place, and time

## 2021-04-15 NOTE — REASON FOR VISIT
Endocrinology Endocrinology Endocrinology Internal Medicine Internal Medicine Internal Medicine Nephrology Nephrology Nephrology Endocrinology Endocrinology Endocrinology Endocrinology Endocrinology Nephrology Nephrology Endocrinology Nephrology Pulmonology Nephrology Nephrology Nephrology Nephrology Nephrology Pulmonology Pulmonology Nephrology Nephrology Nephrology Nephrology Nephrology Nephrology Nephrology Internal Medicine Internal Medicine Internal Medicine Endocrinology Internal Medicine Internal Medicine Internal Medicine Internal Medicine Endocrinology Internal Medicine Internal Medicine [Post Hospitalization] : a post hospitalization visit Internal Medicine [Spouse] : spouse Internal Medicine Internal Medicine Internal Medicine Internal Medicine Internal Medicine Internal Medicine Internal Medicine Internal Medicine

## 2021-04-15 NOTE — ASSESSMENT
[FreeTextEntry1] : 51y/o Smooth Female with pmhx of T2DM (diagnosed 2005) and Hypertension (diagnosed 2005) \par EF 10-20% pre op-cardiac viability study 3/29/21> viable tissue\par On 4/2  Underwent CABG 3 /LEANA clip with Dr Baig

## 2021-04-16 PROBLEM — Z98.890 S/P LEFT ATRIAL APPENDAGE LIGATION: Status: ACTIVE | Noted: 2021-04-09

## 2021-04-16 PROBLEM — Z95.1 S/P CABG X 3: Status: ACTIVE | Noted: 2021-04-09

## 2021-04-16 PROBLEM — Z09 POSTOPERATIVE FOLLOW-UP: Status: ACTIVE | Noted: 2021-04-09

## 2021-04-19 ENCOUNTER — APPOINTMENT (OUTPATIENT)
Dept: CARDIOTHORACIC SURGERY | Facility: CLINIC | Age: 51
End: 2021-04-19
Payer: MEDICAID

## 2021-04-19 ENCOUNTER — NON-APPOINTMENT (OUTPATIENT)
Age: 51
End: 2021-04-19

## 2021-04-19 VITALS
TEMPERATURE: 98.3 F | WEIGHT: 121 LBS | DIASTOLIC BLOOD PRESSURE: 67 MMHG | RESPIRATION RATE: 16 BRPM | SYSTOLIC BLOOD PRESSURE: 91 MMHG | OXYGEN SATURATION: 98 % | HEIGHT: 63 IN | BODY MASS INDEX: 21.44 KG/M2 | HEART RATE: 91 BPM

## 2021-04-19 DIAGNOSIS — Z95.1 PRESENCE OF AORTOCORONARY BYPASS GRAFT: ICD-10-CM

## 2021-04-19 DIAGNOSIS — Z98.890 OTHER SPECIFIED POSTPROCEDURAL STATES: ICD-10-CM

## 2021-04-19 DIAGNOSIS — Z09 ENCOUNTER FOR FOLLOW-UP EXAMINATION AFTER COMPLETED TREATMENT FOR CONDITIONS OTHER THAN MALIGNANT NEOPLASM: ICD-10-CM

## 2021-04-19 PROCEDURE — 93000 ELECTROCARDIOGRAM COMPLETE: CPT

## 2021-04-19 PROCEDURE — 99024 POSTOP FOLLOW-UP VISIT: CPT

## 2021-04-26 ENCOUNTER — APPOINTMENT (OUTPATIENT)
Dept: CARDIOLOGY | Facility: CLINIC | Age: 51
End: 2021-04-26
Payer: MEDICAID

## 2021-04-26 ENCOUNTER — NON-APPOINTMENT (OUTPATIENT)
Age: 51
End: 2021-04-26

## 2021-04-26 VITALS
HEIGHT: 63 IN | BODY MASS INDEX: 21.79 KG/M2 | OXYGEN SATURATION: 100 % | DIASTOLIC BLOOD PRESSURE: 84 MMHG | WEIGHT: 123 LBS | SYSTOLIC BLOOD PRESSURE: 120 MMHG | HEART RATE: 99 BPM

## 2021-04-26 DIAGNOSIS — Z86.39 PERSONAL HISTORY OF OTHER ENDOCRINE, NUTRITIONAL AND METABOLIC DISEASE: ICD-10-CM

## 2021-04-26 DIAGNOSIS — Z78.9 OTHER SPECIFIED HEALTH STATUS: ICD-10-CM

## 2021-04-26 PROCEDURE — 99204 OFFICE O/P NEW MOD 45 MIN: CPT

## 2021-04-26 PROCEDURE — 93000 ELECTROCARDIOGRAM COMPLETE: CPT

## 2021-04-26 RX ORDER — FUROSEMIDE 20 MG/1
20 TABLET ORAL
Qty: 3 | Refills: 0 | Status: DISCONTINUED | COMMUNITY
End: 2021-04-26

## 2021-05-07 ENCOUNTER — TRANSCRIPTION ENCOUNTER (OUTPATIENT)
Age: 51
End: 2021-05-07

## 2021-05-23 NOTE — HISTORY OF PRESENT ILLNESS
[FreeTextEntry1] : 50 year old female with h/o DM, HTN, HLD, CAD s/p CABG recently.  Pt feeling well with some mild chest soreness but no sx of CP, SOB or h/A

## 2021-05-23 NOTE — DISCUSSION/SUMMARY
[FreeTextEntry1] : CAD- s/p CABG doing well\par \par HTN- controlled\par \par HLD- stable on statin\par \par Followup in 2 mths

## 2021-05-24 ENCOUNTER — APPOINTMENT (OUTPATIENT)
Dept: ENDOCRINOLOGY | Facility: CLINIC | Age: 51
End: 2021-05-24

## 2021-06-15 ENCOUNTER — APPOINTMENT (OUTPATIENT)
Dept: ENDOCRINOLOGY | Facility: CLINIC | Age: 51
End: 2021-06-15

## 2021-06-15 ENCOUNTER — APPOINTMENT (OUTPATIENT)
Dept: ENDOCRINOLOGY | Facility: CLINIC | Age: 51
End: 2021-06-15
Payer: MEDICAID

## 2021-06-15 VITALS — WEIGHT: 124 LBS | BODY MASS INDEX: 21.97 KG/M2 | HEIGHT: 63 IN

## 2021-06-15 DIAGNOSIS — E11.9 TYPE 2 DIABETES MELLITUS W/OUT COMPLICATIONS: ICD-10-CM

## 2021-06-15 PROCEDURE — G0108 DIAB MANAGE TRN  PER INDIV: CPT

## 2021-06-25 ENCOUNTER — NON-APPOINTMENT (OUTPATIENT)
Age: 51
End: 2021-06-25

## 2021-06-25 ENCOUNTER — APPOINTMENT (OUTPATIENT)
Dept: CARDIOLOGY | Facility: CLINIC | Age: 51
End: 2021-06-25
Payer: MEDICAID

## 2021-06-25 VITALS
HEIGHT: 63 IN | DIASTOLIC BLOOD PRESSURE: 68 MMHG | HEART RATE: 99 BPM | OXYGEN SATURATION: 100 % | SYSTOLIC BLOOD PRESSURE: 97 MMHG

## 2021-06-25 DIAGNOSIS — E78.5 HYPERLIPIDEMIA, UNSPECIFIED: ICD-10-CM

## 2021-06-25 DIAGNOSIS — I10 ESSENTIAL (PRIMARY) HYPERTENSION: ICD-10-CM

## 2021-06-25 DIAGNOSIS — I25.10 ATHEROSCLEROTIC HEART DISEASE OF NATIVE CORONARY ARTERY W/OUT ANGINA PECTORIS: ICD-10-CM

## 2021-06-25 PROCEDURE — 93000 ELECTROCARDIOGRAM COMPLETE: CPT

## 2021-06-25 PROCEDURE — 99214 OFFICE O/P EST MOD 30 MIN: CPT

## 2021-07-05 PROBLEM — I25.10 CORONARY ARTERY DISEASE: Status: ACTIVE | Noted: 2021-04-22

## 2021-07-05 PROBLEM — I10 HYPERTENSION: Status: ACTIVE | Noted: 2021-04-26

## 2021-07-05 PROBLEM — E78.5 HYPERLIPEMIA: Status: ACTIVE | Noted: 2021-04-26

## 2021-07-05 NOTE — DISCUSSION/SUMMARY
[FreeTextEntry1] : CAD- s/p CABG doing well\par \par HTN- controlled\par \par HLD- stable on statin\par \par Followup in 4 mths\par \par Time spent reviewing histiry, CTS notes, exam, pt discussion and note writing

## 2021-07-05 NOTE — PHYSICAL EXAM
[Well Developed] : well developed [Well Nourished] : well nourished [No Acute Distress] : no acute distress [Normal Venous Pressure] : normal venous pressure [Normal Conjunctiva] : normal conjunctiva [No Carotid Bruit] : no carotid bruit [Normal S1, S2] : normal S1, S2 [No Murmur] : no murmur [No Rub] : no rub [No Gallop] : no gallop [Clear Lung Fields] : clear lung fields [Good Air Entry] : good air entry [No Respiratory Distress] : no respiratory distress  [Soft] : abdomen soft [Non Tender] : non-tender [No Masses/organomegaly] : no masses/organomegaly [Normal Bowel Sounds] : normal bowel sounds [Normal Gait] : normal gait [No Edema] : no edema [No Cyanosis] : no cyanosis [No Clubbing] : no clubbing [No Varicosities] : no varicosities [No Rash] : no rash [No Skin Lesions] : no skin lesions [No Focal Deficits] : no focal deficits [Moves all extremities] : moves all extremities [Normal Speech] : normal speech [Alert and Oriented] : alert and oriented [Normal memory] : normal memory

## 2021-08-27 RX ORDER — METFORMIN HYDROCHLORIDE 500 MG/1
500 TABLET, COATED ORAL TWICE DAILY
Qty: 360 | Refills: 0 | Status: ACTIVE | COMMUNITY
Start: 2021-06-15

## 2021-12-10 ENCOUNTER — APPOINTMENT (OUTPATIENT)
Dept: CARDIOLOGY | Facility: CLINIC | Age: 51
End: 2021-12-10

## 2022-03-18 ENCOUNTER — APPOINTMENT (OUTPATIENT)
Dept: ENDOCRINOLOGY | Facility: CLINIC | Age: 52
End: 2022-03-18

## 2022-04-25 NOTE — DIETITIAN INITIAL EVALUATION ADULT. - ORAL INTAKE PTA/DIET HISTORY
The resident documentation has been  personally reviewed by me in its entirety. I confirm that the note above accurately reflects all work, treatment, procedures, and medical decision that has been discussed. Patient reports good appetite/PO intake prior to admission. Diet includes: roti, eggs, dairy, tofu, legumes/beans, mills, dahl, rice, fruits and vegetables. Mainly consumes traditional [place] meals. Follows Lacto-Ovo vegetarian diet. Avoids soda and juice. When drinking tea patient does not use sugar. HbA1c 13.9%. Only checks Finger sticks "sometime" and reports not taking any medications for DM2 for >6 months.

## 2023-05-25 NOTE — ED PROVIDER NOTE - NS ED MD DISPO ADMITTING SERVICE
I would like to see the patient tomorrow, Friday, May 26th, at 1:30 p.m. in the afternoon in a 30 minute time slot for evaluation and management of this worsening rash.   CCU

## 2023-11-16 NOTE — CONSULT NOTE ADULT - PROBLEM SELECTOR RECOMMENDATION 9
CHIEF COMPLAINT:   Followup multiple problems.   HISTORY OF PRESENT ILLNESS:   The patient is a 68-year-old very pleasant female who is here to follow up her multiple problems.   1. For the patient's dyslipidemia, the patient is taking her medication. Does not have any new muscle or joint pain. No abdominal pain. No nausea or vomiting. No yellowish discoloration of the skin or sclerae.   2. For the patient's hypertension, the patient is taking her medication. Does not have any nasal bleeding. No headache. No chest pain or   shortness of breath. No dizziness or vertigo. No urinary symptoms, no dysuria or hematuria.   3. For the patient's gout, the patient is taking her medication. Does not have any acute gouty arthritis currently.   4. For the patient's arthritis   the patient still has right hip pain but it is getting worse lately and this is going on for many years  5. GERD the patient is taking her medication does not have any difficulty swallowing or pain with swallowing  6. Impaired fasting glucose the patient is watching her diet the have any polyuria polydipsia no numbness or tingling of upper lower extremities.            PAST MEDICAL HISTORY:   1. Hypertension.   2. Arthritis and bursitis of the right hip.   3. Chronic low back pain.   4. Gout.   PAST SURGICAL HISTORY:   1. Tubal ligation.   2. Breast biopsy.   SOCIAL HISTORY:   The patient is not a smoker, socially drinks alcohol, not abusing any   drugs. She is , and she has 2 children.   FAMILY HISTORY:   Father with lung cancer, emphysema and aortic aneurysm. Mother has gout.   MEDICATIONS AND ALLERGIES:   Reviewed and updated per Epic      REVIEW OF SYSTEMS:   GENERAL:  Patient denies fever, chills, tiredness, malaise, weight loss, weight gain.  EYES:  Patient denies blurred vision, double vision, pain, burning and itching.   NEUROLOGIC:  Patient denies tremors, dizzy spells, numbness, tingling, vision change, loss of balance.  ENDOCRINE:   Patient denies excessive thirst, heat intolerance, lymph node enlargement, tired/sluggishness.  GASTROINTESTINAL:  Patient denies abdominal pain, nausea/vomiting, indigestion/heartburn, diarrhea, constipation.  CARDIOVASCULAR:  Patient denies chest pain, shortness of breath, palpitations.  SKIN:  Patient denies skin rashes, boils, persistent itching, acne.  MUSCULOSKELETAL:  Patient denies joint pain, neck pain, back pain, leg swelling.  Right hip pain please see H PI  ENT/MOUTH:  Patient denies ear infections, sore throats, sinus problems, hearing loss.  GENITOURINARY:  Patient denies urine retention, painful urination, urinary frequency, blood in urine, nocturia.  RESPIRATORY:  Patient denies wheezing, frequent cough, shortness of breath.        Physical Examination:  VITALS:  Blood pressure 100/64, pulse 61, temperature 98 °F (36.7 °C), temperature source Oral, resp. rate 16, height 5' 5\" (1.651 m), weight 71.3 kg (157 lb 1.6 oz).           GENERAL:  The patient is alert and oriented x3, and in no acute distress.  HEENT:  Normocephalic, atraumatic.  Normal conjunctivae and lids.  No  periorbital edema.  Extraocular movements intact bilaterally.  Pupils and  irises equal and react to light and accommodation.  Normal inspection of  ears and nose.  Tympanic membranes visualized bilaterally and normal in  appearance.  Right cerumen impaction  Nasal mucosa moist and pink.  Throat is nonerythematous, no  exudates or postnasal drip.  Tongue midline.  Uvula elevates in the midline.  NECK:  No neck or supraclavicular lymphadenopathy.  No thyromegaly.  No  jugular venous distention or bruits.  No stiffness noted.  LUNGS:  Clear to auscultation.  No wheezes or rhonchi.  Normal respiratory effort.  HEART:  Regular rate and rhythm.  No murmurs, rubs or gallops.  Normal S1 and S2.  ABDOMEN:  Soft, nontender to palpation.  Bowel sounds present.  No  hepatosplenomegaly.  No hernias present.  BACK:  No spinal or costovertebral  angle tenderness.  Normal range of  motion.  EXTREMITIES:  No clubbing, cyanosis or edema.  Bilateral upper and lower  extremities have normal range of motion.  Right hip there is decreased abduction and internal rotation  NEUROLOGIC:  Speech clear, memory intact.  Cranial nerves 2-12 grossly  intact.  Deep tendon reflexes positive throughout the body.  Babinski  downgoing.  Gait normal.  Exam of sensation is positive.  SKIN:  No rash, ecchymosis or jaundice.         Cholesterol (mg/dL)   Date Value   11/08/2023 155     HDL (mg/dL)   Date Value   11/08/2023 45 (L)     Cholesterol/ HDL Ratio (no units)   Date Value   11/08/2023 3.4     Triglycerides (mg/dL)   Date Value   11/08/2023 144     LDL (mg/dL)   Date Value   11/08/2023 81     Your hemoglobin A1C is: Last Lab A1C:  Hemoglobin A1C (%)   Date Value   11/08/2023 5.3       Last Point of Care A1C:  No results found for: \"FIIHLXYD9S\".        Sodium (mmol/L)   Date Value   11/08/2023 144     Potassium (mmol/L)   Date Value   11/08/2023 3.2 (L)     Chloride (mmol/L)   Date Value   11/08/2023 107     Glucose (mg/dL)   Date Value   11/08/2023 87     Calcium (mg/dL)   Date Value   11/08/2023 8.5     Carbon Dioxide (mmol/L)   Date Value   11/08/2023 32     BUN (mg/dL)   Date Value   11/08/2023 10     Creatinine (mg/dL)   Date Value   11/08/2023 0.65     WBC (K/mcL)   Date Value   11/08/2023 6.1     RBC (mil/mcL)   Date Value   11/08/2023 4.96     HCT (%)   Date Value   11/08/2023 42.5     HGB (g/dL)   Date Value   11/08/2023 14.2     PLT (K/mcL)   Date Value   11/08/2023 287       ASSESSMENT AND PLAN:   The patient is a 68-year-old female with:   1. For the patient's hypertension, well controlled, continue with the same medication.   2. Dyslipidemia. Cholesterol is  well controlled continue with the same medication.   3. Gouty arthritis, well controlled, continue with the same medication.   4. Arthritis, well controlled, continue with the same medication. I will see the  patient again after her lab work.    5. Impaired fasting glucose the patient hemoglobin A1c    well-controlled  we will continue to monitor  6. GERD stable and well controlled continue the same medication  7. Angioedema and allergies the patient is taking her medication and it is stable the patient's follow-up with Allergy specialist  8.    right hip pain I ordered an x-ray I consult Orthopedic  9. Obesity/overweight the patient BMI 26  the patient was using Ozempic which helped her to lose a lot of weight recommend the patient to continue diet and exercise without Ozempic which is not covered  10. Hypokalemia the patient's potassium is low recommend the patient to take potassium supplement    MEDICARE WELLNESS VISIT NOTE    HISTORY OF PRESENT ILLNESS:   Angy Rasmussen presents for her Subsequent Annual Medicare Wellness Visit.   She has no current complaints or concerns.      Patient Care Team:  Gregorio Molina MD as PCP - General (Family Practice)        Patient Active Problem List   Diagnosis   • Hypertension   • Personal history of colonic polyps   • Arthritis   • Dyslipidemia   • Gouty arthritis   • GERD without esophagitis   • Angio-edema   • Impaired fasting glucose         Past Medical History:   Diagnosis Date   • Angio-edema    • Arthritis    • Gout    • Hypercholesterolemia    • Hypertension          Past Surgical History:   Procedure Laterality Date   • Biopsy of breast, incisional      Left Breast Biopsy   • Colonoscopy  02/03/2012    hyperplastic polyp;repeat 5 yrs;02/2017   • Colonoscopy  07/24/2017    Hyperplastic/Dr. Cristi Mcdonough ([facility]   • Colonoscopy w biopsy  08/12/2008    TUBULAR ADENOMA-recall 2011   • Dexa bone density axial skeleton  01/24/2007   • Laparoscopy,tubal cautery      Tubal Ligation         Social History     Tobacco Use   • Smoking status: Former     Passive exposure: Past   • Smokeless tobacco: Never   Vaping Use   • Vaping Use: never used   Substance Use Topics   •  Alcohol use: Yes     Comment: not weekly   • Drug use: No     Drug use:    Drug Use:    No              Family History   Problem Relation Age of Onset   • Cancer Father         lung,brain,bone cancer   • Diabetes Mother         type 2       Current Outpatient Medications   Medication Sig Dispense Refill   • simvastatin (ZOCOR) 20 MG tablet Take 1 tablet by mouth nightly. FINAL REFILL.  PLEASE SCHEDULE A MEDICARE WELLNESS VISIT AND FASTING LAB APPOINTMENT.  ORDERS PLACED. 90 tablet 0   • metoPROLOL succinate (TOPROL-XL) 50 MG 24 hr tablet Take 1 tablet by mouth daily. FINAL REFILL.  PLEASE SCHEDULE A MEDICARE WELLNESS VISIT AND FASTING LAB APPOINTMENT.  ORDERS PLACED. 90 tablet 0   • amLODIPine (NORVASC) 10 MG tablet Take 1 tablet by mouth daily. FINAL REFILL.  PLEASE SCHEDULE A MEDICARE WELLNESS VISIT AND FASTING LAB APPOINTMENT.  ORDERS PLACED. 90 tablet 0   • omeprazole (PriLOSEC) 40 MG capsule Take 1 capsule by mouth daily. FINAL REFILL.  PLEASE SCHEDULE A MEDICARE WELLNESS VISIT AND FASTING LAB APPOINTMENT.  ORDERS PLACED. 90 capsule 0   • montelukast (SINGULAIR) 10 MG tablet TAKE ONE TABLET BY MOUTH ONCE NIGHTLY 90 tablet 3   • potassium CHLORIDE (KLOR-CON M) 20 MEQ terrell ER tablet Take 1 tablet by mouth daily for 3 days. 3 tablet 0   • celecoxib (CeleBREX) 50 MG capsule Bring prescription to Allergy appointment on 1/12/2023. Do not take prior to seeing Dr. Vázquez 5 capsule 0   • EPINEPHrine 0.3 MG/0.3ML auto-injector Inject 0.3 mLs into the muscle 1 time as needed for Anaphylaxis. 2 each 1   • Calcium Carbonate-Vitamin D (CALCIUM PLUS VITAMIN D PO)      • fexofenadine (ALLEGRA) 180 MG tablet Take 1 tablet by mouth daily.     • cetirizine (ZYRTEC) 10 MG tablet Take 10 mg by mouth daily.     • penciclovir (DENAVIR) 1 % cream Use daily as directed 5 g 1   • valACYclovir (VALTREX) 500 MG tablet Take 1 tablet by mouth 2 times daily. (Patient taking differently: Take 500 mg by mouth as needed.) 20 tablet 1     No  current facility-administered medications for this visit.        The following items on the Medicare Health Risk Assessment were found to be positive  6 b.) How many servings of High Fiber / Whole Grain Foods to you have each day ( 1 serving = 1 cup cold cereal, 1/2 cup cooked cereal, 1 slice bread): 1 per day     11f.) Feeling stressed or overwhelmed: Always         Vision and Hearing screens: No results found.    Advance care planning documents on file - no     Cognitive/Functional Status: no evidence of cognitive dysfunction by direct observation    Opioid Review: Angy is not taking opioid medications.    Recent PHQ 2/9 Score:    PHQ 2:  PHQ 2 Score Adult PHQ 2 Score Adult PHQ 2 Interpretation Little interest or pleasure in activity?   11/16/2023   1:32 PM 0 No further screening needed 0       PHQ 9:       DEPRESSION ASSESSMENT/PLAN:  Depression Screening performed. Screening time with patient was 15 minutes.     Body mass index is 26.14 kg/m².    BMI ASSESSMENT/PLAN:  Patient is overweight.    Journal food intake daily        Needed Screening/Treatment:   Cardiovascular screening - Lipids , Diabetes screening  and Colorectal cancer screening   Needed follow up:  None    See orders.   See Patient Instructions section.   Return in about 6 months (around 5/16/2024).   CHIEF COMPLAINT:  Medicare Wellness Visit (Subsequent visit) and Follow-up (Lab results)    HISTORY OF PRESENT ILLNESS:  Patient is a 68 year old female here for a physical with chief concerns of none.    MEDICATIONS:    Current Outpatient Medications   Medication Sig Dispense Refill   • simvastatin (ZOCOR) 20 MG tablet Take 1 tablet by mouth nightly. FINAL REFILL.  PLEASE SCHEDULE A MEDICARE WELLNESS VISIT AND FASTING LAB APPOINTMENT.  ORDERS PLACED. 90 tablet 0   • metoPROLOL succinate (TOPROL-XL) 50 MG 24 hr tablet Take 1 tablet by mouth daily. FINAL REFILL.  PLEASE SCHEDULE A MEDICARE WELLNESS VISIT AND FASTING LAB APPOINTMENT.  ORDERS PLACED.  90 tablet 0   • amLODIPine (NORVASC) 10 MG tablet Take 1 tablet by mouth daily. FINAL REFILL.  PLEASE SCHEDULE A MEDICARE WELLNESS VISIT AND FASTING LAB APPOINTMENT.  ORDERS PLACED. 90 tablet 0   • omeprazole (PriLOSEC) 40 MG capsule Take 1 capsule by mouth daily. FINAL REFILL.  PLEASE SCHEDULE A MEDICARE WELLNESS VISIT AND FASTING LAB APPOINTMENT.  ORDERS PLACED. 90 capsule 0   • montelukast (SINGULAIR) 10 MG tablet TAKE ONE TABLET BY MOUTH ONCE NIGHTLY 90 tablet 3   • potassium CHLORIDE (KLOR-CON M) 20 MEQ terrell ER tablet Take 1 tablet by mouth daily for 3 days. 3 tablet 0   • celecoxib (CeleBREX) 50 MG capsule Bring prescription to Allergy appointment on 1/12/2023. Do not take prior to seeing Dr. Vázquez 5 capsule 0   • EPINEPHrine 0.3 MG/0.3ML auto-injector Inject 0.3 mLs into the muscle 1 time as needed for Anaphylaxis. 2 each 1   • Calcium Carbonate-Vitamin D (CALCIUM PLUS VITAMIN D PO)      • fexofenadine (ALLEGRA) 180 MG tablet Take 1 tablet by mouth daily.     • cetirizine (ZYRTEC) 10 MG tablet Take 10 mg by mouth daily.     • penciclovir (DENAVIR) 1 % cream Use daily as directed 5 g 1   • valACYclovir (VALTREX) 500 MG tablet Take 1 tablet by mouth 2 times daily. (Patient taking differently: Take 500 mg by mouth as needed.) 20 tablet 1     No current facility-administered medications for this visit.       ALLERGIES:   Allergen Reactions   • Benazepril SWELLING     angioedema   • Nsaids SWELLING     Face - Mouth and eye swelling  Tolerates Celebrex/Wilson 2 inhibitors       Past Medical History:   Diagnosis Date   • Angio-edema    • Arthritis    • Gout    • Hypercholesterolemia    • Hypertension        Past Surgical History:   Procedure Laterality Date   • Biopsy of breast, incisional      Left Breast Biopsy   • Colonoscopy  02/03/2012    hyperplastic polyp;repeat 5 yrs;02/2017   • Colonoscopy  07/24/2017    Hyperplastic/Dr. Cristi Mcdonough ([facility])   • Colonoscopy w biopsy  08/12/2008    TUBULAR  ADENOMA-recall 2011   • Dexa bone density axial skeleton  01/24/2007   • Laparoscopy,tubal cautery      Tubal Ligation       Family History   Problem Relation Age of Onset   • Cancer Father         lung,brain,bone cancer   • Diabetes Mother         type 2       SOCIAL HISTORY:   Patient  reports that she has quit smoking. She has been exposed to tobacco smoke. She has never used smokeless tobacco. She reports current alcohol use. She reports that she does not use drugs.    REVIEW OF SYSTEMS:   GENERAL:  Patient denies fever, chills, tiredness, malaise, weight loss, weight gain.  EYES:  Patient denies blurred vision, double vision, pain, burning and itching.   NEUROLOGIC:  Patient denies tremors, dizzy spells, numbness, tingling, vision change, loss of balance.  ENDOCRINE:  Patient denies excessive thirst, heat intolerance, lymph node enlargement, tired/sluggishness.  GASTROINTESTINAL:  Patient denies abdominal pain, nausea/vomiting, indigestion/heartburn, diarrhea, constipation.  CARDIOVASCULAR:  Patient denies chest pain, shortness of breath, palpitations.  SKIN:  Patient denies skin rashes, boils, persistent itching, acne.  MUSCULOSKELETAL:  Patient denies joint pain, neck pain, back pain, leg swelling.  ENT/MOUTH:  Patient denies ear infections, sore throats, sinus problems, hearing loss.  GENITOURINARY:  Patient denies urine retention, painful urination, urinary frequency, blood in urine, nocturia.  RESPIRATORY:  Patient denies wheezing, frequent cough, shortness of breath.      PHYSICAL EXAM:  VITAL SIGNS:    Visit Vitals  /64   Pulse 61   Temp 98 °F (36.7 °C) (Oral)   Resp 16   Ht 5' 5\" (1.651 m)   Wt 71.3 kg (157 lb 1.6 oz)   BMI 26.14 kg/m²     GENERAL:  A 68 year old female in no acute distress.  CONSTITUTIONAL:  Not ill appearing.  SKIN:  Warm and moist with good color and turgor.  No malignant-appearing rashes or lesions are noted.  HEENT:  Head is symmetrical in shape.  No sores noted.  Eyes:   Pupils equal, round and reactive to light.  Extraocular motions are full.  Fundi benign.  Pinkish conjunctivae. Nonicteric sclerae.  Ears:  TMs clear, normal.  No erythema, no bulging, no retractions. No drainage or cerumen in the canals.  No tenderness noted to the outer ears.   Nasal passages are clear.  No rhinorrhea.  No swelling of the turbinates.  Septum midline.  Oropharynx:  Oral mucosa is moist and pink without pharyngeal erythema or exudate.  Teeth are in good repair.  Lips are not dry or cracked.   NECK:  Soft and supple.  No lymphadenopathy in the anterior or posterior cervical chain or submandibular area.  No thyroid enlargement or nodule.  No carotid bruits.   BACK:  No axial deformity, spinous or costovertebral angle tenderness.  LUNGS:  Clear to auscultation and resonant to percussion.   CARDIAC:  Regular rate and rhythm without murmurs, gallops or rub.   BREASTS:  Visually symmetrical.  No nipple discharge or skin dimpling.  No masses or axillary, supraclavicular or infraclavicular adenopathy.  No skin changes.  Breast examination taught.  ABDOMEN:  Bowel sounds 2+, soft and nontender.  Liver and spleen are not enlarged.  No masses, guarding, rebound or rigidity.    GENITALIA:  Deferred.    RECTAL:  Deferred.  MUSCULOSKELETAL:  Full range of motion of all of the major joints, upper and lower extremities.   NEUROLOGIC: Alert and oriented x3.  Muscle tone, bulk and strength are good.  Deep tendon reflexes are symmetrical in upper and lower extremities.     ASSESSMENT:  Physical.    PLAN:  1.   Labs ordered per chart.  2.  Discussed diet and exercise.  3.  Follow up in 1 year, sooner as needed.                 - A1c 13.9%, goal < 7%  - initial labs more suggestive of HHS rather than DKA  - c/w Lantus 15 units qhs, add Admelog 5 units before meals, and change correction scale to low correction scale qac and qhs  - consistent carb diet  - check FS qac and qhs  - RD consult  - will follow  - start insulin pen teaching with patient   - for discharge: either dc on basal bolus insulin or basal insulin + oral agents (such as Metformin [ EF noted to 35-40% with normal renal function] with SGLT2 inhibitor such as Jardiance 10 mg daily if insulin requirements are relatively low. Final recs TBD. She can follow up in the office if she wishes - [phone] - [address], [address], [place]

## 2024-01-26 NOTE — H&P ADULT - NSRESEARCHGRANTASSESS_GEN_A_CORE
----- Message from Nikki Barrientos sent at 1/26/2024 10:48 AM CST -----  Regarding: return call  Type:  Patient Returning Call    Who Called:pt  Who Left Message for Patient:  Does the patient know what this is regarding?:  Would the patient rather a call back or a response via [facility]? C/b  Best Call Back Number:[phone]  Additional Information:         Not applicable: This is a surgical and/or non-medical patient